# Patient Record
Sex: MALE | Race: WHITE | NOT HISPANIC OR LATINO | Employment: OTHER | URBAN - METROPOLITAN AREA
[De-identification: names, ages, dates, MRNs, and addresses within clinical notes are randomized per-mention and may not be internally consistent; named-entity substitution may affect disease eponyms.]

---

## 2020-01-15 ENCOUNTER — OFFICE VISIT (OUTPATIENT)
Dept: URGENT CARE | Facility: URGENT CARE | Age: 50
End: 2020-01-15
Payer: COMMERCIAL

## 2020-01-15 ENCOUNTER — ANCILLARY PROCEDURE (OUTPATIENT)
Dept: GENERAL RADIOLOGY | Facility: CLINIC | Age: 50
End: 2020-01-15
Attending: FAMILY MEDICINE
Payer: COMMERCIAL

## 2020-01-15 VITALS
OXYGEN SATURATION: 98 % | DIASTOLIC BLOOD PRESSURE: 73 MMHG | SYSTOLIC BLOOD PRESSURE: 120 MMHG | TEMPERATURE: 97.8 F | HEART RATE: 62 BPM | RESPIRATION RATE: 20 BRPM

## 2020-01-15 DIAGNOSIS — J20.9 ACUTE BRONCHITIS, UNSPECIFIED ORGANISM: Primary | ICD-10-CM

## 2020-01-15 DIAGNOSIS — R05.9 COUGH: ICD-10-CM

## 2020-01-15 PROCEDURE — 99203 OFFICE O/P NEW LOW 30 MIN: CPT | Performed by: FAMILY MEDICINE

## 2020-01-15 PROCEDURE — 71046 X-RAY EXAM CHEST 2 VIEWS: CPT

## 2020-01-15 RX ORDER — ALBUTEROL SULFATE 90 UG/1
2 AEROSOL, METERED RESPIRATORY (INHALATION) EVERY 6 HOURS
Qty: 1 INHALER | Refills: 0 | Status: SHIPPED | OUTPATIENT
Start: 2020-01-15 | End: 2021-04-16

## 2020-01-15 NOTE — PROGRESS NOTES
SUBJECTIVE:   Manfred Young is a 49 year old male presenting with a chief complaint of possible bronchitis.  Had fever, chest heavy.  Fever finally subsided this morning.  Mild phlegm.  Denies any sinus congestion.  Feeling more SOB  Onset of symptoms was 5 day(s) ago.  Course of illness is slight improvement.    Severity moderate  Current and Associated symptoms: cough, SOB, fever  Treatment measures tried include Fluids, Rest and Advil PM.  Predisposing factors include None.    Did not obtain flu vaccination  No TOB use.  No history of asthma, tried inhaler - may be 2 years old and did not feel any difference    Past Medical History:   Diagnosis Date     NO ACTIVE PROBLEMS      Current Outpatient Medications   Medication Sig Dispense Refill     ibuprofen (IBUPROFEN) 200 MG tablet Take 200 mg by mouth every 4 hours as needed for mild pain       ALEVE 220 MG OR CAPS 1 CAPSULE EVERY 12 HOURS AS NEEDED       guaiFENesin-codeine (ROBITUSSIN AC) 100-10 MG/5ML SOLN Take 5-10 mLs by mouth every 6 hours as needed for cough (Patient not taking: Reported on 1/15/2020) 240 mL 0     Social History     Tobacco Use     Smoking status: Never Smoker   Substance Use Topics     Alcohol use: Yes       ROS:  Review of systems negative except as stated above.    OBJECTIVE:  /73   Pulse 62   Temp 97.8  F (36.6  C) (Tympanic)   Resp 20   SpO2 98%   GENERAL APPEARANCE: healthy, alert and no distress  EYES: EOMI,  PERRL, conjunctiva clear  HENT: ear canals and TM's normal.  Nose and mouth without ulcers, erythema or lesions  NECK: supple, nontender, no lymphadenopathy  RESP: lungs clear to auscultation - no rales, rhonchi or wheezes  CV: regular rates and rhythm, normal S1 S2, no murmur noted  PSYCH: mentation appears normal and affect normal/bright    CXR - no acute infiltrate, no pleural effusion, no pneumothorax personally viewed by me    ASSESSMENT/PLAN:  (J20.9) Acute bronchitis, unspecified organism  (primary encounter  diagnosis)  Plan: albuterol (PROAIR HFA/PROVENTIL HFA/VENTOLIN         HFA) 108 (90 Base) MCG/ACT inhaler            (R05) Cough  Comment: viral  Plan: XR Chest 2 Views, albuterol (PROAIR         HFA/PROVENTIL HFA/VENTOLIN HFA) 108 (90 Base)         MCG/ACT inhaler            Reassurance given, reviewed symptomatic treatment with tylenol, ibuprofen, plenty of fluids and rest.  Discussed possible influenza but is too many days out for effective Tamiflu treatment, reviewed bronchitis symptoms most likely viral etiology.  RX albuterol inhaler given to use, reviewed that  inhaler may not be as effective.    Follow up with primary provider if no resolution of symptoms in 1 week    Kuldeep Hansen MD, MD  January 15, 2020 9:51 AM

## 2020-12-31 ENCOUNTER — TELEPHONE (OUTPATIENT)
Dept: TRANSPLANT | Facility: CLINIC | Age: 50
End: 2020-12-31

## 2020-12-31 NOTE — TELEPHONE ENCOUNTER
"Close Window   Print This Page   Expand All  Collapse All  [facility] B[facility]  [id number]      LIVING KIDNEY DONOR EVALUATION  Donor First Name Manfred Donor MRN    Donor Last Name Hector Completed 2020 12:15 AM    1970 Record ID [id number]   BREEZE Screen PASSED     Intended Recipient  Recipient First Name ALTRUISTIC Recipient MRN    Recipient Last Name ALTRUISTIC Relationship N/A   Recipient   Recipient Diagnosis    Recipient's ABO      Donor Information  Age 50 Gender Male   Ht 185 cm (6' 1'') Race    Wt 88.4 kg (195 lbs) Ethnicity Not /   BMI 25.70 kg/m  Preferred Language English      Required No     Blood Type A   Demographics  Home Address [address] [place] [address] # [phone]   [facility] Type [place]   State [facility] #    [facility] Code [id number] Type    Country [place] Preferred Contact day Wed   Email [email or web address] Preferred Contact time 11:00 AM-1:00 PM   &&   Donor's Medical Information  Medical History Kidney Stones Medications None Reported   Surgical History Arthroscopy, Left Knee   Arthroscopy, Right Knee   Repair, Detached Retina Allergies Chlorhexidine : Rash   Penicillin : Rash   Social History EtOH: Rare (1-2 drinks/month)   Illicit Drug Use: Denies   Tobacco: Denies Self-Reported Functional Status \"I am able to participate in strenuous sports such as swimming, singles tennis, football, basketball, or skiing\"   Family Medical History Cancer (denies)   Diabetes (denies)   Heart Disease (Grandparent)   Hypertension (denies)   Kidney Disease (denies)   Kidney Stones (Grandparent, Aunt or Uncle, Mother, Cousin) Exercise Frequency Exercise (3 X per week)   Review of Organ Systems  Review of Systems Airway or Lungs: No   Blood Disorder: No   Cancer: No   Diabetes,Thyroid,Adrenal,Endocrine Disorder: No   Digestive or Liver: No   Heart or Circulatory System: No   Immune Diseases: No   Kidneys and Bladder: Yes   Male Health: No "   Muscles,Bones,Joints: No   Neuro: No   Psych: No   &&   Donor's Social Information  Marital Status  Living Accommodation Owns own home/apartment   Level of Education Some college education Living Arrangement With relatives other than spouse, parents   Employment Status Self Employed Concerns: health and life insurance No   Employer  Concerns: job security and lost income Yes   Occupation      Medical Insurance Status Has medical insurance     High Risk Behavior  High Risk Behaviors Blood transfusion < 12 months. (NO)   Commercial sex < 12 months. (NO)   Illicit IV drug use < 5yrs. (NO)   Male:male sexual contact < 5yrs. (NO)   Other high risk sexual contact < 12 months. (NO)   Reason for Donation  Referral From Other Donor Reason for Donation My mentor has kidney disease and has had two transplants. He's no longer eligible and is on dialysis. I can't help him, but I'd like to help someone. I also heard a recent donor's story on NPR's It's Been a Minute last weekend. It was inspiring.   Permission to Disclose Inquiry Yes Patient Comments    Donor Motivation Level Unsure, has questions     PCP Contact  PCP Name    PCP [facility]    PCP [facility]    PCP Phone    Emergency Contact  First Name Valerie First Name tom   Last Name Wan Last Name Hector   Phone # [phone] Phone # [phone]   Phone Type Mobile Phone Type Mobile   Relationship Spouse Relationship Daughter   Office Use  Reviewed By    Reviewed 12/31/2020 12:25 PM   Admin Folder Archive   Comments    Lost for Followup    Extended Comments    [facility] ID [email or web address]:[id number] survey status completed   Activity History  Call  Due Date 12/30/2020   Last Modified Date/Time 12/30/2020 1:50 PM   Comments    Call  Due Date 12/30/2020   Last Modified Date/Time 12/30/2020 11:44 AM   Comments

## 2020-12-31 NOTE — TELEPHONE ENCOUNTER
"[facility]  [id number]      LIVING KIDNEY DONOR EVALUATION  Donor First Name Manfred Donor MRN    Donor Last Name Hector Completed 2020 12:15 AM    1970 Record ID [id number]   BREEZE Screen PASSED     Intended Recipient  Recipient First Name ALTRUISTIC Recipient MRN    Recipient Last Name ALTRUISTIC Relationship N/A   Recipient   Recipient Diagnosis    Recipient's ABO      Donor Information  Age 50 Gender Male   Ht 185 cm (6' 1'') Race    Wt 88.4 kg (195 lbs) Ethnicity Not /   BMI 25.70 kg/m  Preferred Language English      Required No     Blood Type A   Demographics  Home Address [address] [place] [address] # [phone]   [facility] Type [facility] #    [facility] Code [id number] Type    Country [place] Preferred Contact day Wed   Email [email or web address] Preferred Contact time 11:00 AM-1:00 PM   &&   Donor's Medical Information  Medical History Kidney Stones Medications None Reported   Surgical History Arthroscopy, Left Knee   Arthroscopy, Right Knee   Repair, Detached Retina Allergies Chlorhexidine : Rash   Penicillin : Rash   Social History EtOH: Rare (1-2 drinks/month)   Illicit Drug Use: Denies   Tobacco: Denies Self-Reported Functional Status \"I am able to participate in strenuous sports such as swimming, singles tennis, football, basketball, or skiing\"   Family Medical History Cancer (denies)   Diabetes (denies)   Heart Disease (Grandparent)   Hypertension (denies)   Kidney Disease (denies)   Kidney Stones (Grandparent, Aunt or Uncle, Mother, Cousin) Exercise Frequency Exercise (3 X per week)   Review of Organ Systems  Review of Systems Airway or Lungs: No   Blood Disorder: No   Cancer: No   Diabetes,Thyroid,Adrenal,Endocrine Disorder: No   Digestive or Liver: No   Heart or Circulatory System: No   Immune Diseases: No   Kidneys and Bladder: Yes   Male Health: No   Muscles,Bones,Joints: No   Neuro: No   Psych: No   &&   Donor's " Social Information  Marital Status  Living Accommodation Owns own home/apartment   Level of Education Some college education Living Arrangement With relatives other than spouse, parents   Employment Status Self Employed Concerns: health and life insurance No   Employer  Concerns: job security and lost income Yes   Occupation      Medical Insurance Status Has medical insurance     High Risk Behavior  High Risk Behaviors Blood transfusion < 12 months. (NO)   Commercial sex < 12 months. (NO)   Illicit IV drug use < 5yrs. (NO)   Male:male sexual contact < 5yrs. (NO)   Other high risk sexual contact < 12 months. (NO)   Reason for Donation  Referral From Other Donor Reason for Donation My mentor has kidney disease and has had two transplants. He's no longer eligible and is on dialysis. I can't help him, but I'd like to help someone. I also heard a recent donor's story on NPR's It's Been a Minute last weekend. It was inspiring.   Permission to Disclose Inquiry Yes Patient Comments    Donor Motivation Level Unsure, has questions     PCP Contact  PCP Name    PCP [facility]    PCP State    PCP Phone    Emergency Contact  First Name Valerie First Name tom   Last Name Wan Last Name Hector   Phone # [phone] Phone # [phone]   Phone Type Mobile Phone Type Mobile   Relationship Spouse Relationship Daughter   Office Use  Reviewed By    Reviewed 12/31/2020 12:25 PM   Admin Folder Archive   Comments    Lost for Followup    Extended Comments    [facility] ID [id number] survey status completed   Activity History  Call  Due Date 12/30/2020   Last Modified Date/Time 12/30/2020 1:50 PM   Comments    Call  Due Date 12/30/2020   Last Modified Date/Time 12/30/2020 11:44 AM   Comments

## 2021-01-04 DIAGNOSIS — Z00.5 TRANSPLANT DONOR EVALUATION: Primary | ICD-10-CM

## 2021-01-04 NOTE — TELEPHONE ENCOUNTER
NDD. Is abo A. Had kidney stone x1 approx 10 yrs ago.Passed stone.Will send info/forms.To [facility] for labs.

## 2021-02-02 ENCOUNTER — ALLIED HEALTH/NURSE VISIT (OUTPATIENT)
Dept: TRANSPLANT | Facility: CLINIC | Age: 51
End: 2021-02-02
Attending: INTERNAL MEDICINE

## 2021-02-02 VITALS
HEART RATE: 61 BPM | TEMPERATURE: 98.2 F | OXYGEN SATURATION: 97 % | DIASTOLIC BLOOD PRESSURE: 83 MMHG | SYSTOLIC BLOOD PRESSURE: 116 MMHG | BODY MASS INDEX: 26.97 KG/M2 | WEIGHT: 204.4 LBS

## 2021-02-02 DIAGNOSIS — Z00.5 TRANSPLANT DONOR EVALUATION: Primary | ICD-10-CM

## 2021-02-02 DIAGNOSIS — Z00.5 TRANSPLANT DONOR EVALUATION: ICD-10-CM

## 2021-02-02 LAB
ABO + RH BLD: NORMAL
ABO + RH BLD: NORMAL
ALBUMIN UR-MCNC: NEGATIVE MG/DL
APPEARANCE UR: CLEAR
BILIRUB UR QL STRIP: NEGATIVE
COLOR UR AUTO: YELLOW
CREAT SERPL-MCNC: 0.97 MG/DL (ref 0.66–1.25)
CREAT UR-MCNC: 194 MG/DL
GFR SERPL CREATININE-BSD FRML MDRD: >90 ML/MIN/{1.73_M2}
GLUCOSE SERPL-MCNC: 96 MG/DL (ref 70–99)
GLUCOSE UR STRIP-MCNC: NEGATIVE MG/DL
HGB BLD-MCNC: 15.6 G/DL (ref 13.3–17.7)
HGB UR QL STRIP: NEGATIVE
KETONES UR STRIP-MCNC: NEGATIVE MG/DL
LEUKOCYTE ESTERASE UR QL STRIP: NEGATIVE
MICROALBUMIN UR-MCNC: 15 MG/L
MICROALBUMIN/CREAT UR: 7.94 MG/G CR (ref 0–17)
MUCOUS THREADS #/AREA URNS LPF: PRESENT /LPF
NITRATE UR QL: NEGATIVE
PH UR STRIP: 5 PH (ref 5–7)
PROT UR-MCNC: 0.26 G/L
PROT/CREAT 24H UR: 0.13 G/G CR (ref 0–0.2)
RBC #/AREA URNS AUTO: 1 /HPF (ref 0–2)
SOURCE: ABNORMAL
SP GR UR STRIP: 1.02 (ref 1–1.03)
SPECIMEN EXP DATE BLD: NORMAL
UROBILINOGEN UR STRIP-MCNC: 0 MG/DL (ref 0–2)
WBC #/AREA URNS AUTO: 1 /HPF (ref 0–5)

## 2021-02-02 PROCEDURE — 82043 UR ALBUMIN QUANTITATIVE: CPT | Performed by: PATHOLOGY

## 2021-02-02 PROCEDURE — 81001 URINALYSIS AUTO W/SCOPE: CPT | Performed by: PATHOLOGY

## 2021-02-02 PROCEDURE — 99207 PR NO CHARGE NURSE ONLY: CPT

## 2021-02-02 PROCEDURE — 86900 BLOOD TYPING SEROLOGIC ABO: CPT | Performed by: PATHOLOGY

## 2021-02-02 PROCEDURE — 82947 ASSAY GLUCOSE BLOOD QUANT: CPT | Performed by: PATHOLOGY

## 2021-02-02 PROCEDURE — 36415 COLL VENOUS BLD VENIPUNCTURE: CPT | Performed by: PATHOLOGY

## 2021-02-02 PROCEDURE — 84156 ASSAY OF PROTEIN URINE: CPT | Performed by: PATHOLOGY

## 2021-02-02 PROCEDURE — 82565 ASSAY OF CREATININE: CPT | Performed by: PATHOLOGY

## 2021-02-02 PROCEDURE — 85018 HEMOGLOBIN: CPT | Performed by: PATHOLOGY

## 2021-02-02 ASSESSMENT — PAIN SCALES - GENERAL: PAINLEVEL: NO PAIN (0)

## 2021-02-02 NOTE — PROGRESS NOTES
"Chief Complaint   Patient presents with     Allied Health Visit     living donor 3 bp     Vital signs:  Temp: 98.2  F (36.8  C)   BP: 117/76 Pulse: 61     SpO2: 97 %       Weight: 92.7 kg (204 lb 6.4 oz)  Estimated body mass index is 26.97 kg/m  as calculated from the following:    Height as of 3/1/16: 1.854 m (6' 1\").    Weight as of this encounter: 92.7 kg (204 lb 6.4 oz).        Amna Newby, CMA    "

## 2021-03-01 ENCOUNTER — TELEPHONE (OUTPATIENT)
Dept: TRANSPLANT | Facility: CLINIC | Age: 51
End: 2021-03-01

## 2021-03-01 NOTE — LETTER
REIMBURSEMENT INFORMATION FOR LIVING ORGAN DONORS    LIVING ORGAN DONOR: This form MUST accompany & remain attached to Orders &  given to Provider and/or Healthcare Facility Business Office    PROVIDER/FACILITY INSTRUCTIONS: By accepting to perform these services for living organ  donation, the provider/facility agrees to exclusively bill the [facility] instead of billing  the patient or any insurance provider and agrees to accept the reimbursement, as described below, as  payment in full for services rendered.    PROVIDER BILLING INSTRUCTIONS:  1. [facility] agrees to pay for all authorized testing ordered by our transplant  program that is related to living organ donation. The attached orders/tests are part of the donor  Evaluation.    2. Do not bill the donor or donor's insurance. Send an itemized invoice, claim or statement to:    [facility]  Transplant Finance/Donor Billing  [address] [place]  [place] [address]    3. Billing statements must include the patient first and last name, date of birth, the CPT procedure code  and date of service. Please bill service on the ORIGINAL UBO4 or 1500 with appropriate CPT/HCPCS  codes along with W-9 and send to the above address to insure timely reimbursement.    4. Claims should be submitted no later than six months from the date when services are rendered.  Claims denied for late submission should not be billed to the donor or their private insurance carrier.    5. [facility] will reimburse all charges at 100% of the Medicare Fee Schedule as  defined in the Code of Federal Regulations (CFR) 42, Chapter IV. This is to be considered payment  in full. [facility], the patient, and/or the patient's insurance are NOT to  be billed any balance, co-payment, or deductible, per Medicare regulations. **ATTN: Facility  providing services for attached/enclosed Living Donor Orders; If facility does  NOT AGREE to  the reimbursement rate stated above, PLEASE DENY SERVICES & refer Donor/patient back to  their [facility].    6. Patients are NOT to make any payments at the time of service.    Please forward this information to your billing department so that a donor account can be set up with  these instructions.    Should you have any questions, please contact the Donor Billing office at [phone],  Monday - Friday, 8:00 a.m. to 4:00 p.m.   Thank you for your assistance.

## 2021-03-09 ENCOUNTER — TRANSFERRED RECORDS (OUTPATIENT)
Dept: HEALTH INFORMATION MANAGEMENT | Facility: CLINIC | Age: 51
End: 2021-03-09

## 2021-03-24 ENCOUNTER — TELEPHONE (OUTPATIENT)
Dept: TRANSPLANT | Facility: CLINIC | Age: 51
End: 2021-03-24

## 2021-03-24 DIAGNOSIS — Z00.5 TRANSPLANT DONOR EVALUATION: ICD-10-CM

## 2021-03-24 NOTE — TELEPHONE ENCOUNTER
Reviewed [employer] test results at Donor Meeting.Per Dr Quezada he needs to increase fluids to at least 3L's/day and be able to urinate 2L's/day.Stated we can cont process of eval.Now reviewed everything with Manfred.States he's a Vegan.Would like to sched eval.

## 2021-03-24 NOTE — TELEPHONE ENCOUNTER
Please sched kidney donor eval for 4/16/21 slot 3.[facility] is coordinator.Will do Iohexol on 4/16.Schedule COVID test/eval labs for 4/12.Has MyChart. I will put in orders.

## 2021-03-27 ENCOUNTER — HEALTH MAINTENANCE LETTER (OUTPATIENT)
Age: 51
End: 2021-03-27

## 2021-04-12 DIAGNOSIS — Z00.5 TRANSPLANT DONOR EVALUATION: ICD-10-CM

## 2021-04-12 LAB
ABO + RH BLD: NORMAL
ALBUMIN SERPL-MCNC: 3.6 G/DL (ref 3.4–5)
ALBUMIN UR-MCNC: NEGATIVE MG/DL
ALP SERPL-CCNC: 65 U/L (ref 40–150)
ALT SERPL W P-5'-P-CCNC: 85 U/L (ref 0–70)
ANION GAP SERPL CALCULATED.3IONS-SCNC: 3 MMOL/L (ref 3–14)
APPEARANCE UR: CLEAR
APTT PPP: 27 SEC (ref 22–37)
AST SERPL W P-5'-P-CCNC: 30 U/L (ref 0–45)
BILIRUB DIRECT SERPL-MCNC: 0.2 MG/DL (ref 0–0.2)
BILIRUB SERPL-MCNC: 0.8 MG/DL (ref 0.2–1.3)
BILIRUB UR QL STRIP: NEGATIVE
BLD GP AB SCN SERPL QL: NORMAL
BLOOD BANK CMNT PATIENT-IMP: NORMAL
BUN SERPL-MCNC: 12 MG/DL (ref 7–30)
CALCIUM SERPL-MCNC: 8.6 MG/DL (ref 8.5–10.1)
CHLORIDE SERPL-SCNC: 114 MMOL/L (ref 94–109)
CHOLEST SERPL-MCNC: 182 MG/DL
CMV IGG SERPL QL IA: <0.2 AI (ref 0–0.8)
CO2 SERPL-SCNC: 28 MMOL/L (ref 20–32)
COLOR UR AUTO: YELLOW
CREAT SERPL-MCNC: 0.96 MG/DL (ref 0.66–1.25)
CREAT UR-MCNC: 132 MG/DL
EBV VCA IGG SER QL IA: 6.4 AI (ref 0–0.8)
EBV VCA IGM SER QL IA: 0.2 AI (ref 0–0.8)
ERYTHROCYTE [DISTWIDTH] IN BLOOD BY AUTOMATED COUNT: 13.8 % (ref 10–15)
GFR SERPL CREATININE-BSD FRML MDRD: >90 ML/MIN/{1.73_M2}
GLUCOSE SERPL-MCNC: 98 MG/DL (ref 70–99)
GLUCOSE UR STRIP-MCNC: NEGATIVE MG/DL
HBA1C MFR BLD: 5.2 % (ref 0–5.6)
HBV CORE AB SERPL QL IA: NONREACTIVE
HBV SURFACE AB SERPL IA-ACNC: 1.31 M[IU]/ML
HBV SURFACE AG SERPL QL IA: NONREACTIVE
HCT VFR BLD AUTO: 47.2 % (ref 40–53)
HCV AB SERPL QL IA: NONREACTIVE
HDLC SERPL-MCNC: 45 MG/DL
HGB BLD-MCNC: 15.2 G/DL (ref 13.3–17.7)
HGB UR QL STRIP: NEGATIVE
HIV 1+2 AB+HIV1 P24 AG SERPL QL IA: NONREACTIVE
INR PPP: 1.04 (ref 0.86–1.14)
KETONES UR STRIP-MCNC: NEGATIVE MG/DL
LABORATORY COMMENT REPORT: NORMAL
LDLC SERPL CALC-MCNC: 105 MG/DL
LEUKOCYTE ESTERASE UR QL STRIP: NEGATIVE
MCH RBC QN AUTO: 28 PG (ref 26.5–33)
MCHC RBC AUTO-ENTMCNC: 32.2 G/DL (ref 31.5–36.5)
MCV RBC AUTO: 87 FL (ref 78–100)
MICROALBUMIN UR-MCNC: 9 MG/L
MICROALBUMIN/CREAT UR: 6.73 MG/G CR (ref 0–17)
MUCOUS THREADS #/AREA URNS LPF: PRESENT /LPF
NITRATE UR QL: NEGATIVE
NONHDLC SERPL-MCNC: 137 MG/DL
PH UR STRIP: 6 PH (ref 5–7)
PHOSPHATE SERPL-MCNC: 3 MG/DL (ref 2.5–4.5)
PLATELET # BLD AUTO: 202 10E9/L (ref 150–450)
POTASSIUM SERPL-SCNC: 4.3 MMOL/L (ref 3.4–5.3)
PROT SERPL-MCNC: 6.6 G/DL (ref 6.8–8.8)
PROT UR-MCNC: 0.15 G/L
PROT/CREAT 24H UR: 0.11 G/G CR (ref 0–0.2)
RBC # BLD AUTO: 5.42 10E12/L (ref 4.4–5.9)
RBC #/AREA URNS AUTO: <1 /HPF (ref 0–2)
SARS-COV-2 RNA RESP QL NAA+PROBE: NEGATIVE
SARS-COV-2 RNA RESP QL NAA+PROBE: NORMAL
SODIUM SERPL-SCNC: 145 MMOL/L (ref 133–144)
SOURCE: ABNORMAL
SP GR UR STRIP: 1.02 (ref 1–1.03)
SPECIMEN EXP DATE BLD: NORMAL
SPECIMEN EXP DATE BLD: NORMAL
SPECIMEN SOURCE: NORMAL
SPECIMEN SOURCE: NORMAL
T PALLIDUM AB SER QL: NONREACTIVE
TRIGL SERPL-MCNC: 162 MG/DL
URATE SERPL-MCNC: 5.5 MG/DL (ref 3.5–7.2)
UROBILINOGEN UR STRIP-MCNC: 0 MG/DL (ref 0–2)
WBC # BLD AUTO: 4.5 10E9/L (ref 4–11)
WBC #/AREA URNS AUTO: 1 /HPF (ref 0–5)

## 2021-04-12 PROCEDURE — U0005 INFEC AGEN DETEC AMPLI PROBE: HCPCS | Mod: 90 | Performed by: PATHOLOGY

## 2021-04-12 PROCEDURE — 86905 BLOOD TYPING RBC ANTIGENS: CPT | Mod: 90 | Performed by: PATHOLOGY

## 2021-04-12 PROCEDURE — 84550 ASSAY OF BLOOD/URIC ACID: CPT | Performed by: PATHOLOGY

## 2021-04-12 PROCEDURE — 85027 COMPLETE CBC AUTOMATED: CPT | Performed by: PATHOLOGY

## 2021-04-12 PROCEDURE — 86644 CMV ANTIBODY: CPT | Mod: 90 | Performed by: PATHOLOGY

## 2021-04-12 PROCEDURE — 82248 BILIRUBIN DIRECT: CPT | Performed by: PATHOLOGY

## 2021-04-12 PROCEDURE — 83036 HEMOGLOBIN GLYCOSYLATED A1C: CPT | Performed by: PATHOLOGY

## 2021-04-12 PROCEDURE — 36415 COLL VENOUS BLD VENIPUNCTURE: CPT | Performed by: PATHOLOGY

## 2021-04-12 PROCEDURE — 86780 TREPONEMA PALLIDUM: CPT | Mod: 90 | Performed by: PATHOLOGY

## 2021-04-12 PROCEDURE — 86803 HEPATITIS C AB TEST: CPT | Mod: 90 | Performed by: PATHOLOGY

## 2021-04-12 PROCEDURE — 86704 HEP B CORE ANTIBODY TOTAL: CPT | Mod: 90 | Performed by: PATHOLOGY

## 2021-04-12 PROCEDURE — 86901 BLOOD TYPING SEROLOGIC RH(D): CPT | Performed by: PATHOLOGY

## 2021-04-12 PROCEDURE — 81001 URINALYSIS AUTO W/SCOPE: CPT | Performed by: PATHOLOGY

## 2021-04-12 PROCEDURE — 86790 VIRUS ANTIBODY NOS: CPT | Mod: 90 | Performed by: PATHOLOGY

## 2021-04-12 PROCEDURE — 80061 LIPID PANEL: CPT | Performed by: PATHOLOGY

## 2021-04-12 PROCEDURE — 84156 ASSAY OF PROTEIN URINE: CPT | Performed by: PATHOLOGY

## 2021-04-12 PROCEDURE — 86481 TB AG RESPONSE T-CELL SUSP: CPT | Mod: 90 | Performed by: PATHOLOGY

## 2021-04-12 PROCEDURE — 87340 HEPATITIS B SURFACE AG IA: CPT | Mod: 90 | Performed by: PATHOLOGY

## 2021-04-12 PROCEDURE — U0003 INFECTIOUS AGENT DETECTION BY NUCLEIC ACID (DNA OR RNA); SEVERE ACUTE RESPIRATORY SYNDROME CORONAVIRUS 2 (SARS-COV-2) (CORONAVIRUS DISEASE [COVID-19]), AMPLIFIED PROBE TECHNIQUE, MAKING USE OF HIGH THROUGHPUT TECHNOLOGIES AS DESCRIBED BY CMS-2020-01-R: HCPCS | Mod: 90 | Performed by: PATHOLOGY

## 2021-04-12 PROCEDURE — 86706 HEP B SURFACE ANTIBODY: CPT | Mod: 90 | Performed by: PATHOLOGY

## 2021-04-12 PROCEDURE — 86850 RBC ANTIBODY SCREEN: CPT | Performed by: PATHOLOGY

## 2021-04-12 PROCEDURE — 86900 BLOOD TYPING SEROLOGIC ABO: CPT | Performed by: PATHOLOGY

## 2021-04-12 PROCEDURE — 85730 THROMBOPLASTIN TIME PARTIAL: CPT | Performed by: PATHOLOGY

## 2021-04-12 PROCEDURE — 82043 UR ALBUMIN QUANTITATIVE: CPT | Performed by: PATHOLOGY

## 2021-04-12 PROCEDURE — 84100 ASSAY OF PHOSPHORUS: CPT | Performed by: PATHOLOGY

## 2021-04-12 PROCEDURE — 85610 PROTHROMBIN TIME: CPT | Performed by: PATHOLOGY

## 2021-04-12 PROCEDURE — 86665 EPSTEIN-BARR CAPSID VCA: CPT | Mod: 90 | Performed by: PATHOLOGY

## 2021-04-12 PROCEDURE — 80053 COMPREHEN METABOLIC PANEL: CPT | Performed by: PATHOLOGY

## 2021-04-13 LAB
GAMMA INTERFERON BACKGROUND BLD IA-ACNC: 0 IU/ML
M TB IFN-G CD4+ BCKGRND COR BLD-ACNC: 10 IU/ML
M TB TUBERC IFN-G BLD QL: NEGATIVE
MITOGEN IGNF BCKGRD COR BLD-ACNC: 0 IU/ML
MITOGEN IGNF BCKGRD COR BLD-ACNC: 0 IU/ML

## 2021-04-14 ENCOUNTER — TELEPHONE (OUTPATIENT)
Dept: TRANSPLANT | Facility: CLINIC | Age: 51
End: 2021-04-14

## 2021-04-14 ENCOUNTER — DOCUMENTATION ONLY (OUTPATIENT)
Dept: TRANSPLANT | Facility: CLINIC | Age: 51
End: 2021-04-14

## 2021-04-14 LAB — HTLV I+II AB PATRN SER IB-IMP: NEGATIVE

## 2021-04-16 ENCOUNTER — OFFICE VISIT (OUTPATIENT)
Dept: TRANSPLANT | Facility: CLINIC | Age: 51
End: 2021-04-16
Attending: TRANSPLANT SURGERY

## 2021-04-16 ENCOUNTER — ANCILLARY PROCEDURE (OUTPATIENT)
Dept: CT IMAGING | Facility: CLINIC | Age: 51
End: 2021-04-16
Attending: INTERNAL MEDICINE

## 2021-04-16 ENCOUNTER — APPOINTMENT (OUTPATIENT)
Dept: TRANSPLANT | Facility: CLINIC | Age: 51
End: 2021-04-16
Attending: INTERNAL MEDICINE

## 2021-04-16 ENCOUNTER — OFFICE VISIT (OUTPATIENT)
Dept: NEPHROLOGY | Facility: CLINIC | Age: 51
End: 2021-04-16
Attending: TRANSPLANT SURGERY

## 2021-04-16 ENCOUNTER — INFUSION THERAPY VISIT (OUTPATIENT)
Dept: INFUSION THERAPY | Facility: CLINIC | Age: 51
End: 2021-04-16
Attending: INTERNAL MEDICINE

## 2021-04-16 ENCOUNTER — ALLIED HEALTH/NURSE VISIT (OUTPATIENT)
Dept: TRANSPLANT | Facility: CLINIC | Age: 51
End: 2021-04-16
Attending: TRANSPLANT SURGERY

## 2021-04-16 ENCOUNTER — ALLIED HEALTH/NURSE VISIT (OUTPATIENT)
Dept: TRANSPLANT | Facility: CLINIC | Age: 51
End: 2021-04-16
Attending: TRANSPLANT SURGERY
Payer: COMMERCIAL

## 2021-04-16 ENCOUNTER — ANCILLARY PROCEDURE (OUTPATIENT)
Dept: GENERAL RADIOLOGY | Facility: CLINIC | Age: 51
End: 2021-04-16
Attending: INTERNAL MEDICINE

## 2021-04-16 VITALS
TEMPERATURE: 98.4 F | WEIGHT: 206.4 LBS | OXYGEN SATURATION: 98 % | DIASTOLIC BLOOD PRESSURE: 77 MMHG | HEIGHT: 73 IN | RESPIRATION RATE: 16 BRPM | BODY MASS INDEX: 27.35 KG/M2 | HEART RATE: 55 BPM | SYSTOLIC BLOOD PRESSURE: 120 MMHG

## 2021-04-16 DIAGNOSIS — Z00.5 TRANSPLANT DONOR EVALUATION: ICD-10-CM

## 2021-04-16 DIAGNOSIS — Z00.5 TRANSPLANT DONOR EVALUATION: Primary | ICD-10-CM

## 2021-04-16 DIAGNOSIS — Z87.442 HISTORY OF NEPHROLITHIASIS: ICD-10-CM

## 2021-04-16 DIAGNOSIS — E78.5 DYSLIPIDEMIA: ICD-10-CM

## 2021-04-16 DIAGNOSIS — R79.89 ELEVATED LFTS: ICD-10-CM

## 2021-04-16 PROCEDURE — 250N000011 HC RX IP 250 OP 636: Performed by: INTERNAL MEDICINE

## 2021-04-16 PROCEDURE — 82542 COL CHROMOTOGRAPHY QUAL/QUAN: CPT | Performed by: INTERNAL MEDICINE

## 2021-04-16 PROCEDURE — 99245 OFF/OP CONSLTJ NEW/EST HI 55: CPT

## 2021-04-16 PROCEDURE — 71046 X-RAY EXAM CHEST 2 VIEWS: CPT | Mod: GC | Performed by: RADIOLOGY

## 2021-04-16 PROCEDURE — 99204 OFFICE O/P NEW MOD 45 MIN: CPT | Performed by: TRANSPLANT SURGERY

## 2021-04-16 PROCEDURE — 74175 CTA ABDOMEN W/CONTRAST: CPT | Mod: GC | Performed by: RADIOLOGY

## 2021-04-16 RX ORDER — IOPAMIDOL 755 MG/ML
100 INJECTION, SOLUTION INTRAVASCULAR ONCE
Status: COMPLETED | OUTPATIENT
Start: 2021-04-16 | End: 2021-04-16

## 2021-04-16 RX ADMIN — IOHEXOL 5 ML: 300 INJECTION, SOLUTION INTRAVENOUS at 08:26

## 2021-04-16 RX ADMIN — IOPAMIDOL 100 ML: 755 INJECTION, SOLUTION INTRAVASCULAR at 13:15

## 2021-04-16 ASSESSMENT — MIFFLIN-ST. JEOR: SCORE: 1860.1

## 2021-04-16 NOTE — PROGRESS NOTES
"Iohexol Timed Test Nursing Note    Manfred Young comes to [facility] today for a Iohexol GFR Timed test.   Orders from Basil Smalls MD  were completed.    Progress Note      The following information was verified with the patient:  *Female patients are not pregnant or could not have become recently pregnant: YES, No, Not applicable  *Is there a history of allergy (skin rash, swelling, ect) to :   a. Iodine (except skin reactions to Betadine): NO   b. Intravenous radio-contrast agents: NO   c. Seafood: NO     RN provided patient with educational handout regarding timed test. YES    Medication administered :   Iohexol (Omnipaque 300 mg Iodine/ ml concentration) 5 mls.  Site administered Right AC  Start [id number]  Stop [id number]    Left AC PIV left in place for Blood draws/ CT.      Drug Waste Record    Drug Name: Iohexol  Dose: 5 ml  Route administered: IV  NDC #: [id number]  Amount of waste(mL):5  Reason for waste: Single use vial        Patient tolerated the procedure well    Vitals were reviewed   /77   Pulse 55   Temp 98.4  F (36.9  C) (Oral)   Resp 16   Ht 1.854 m (6' 1\")   Wt 93.6 kg (206 lb 6.4 oz)   SpO2 98%   BMI 27.23 kg/m      Discharge Plan    Discharge instructions reviewed with patient: YES  Discharge papers printed and given to patient: YES  Patient/Representative verbalized understanding, all questions answered: YES        Cherie Chamberlain RN      "

## 2021-04-16 NOTE — NURSING NOTE
I met with Manfred in donor evaluation clinic today.  I provided copies of the Living Kidney Donor Consent,  The Paired Exchange/NDD consent.  I provided pamphlets on Donor Shield and LDC.  I answered all questions. Alt was repeated.

## 2021-04-16 NOTE — PROGRESS NOTES
Psychosocial Evaluation  Living Organ Donation per OPTN Policy 14.1.A  Organ Type: Non directed kidney donor  Presenting Information:  Manfred Young presents to the [facility] to complete a psychosocial evaluation since he is interested in becoming a Non directed kidney donor.  Mr. Young is a 50 year old  male. He was neatly dressed and groomed.  Mood was euthymic.  Thought process logical and goal directed.  He came to the transplant center alone today.  He was pleasant and forthcoming in sharing information.  PERSONAL BACKGROUND:  Current Living Situation: Manfred, along with his 15 year old daughter, live in the [facility] in [place], [place]    Education/Employment/Financial Situation: Manfred has some college education.  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  He is self employed.    Health Insurance Status: He has medical insurance.    Family History: Manfred was born in [place], [place] and raised in [place], [place].  His parents  one year after he finished high school.  His father is healthy.  His mother has some medical issues but is doing alright.  She lives about five miles away from Manfred.  He has one sister who is four years younger.  He reports having good relationships with his family members.  Manfred is currently , after 21 years of marriage.  He has one daughter, Criselda, who is 16 years of age.  She is doing well and is currently doing distant learning due to the pandemic.    General Health: He considers himself to be generally healthy.    Mental Health: he denies any current or past mental health issues.  He and his wife did have some marriage counseling but no other therapy or psychiatry.  He denies any past suicidal ideation, no past psychiatric hospitalizations.  No use of neuroleptic medication or need to see a psychiatrist.    Alcohol and Drug Use/Abuse/Dependency: He drinks rarely.  No  street drug use.  No past chemical abuse issues.    Cigarette Use: Denies    Legal: Denies    Coping with major surgery/associated stress: He expects that he would do well with surgery and recovery.  He is a busy schedule with his job as a builder and wouldn't be able to consider donation or recovery until 2022.  He would also want to have his support in place, and currently, his ex-wife, mother and daughter are not fully on board.    Support System: Daughter and mother are his main supports.  He also has some close friends.  He used to go roller skating at the [employer] in [place] on a weekly basis, which was something that he has really enjoyed doing and has been a source of support for him.    DONOR SPECIFIC INFORMATION:  Decision Process/Motivation to Donate: He is highly motivated to be a Non Directed Donor.  He has a mentor in the furniture business who is on dialysis, after having already had two transplants, so he understands the problems kidney disease causes people.  He listen to a story on NPR about a woman's experience being a Non directed donor, and this is when it became clear to him that he really wanted to pursue donation.  He is a regular blood donor.  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  He is on various neighborhood committees and climate change groups.  His main concern is that his ex-wife and mother are not very supportive of him doing this and are worried about the risks to him.  He isn't sure how his daughter feels about it.  He agrees relay the information that he has learned to them to help educate them.  He would not be able to donate until 2022 as his work schedule is booked out for the rest of the year.  I let him know that we would be able to meet with them if he'd like to provide further education.  Ultimately, he would want them to be on board with donation and hopes that thing will go that way over time.    PREPARATION FOR DONATION, RECOVERY, AND POTENTIAL  SHORT-LONG-TERM OUTCOMES:  Understanding of the Living Donation Process:   We discussed the role of the donor  and Independent Donor Advocate.  Short and long term medical and psychosocial risks to both, donor and recipient were reviewed and he is capable of understanding the risks.  High risk behaviors as defined by [place] Public Health Services (PHS) 2013 that have potential to increase risk of disease transmission were reviewed and he denies high risk behaviors. Post surgical restrictions (2 weeks no driving, 6 weeks no lifting over 10 lbs) were reviewed and he appears capable of adhering to the post surgical requirements. The need for a caregiver was discussed and he is not yet sure how this will look and will have continued conversations with family members .  The risk of poor psychosocial outcome including problems with body image, post-surgery depression or anxiety, or feelings of emotional distress or bereavement if recipient experiences any recurrent disease, poor outcome or death was reviewed.  Additionally, potential financial implications, including the risk of having difficulty obtaining health care insurance, life insurance, disability insurance, or long term care insurance were reviewed, as were available donor grants to assist with donor related expenses.      We also discussed some unique issues that arise with paired kidney donation, which include the uncertainty of the timing and the importance of having a employment situation and support system that is able to provide sustained support and flexibility.    He appears capable of understanding this information and making an informed medical decision.    Impressions/Recommendations:   Manfred Young  is highly motivated to be a Non directed kidney donor.  His decision to donate is free of inducement, coercion, or other undue pressure.   His housing, finances and employment are stable.  No current/active mental health or chemical abuse issues  were identified.  The need for a caregiver was reviewed and he would want his family to be on board before he commits to surgery.  He will continue to have conversations with them. He actually wouldn't be able to donate for close to a year given his work commitments, so he could also bring them in to clinic at some point in the future so that we can answer any questions that they may have. He appears capable of making an informed medical decision.  If/when he has support and a caregiver in place, no other psychosocial contraindications to living organ donation were identified  and  I support his desire to be a Non directed kidney donor.         Contact Information:   MARLENY THACKER, [facility]  Clinical  and Independent Donor Advocate  [facility]  Phone - [phone]  Pager - [phone]  [email or web address]      Time Spent: 60 minutes      Living Kidney Donor Consent per OPTN Policy 14.3.A for Independent Living Donor Advocate (LATANYA)    Written assurance has been obtained from the potential donor that he/she:   Is willing to donate  Is free from inducement and coercion  Has been informed that the he/she may decline to donate at any time  Has been informed that transplant centers must:   A) Offer donors an opportunity to discontinue the donor consent or evaluation process in a way that is protected and confidential  B) Provide an independent living donor advocate (LATANYA) to assist the potential donor during this process    The following was presented to the potential donor in a language in which the potential donor is able to engage in meaningful dialogue:   Education and instruction about all phases of the living donation process including:   Consent  Medical and psychosocial evaluation  Information about the surgical procedure  Pre and post operative care  Benefits of post operative follow up  Disclosure that the recovery hospital will take all reasonable precautions to provide confidentiality for the  donor/recipient  Disclosure that it is a federal crime for any person to knowingly acquire, obtain or otherwise transfer any human organ for valuable consideration  Disclosure that the [facility] must provide an independent living donor advocate (LATANYA)  Disclosure that health information obtained during the evaluation is subject to the same regulations as all records and could reveal conditions that must be reported to local, state, or federal public health authorities  Disclosure that the [facility] is required to report living donor follow up information at 6 months, 1 year, and 2 years, and that the potential donor must commit to post operative follow up testing coordinated by the [facility]    Disclosure has been provided that these risks may be transient or permanent & include but are not limited to:  Potential psychosocial risks:  Problems with body image  Post-surgery depression or anxiety  Feelings of emotional distress or bereavement if recipient experiences any recurrent disease or in the event of the recipient s death  Impact of donation on the donor s lifestyle    Potential financial impacts:  Personal expenses of travel, housing, , lost wages related to donation might not be reimbursed. However, resources may be available to defray some donation-related expenses   Need for life-long follow up at the donor s expense  Loss of employment or income  Negative impact on the ability to obtain future employment  Negative impact on the ability to obtain, maintain, or afford health, disability, and life insurance  Future health problems experienced by living donors following donation may not be covered by the recipient s insurance    Contact Information:  MARLENY THACKER, [facility]  Clinical  and Independent Donor Advocate  [facility]  Phone - [phone]  Pager - [phone]  [email or web address]      Time Spent: 60 minutes

## 2021-04-16 NOTE — PROGRESS NOTES
[facility] Solid [facility] Transplant  Outpatient MNT: Kidney Donor Evaluation    Current BMI: 27.2 (HT 73 in,  lbs/94 kg)  BMI is within recommendation of <30 for kidney donation    8 Year Estimated Risk of T2DM  </= 3%     Time Spent: 15 minutes  Visit Type: Initial   Referring Physician: Shahnaz  Pt accompanied by: self     Nutrition Assessment  Pt has been vegan x 5 years.     Vitamins, Supplements, Pertinent Meds: B complex, vit C 500 mg daily x 1 year   Herbal Medicines/Supplements: none     Weight hx: gained 10 lbs within past year, ?stable now    Food Security: any concerns about having enough money to buy food or access to grocery stores? No     Diet Recall  Breakfast Granola; oatmeal; oat or coconut yogurt; fruit; toast    Lunch Leftovers    Dinner Homemade lasagna   Snacks Nuts, fruit   Beverages Water, kombucha    Alcohol Rare, ~1x/month    Dining out 2x/week      Physical Activity  Bike, hike, rollerskating- a few times/week   Physical job- wood worker      Labs  Recent Labs   Lab Test 04/12/21  0804   CHOL 182   HDL 45   *   TRIG 162*       FBG = 98  A1c = 5.2  BP = wnl x 3     Prediction of Incident Diabetes Mellitus in Middle-aged Adults: The [place] Offspring Study  Swapnil Braga MD; James B. Meigs, MD, MPH; Megan Zuleta, PhD; Elsie Chairez MD, MPH; Ze Edouard MD; Anam Jama Sr,   PhD  Pt's estimated risk for T2DM (per Table 6 above)  Pt received points for the following criteria: BMI>25, TG>150   Total points: 5  8-Year estimated risk of T2DM: </= 3%    Nutrition Diagnosis  No nutrition diagnosis identified at this time.    Nutrition Intervention  Nutrition education provided:  Reviewed overall healthy diet guidelines for pre and post kidney donation. Discussed maintenance of a healthy weight and Na+ intake <3000 mg/day (<2000 mg/day if HTN).    Avoid the following post op d/t unknown effects on the organs:  - Herbal, Chinese, holistic, chiropractic,  natural, alternative medicines and supplements  - Detoxes and cleanses  - Weight loss pills  - Protein powders or other products with extracts or herbs (ie green tea extract)    Patient Understanding: Pt verbalized understanding of education provided.  Expected Engagement: Good  Follow-Up Plans: PRN     Nutrition Goals  No nutrition goals identified at this time     Zoe Cerrato, RD, LD, CCTD
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
## 2021-04-16 NOTE — LETTER
4/16/2021         RE: Manfred Young  [address]  [facility] [address]        Dear Colleague,    Thank you for referring your patient, Manfred Young, to the [facility]. Please see a copy of my visit note below.    [facility]  Consult Note     Medical record number: [id number]  YOB: 1970,     Date of Visit:  4/16/22  Consult requested by the patient for evaluation of kidney donation candidacy.    Assessment and Recommendations: Mr. Young appears to be a good candidate for kidney donation at this point in the evaluation. The following issues will need to be addressed prior to formal review:  Iohexal GFR  CT angio    Risks of the surgical procedure including but not limited to the rare risk of mortality discussed in detail. Patient verbalized good understanding and had several pertinent questions which were answered.     The majority of our visit today was spent in counseling regarding the medical and surgical risks of kidney donation (including long-term risks) ; the typical marge-and post-operative experience and recovery/return to work pattern; restrictions related to the surgery (driving; lifting; exercise).      We also talked about post-op visits and longer term health care maintenance, as well as the implications of having one remaining kidney. This discussion included, but was not limited to rates of complications such as bleeding, infection, need for transfusion, reoperation, other organ injury, future bowel obstruction, incisional hernia, port site pain, varicocele, testicular pain,  venous thrombosis, pulmonary embolism, renal failure, and death (3 per 10,000).     We discussed long-term risks in detail.  I discussed the articles suggesting an small increase in ESKD in donors and their limitations    We discussed the national paired system and the possibility of participating, if not a match for the intended recipient.   I explained how the system worked.  Given that he was interested in nondirected donation if his mentor was not a transplant candidate, we discussed the option of donating directly to a recipient in our program; and the advantage would be the option to choose the date of donation. And we discussed the advantage of starting  a  chain  in the national paired exchange system; and the advantage would be that multiple transplants would occur as a result of the donation (but the logistics were more complicated).  I explained how a chain was developed and worked.    We had an extensive discussion about the fact that his family (mother, daughter,  soon to be ex ) were not  supportive of his doing this.  We discussed the fact that he could choose to not move forward; discussed having his family watch our donor video (and I offered to spend time discussing donor risks with them if they were interested).  We also discussed the fact that for many of our nondirected donors, family is initially non-supportive but become supportive    At the conclusion of the visit, all questions had been answered.  I explained that his candidacy for donation will be reviewed at our Multidisciplinary Donor Selection Committee, and that he would subsequently be contacted   by his coordinator.  I recommended that he call his coordinator if there any additional questions at the end of the evaluation process; and that we would be glad to spend time discussing any concerns.    Total time: 55 minutes  Counselling time: 45 minutes        Brooks Christie MD  Surgical Director, Kidney Transplantation                                                                                                        ---------------------------------------------------------------------------------------------------    HPI: Mr. Young wishes to donate a kidney to his mentor, possibly nondirected.    [facility] Medical History   Kidney Stones   Surgical History Arthroscopy, Left  "Knee  Arthroscopy, Right Knee  Repair, Detached Retina   Allergies Chlorhexidine : Rash  Penicillin : Rash  Social History EtOH: Rare (1-2 drinks/month)  Illicit Drug Use: Denies  Tobacco: Denies   Self-Reported Functional Status \"I am able to participate in strenuous sports such as swimming, singles tennis, football, basketball, or skiing\"      Family Medical History Cancer (denies)  Diabetes (denies)  Heart Disease (Grandparent)  Hypertension (denies)  Kidney Disease (denies)  Kidney Stones (Grandparent, Aunt or Uncle, Mother, Cousin) Exercise Frequency Exercise (3 X per week)     Potential recipient: possibly his mentor; possibly nondirected    Past Medical History:   Diagnosis Date     NO ACTIVE PROBLEMS      Past Surgical History:   Procedure Laterality Date     SURGICAL HISTORY OF -       detached retina     SURGICAL HISTORY OF -       arthroscopic knee surgery-Right x1, left x1, plica repair     Family History   Problem Relation Age of Onset     Cerebrovascular Disease Maternal Grandmother      Osteoporosis Mother      Lipids Father      Social History     Socioeconomic History     Marital status:      Spouse name: Not on file     Number of children: Not on file     Years of education: Not on file     Highest education level: Not on file   Occupational History     Occupation: Build furniture     Employer: SELF   Social Needs     Financial resource strain: Not on file     Food insecurity     Worry: Not on file     Inability: Not on file     Transportation needs     Medical: Not on file     Non-medical: Not on file   Tobacco Use     Smoking status: Never Smoker     Smokeless tobacco: Never Used   Substance and Sexual Activity     Alcohol use: Yes     Drug use: No     Sexual activity: Yes     Partners: Female   Lifestyle     Physical activity     Days per week: Not on file     Minutes per session: Not on file     Stress: Not on file   Relationships     Social connections     Talks on phone: Not on file    "  Gets together: Not on file     Attends Jain service: Not on file     Active member of club or organization: Not on file     Attends meetings of clubs or organizations: Not on file     Relationship status: Not on file     Intimate partner violence     Fear of current or ex partner: Not on file     Emotionally abused: Not on file     Physically abused: Not on file     Forced sexual activity: Not on file   Other Topics Concern     Parent/sibling w/ CABG, MI or angioplasty before 65F 55M? Not Asked   Social History Narrative     Not on file       ROS:   CONSTITUTIONAL:  No fevers or chills  EYES: negative for icterus  ENT:  negative for hearing loss, tinnitus and sore throat  RESPIRATORY:  negative for cough, sputum, dyspnea  CARDIOVASCULAR:  negative for chest pain  GASTROINTESTINAL:  negative for nausea, vomiting, diarrhea or constipation  GENITOURINARY:  negative for incontinence, dysuria, bladder emptying problems  HEME:  No easy bruising  INTEGUMENT:  negative for rash and pruritus  NEURO:  Negative for headache, seizure disorder    Allergies:   Allergies   Allergen Reactions     Bee Venom Swelling     Gets hot     Chloraprep One Step Itching     Has a sensitivity     Penicillins        Medications:  Prescription Medications as of 2021       Rx Number Disp Refills Start End Last Dispensed Date Next Fill Date Owning Pharmacy    ALEVE 220 MG OR CAPS            Si CAPSULE EVERY 12 HOURS AS NEEDED    Class: Historical    Route: Oral    ibuprofen (IBUPROFEN) 200 MG tablet            Sig: Take 200 mg by mouth every 4 hours as needed for mild pain    Class: Historical    Route: Oral            Labs:   ABO: A  Chemistries:   Recent Labs   Lab Test 21  0804 21  0747   *  --    POTASSIUM 4.3  --    CHLORIDE 114*  --    CO2 28  --    ANIONGAP 3  --    GLC 98 96   BUN 12  --    CR 0.96 0.97   ROSENDO 8.6  --        Urine Studies:   Recent Labs   Lab Test 21  0810 21  0756   COLOR Yellow  Yellow   APPEARANCE Clear Clear   URINEGLC Negative Negative   URINEBILI Negative Negative   URINEKETONE Negative Negative   SG 1.016 1.017   UBLD Negative Negative   URINEPH 6.0 5.0   PROTEIN Negative Negative   NITRITE Negative Negative   LEUKEST Negative Negative   RBCU <1 1   WBCU 1 1     Recent Labs   Lab Test 04/12/21  0810 02/02/21  0756   UTPG 0.11 0.13   MICROL 9 15       Hematology:      Recent Labs   Lab Test 04/12/21  0804   WBC 4.5   RBC 5.42   HGB 15.2   HCT 47.2   MCV 87   MCH 28.0   MCHC 32.2   RDW 13.8          Coags:   Recent Labs   Lab Test 04/12/21  0804   INR 1.04       Lipid Profile:   Cholesterol   Date Value Ref Range Status   04/12/2021 182 <200 mg/dL Final     Triglycerides   Date Value Ref Range Status   04/12/2021 162 (H) <150 mg/dL Final     Comment:     Borderline high:  150-199 mg/dl  High:             200-499 mg/dl  Very high:       >499 mg/dl       HDL Cholesterol   Date Value Ref Range Status   04/12/2021 45 >39 mg/dL Final     LDL Cholesterol Calculated   Date Value Ref Range Status   04/12/2021 105 (H) <100 mg/dL Final     Comment:     Above desirable:  100-129 mg/dl  Borderline High:  130-159 mg/dL  High:             160-189 mg/dL  Very high:       >189 mg/dl       Non HDL Cholesterol   Date Value Ref Range Status   04/12/2021 137 (H) <130 mg/dL Final     Comment:     Above Desirable:  130-159 mg/dl  Borderline high:  160-189 mg/dl  High:             190-219 mg/dl  Very high:       >219 mg/dl         Virals:  CMV and EBV pending.     Recent Labs   Lab Test 04/12/21  0804   HBCAB Nonreactive   HEPBANG Nonreactive        Hepatitis C Antibody   Date Value Ref Range Status   04/12/2021 Nonreactive NR^Nonreactive Final     Comment:     Assay performance characteristics have not been established for newborns,   infants, and children         Hepatitis C Antibody   Date Value Ref Range Status   04/12/2021 Nonreactive NR^Nonreactive Final     Comment:     Assay performance  characteristics have not been established for newborns,   infants, and children         Again, thank you for allowing me to participate in the care of your patient.        Sincerely,        Brooks Christie MD

## 2021-04-16 NOTE — LETTER
"    4/16/2021         RE: Manfred Young  [address]  [facility] [address]        Dear Colleague,    Thank you for referring your patient, Manfred Young, to the [facility]. Please see a copy of my visit note below.    Iohexol Timed Test Nursing Note    Manfred Young comes to [facility] today for a Iohexol GFR Timed test.   Orders from Basil Smalls MD  were completed.    Progress Note      The following information was verified with the patient:  *Female patients are not pregnant or could not have become recently pregnant: YES, No, Not applicable  *Is there a history of allergy (skin rash, swelling, ect) to :   a. Iodine (except skin reactions to Betadine): NO   b. Intravenous radio-contrast agents: NO   c. Seafood: NO     RN provided patient with educational handout regarding timed test. YES    Medication administered :   Iohexol (Omnipaque 300 mg Iodine/ ml concentration) 5 mls.  Site administered Right AC  Start [id number]  Stop [id number]    Left AC PIV left in place for Blood draws/ CT.      Drug Waste Record    Drug Name: Iohexol  Dose: 5 ml  Route administered: IV  NDC #: [id number]  Amount of waste(mL):5  Reason for waste: Single use vial        Patient tolerated the procedure well    Vitals were reviewed   /77   Pulse 55   Temp 98.4  F (36.9  C) (Oral)   Resp 16   Ht 1.854 m (6' 1\")   Wt 93.6 kg (206 lb 6.4 oz)   SpO2 98%   BMI 27.23 kg/m      Discharge Plan    Discharge instructions reviewed with patient: YES  Discharge papers printed and given to patient: YES  Patient/Representative verbalized understanding, all questions answered: YES        Cherie Chamberlain, DAMIAN          Again, thank you for allowing me to participate in the care of your patient.        Sincerely,        [facility]    "
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
## 2021-04-19 LAB — INTERPRETATION ECG - MUSE: NORMAL

## 2021-04-20 LAB
BSA: 2.21 M2
IOHEXOL CL UR+SERPL-VRATE: 113 ML/MIN
IOHEXOL CL UR+SERPL-VRATE: 3.33 MG/DL
IOHEXOL CL UR+SERPL-VRATE: 6.85 MG/DL
IOHEXOL CL UR+SERPL-VRATE: 88 /1.73 M2

## 2021-04-21 ENCOUNTER — COMMITTEE REVIEW (OUTPATIENT)
Dept: TRANSPLANT | Facility: CLINIC | Age: 51
End: 2021-04-21

## 2021-04-21 ENCOUNTER — TELEPHONE (OUTPATIENT)
Dept: TRANSPLANT | Facility: CLINIC | Age: 51
End: 2021-04-21

## 2021-04-21 NOTE — TELEPHONE ENCOUNTER
I called and let Manfred know that he cnnot be a donor due to bilateral stones.  It is recommended that he follow up with a stone former clinic with his own PCP, He will do this.

## 2021-04-21 NOTE — COMMITTEE REVIEW
Abdominal Committee Review Note     Evaluation Date:   Committee Review Date: 4/21/2021    Organ being evaluated for: Kidney    Transplant Phase:   Transplant Status:     Transplant Coordinator: No matching coordinators  Transplant Surgeon:       Referring Physician: Referred Self    Primary Diagnosis:   Secondary Diagnosis:     Committee Review Members:  Immunology Ze Amaya, PhD   Nephrology Basil Smalls MD   Nutrition Zoe Cerrato, RD   Pharmacy Eugene Hyman, [facility]    - Clinical Radha Sinha, [facility], Lizbeth Faustin, [facility]   Transplant Armida Beth Christianson, RN, Lauren Pruitt, RN, Joe Muhammad MD, Pallavi Mcdonald, RN, Fabiola Leslie, DAMIAN, Noemy Dee APRN CNP, Geeta Willis, DAMIAN, Jonas Lepe MD, Kami Gamboa MD, MD       Transplant Eligibility: Acceptable Mental Health, bilateral stones    Committee Review Decision: Declined    Relative Contraindications: None    Absolute Contraindications: History of 3 or more kidney stones in the past 5 years    Committee Chair Kami Gamboa MD verbally attested to the committee's decision.    Committee Discussion Details: Committee Discussion Details:     Reviewed Efren's abnormal evaluation findings:    Also of note: Bilateral calcifications in kidneys    CT Reviewed and decision to use  kidney for donation.    Next Steps: Denied.    Call patient to discuss results/reccomendations     If patient interested in continuing in donor eval, send appropriate orders/billing info

## 2021-04-22 NOTE — PROGRESS NOTES
[facility]  Consult Note     Medical record number: [id number]  YOB: 1970,     Date of Visit:  4/16/22  Consult requested by the patient for evaluation of kidney donation candidacy.    Assessment and Recommendations: Mr. Young appears to be a good candidate for kidney donation at this point in the evaluation. The following issues will need to be addressed prior to formal review:  Iohexal GFR  CT angio    Risks of the surgical procedure including but not limited to the rare risk of mortality discussed in detail. Patient verbalized good understanding and had several pertinent questions which were answered.     The majority of our visit today was spent in counseling regarding the medical and surgical risks of kidney donation (including long-term risks) ; the typical marge-and post-operative experience and recovery/return to work pattern; restrictions related to the surgery (driving; lifting; exercise).      We also talked about post-op visits and longer term health care maintenance, as well as the implications of having one remaining kidney. This discussion included, but was not limited to rates of complications such as bleeding, infection, need for transfusion, reoperation, other organ injury, future bowel obstruction, incisional hernia, port site pain, varicocele, testicular pain,  venous thrombosis, pulmonary embolism, renal failure, and death (3 per 10,000).     We discussed long-term risks in detail.  I discussed the articles suggesting an small increase in ESKD in donors and their limitations    We discussed the national paired system and the possibility of participating, if not a match for the intended recipient.  I explained how the system worked.  Given that he was interested in nondirected donation if his mentor was not a transplant candidate, we discussed the option of donating directly to a recipient in our program; and the advantage would be the option to choose  "the date of donation. And we discussed the advantage of starting  a  chain  in the national paired exchange system; and the advantage would be that multiple transplants would occur as a result of the donation (but the logistics were more complicated).  I explained how a chain was developed and worked.    We had an extensive discussion about the fact that his family (mother, daughter,  soon to be ex ) were not  supportive of his doing this.  We discussed the fact that he could choose to not move forward; discussed having his family watch our donor video (and I offered to spend time discussing donor risks with them if they were interested).  We also discussed the fact that for many of our nondirected donors, family is initially non-supportive but become supportive    At the conclusion of the visit, all questions had been answered.  I explained that his candidacy for donation will be reviewed at our Multidisciplinary Donor Selection Committee, and that he would subsequently be contacted   by his coordinator.  I recommended that he call his coordinator if there any additional questions at the end of the evaluation process; and that we would be glad to spend time discussing any concerns.    Total time: 55 minutes  Counselling time: 45 minutes        Brooks Christie MD  Surgical Director, Kidney Transplantation                                                                                                        ---------------------------------------------------------------------------------------------------    HPI: Mr. Young wishes to donate a kidney to his mentor, possibly nondirected.    [facility] Medical History   Kidney Stones   Surgical History Arthroscopy, Left Knee  Arthroscopy, Right Knee  Repair, Detached Retina   Allergies Chlorhexidine : Rash  Penicillin : Rash  Social History EtOH: Rare (1-2 drinks/month)  Illicit Drug Use: Denies  Tobacco: Denies   Self-Reported Functional Status \"I am able to participate in " "strenuous sports such as swimming, singles tennis, football, basketball, or skiing\"      Family Medical History Cancer (denies)  Diabetes (denies)  Heart Disease (Grandparent)  Hypertension (denies)  Kidney Disease (denies)  Kidney Stones (Grandparent, Aunt or Uncle, Mother, Cousin) Exercise Frequency Exercise (3 X per week)     Potential recipient: possibly his mentor; possibly nondirected    Past Medical History:   Diagnosis Date     NO ACTIVE PROBLEMS      Past Surgical History:   Procedure Laterality Date     SURGICAL HISTORY OF -       detached retina     SURGICAL HISTORY OF -       arthroscopic knee surgery-Right x1, left x1, plica repair     Family History   Problem Relation Age of Onset     Cerebrovascular Disease Maternal Grandmother      Osteoporosis Mother      Lipids Father      Social History     Socioeconomic History     Marital status:      Spouse name: Not on file     Number of children: Not on file     Years of education: Not on file     Highest education level: Not on file   Occupational History     Occupation: Build furniture     Employer: SELF   Social Needs     Financial resource strain: Not on file     Food insecurity     Worry: Not on file     Inability: Not on file     Transportation needs     Medical: Not on file     Non-medical: Not on file   Tobacco Use     Smoking status: Never Smoker     Smokeless tobacco: Never Used   Substance and Sexual Activity     Alcohol use: Yes     Drug use: No     Sexual activity: Yes     Partners: Female   Lifestyle     Physical activity     Days per week: Not on file     Minutes per session: Not on file     Stress: Not on file   Relationships     Social connections     Talks on phone: Not on file     Gets together: Not on file     Attends Baptism service: Not on file     Active member of club or organization: Not on file     Attends meetings of clubs or organizations: Not on file     Relationship status: Not on file     Intimate partner violence     " Fear of current or ex partner: Not on file     Emotionally abused: Not on file     Physically abused: Not on file     Forced sexual activity: Not on file   Other Topics Concern     Parent/sibling w/ CABG, MI or angioplasty before 65F 55M? Not Asked   Social History Narrative     Not on file       ROS:   CONSTITUTIONAL:  No fevers or chills  EYES: negative for icterus  ENT:  negative for hearing loss, tinnitus and sore throat  RESPIRATORY:  negative for cough, sputum, dyspnea  CARDIOVASCULAR:  negative for chest pain  GASTROINTESTINAL:  negative for nausea, vomiting, diarrhea or constipation  GENITOURINARY:  negative for incontinence, dysuria, bladder emptying problems  HEME:  No easy bruising  INTEGUMENT:  negative for rash and pruritus  NEURO:  Negative for headache, seizure disorder    Allergies:   Allergies   Allergen Reactions     Bee Venom Swelling     Gets hot     Chloraprep One Step Itching     Has a sensitivity     Penicillins        Medications:  Prescription Medications as of 2021       Rx Number Disp Refills Start End Last Dispensed Date Next Fill Date Owning Pharmacy    ALEVE 220 MG OR CAPS            Si CAPSULE EVERY 12 HOURS AS NEEDED    Class: Historical    Route: Oral    ibuprofen (IBUPROFEN) 200 MG tablet            Sig: Take 200 mg by mouth every 4 hours as needed for mild pain    Class: Historical    Route: Oral            Labs:   ABO: A  Chemistries:   Recent Labs   Lab Test 21  0804 21  0747   *  --    POTASSIUM 4.3  --    CHLORIDE 114*  --    CO2 28  --    ANIONGAP 3  --    GLC 98 96   BUN 12  --    CR 0.96 0.97   ROSENDO 8.6  --        Urine Studies:   Recent Labs   Lab Test 21  0810 21  0756   COLOR Yellow Yellow   APPEARANCE Clear Clear   URINEGLC Negative Negative   URINEBILI Negative Negative   URINEKETONE Negative Negative   SG 1.016 1.017   UBLD Negative Negative   URINEPH 6.0 5.0   PROTEIN Negative Negative   NITRITE Negative Negative   LEUKEST  Negative Negative   RBCU <1 1   WBCU 1 1     Recent Labs   Lab Test 04/12/21  0810 02/02/21  0756   UTPG 0.11 0.13   MICROL 9 15       Hematology:      Recent Labs   Lab Test 04/12/21  0804   WBC 4.5   RBC 5.42   HGB 15.2   HCT 47.2   MCV 87   MCH 28.0   MCHC 32.2   RDW 13.8          Coags:   Recent Labs   Lab Test 04/12/21  0804   INR 1.04       Lipid Profile:   Cholesterol   Date Value Ref Range Status   04/12/2021 182 <200 mg/dL Final     Triglycerides   Date Value Ref Range Status   04/12/2021 162 (H) <150 mg/dL Final     Comment:     Borderline high:  150-199 mg/dl  High:             200-499 mg/dl  Very high:       >499 mg/dl       HDL Cholesterol   Date Value Ref Range Status   04/12/2021 45 >39 mg/dL Final     LDL Cholesterol Calculated   Date Value Ref Range Status   04/12/2021 105 (H) <100 mg/dL Final     Comment:     Above desirable:  100-129 mg/dl  Borderline High:  130-159 mg/dL  High:             160-189 mg/dL  Very high:       >189 mg/dl       Non HDL Cholesterol   Date Value Ref Range Status   04/12/2021 137 (H) <130 mg/dL Final     Comment:     Above Desirable:  130-159 mg/dl  Borderline high:  160-189 mg/dl  High:             190-219 mg/dl  Very high:       >219 mg/dl         Virals:  CMV and EBV pending.     Recent Labs   Lab Test 04/12/21  0804   HBCAB Nonreactive   HEPBANG Nonreactive        Hepatitis C Antibody   Date Value Ref Range Status   04/12/2021 Nonreactive NR^Nonreactive Final     Comment:     Assay performance characteristics have not been established for newborns,   infants, and children         Hepatitis C Antibody   Date Value Ref Range Status   04/12/2021 Nonreactive NR^Nonreactive Final     Comment:     Assay performance characteristics have not been established for newborns,   infants, and children

## 2021-04-23 NOTE — PROGRESS NOTES
Assessment & Plan     History of nephrolithiasis  Calcium oxalate stone in 2010 and no other episodes but found to have multiple calcifications on recent renal CT that disqualified him from transplant. He primarily wanted to discuss dietary changes he can do to prevent stone recurrence. Reviewed increase water intake, avoid soda/sugar beverages. Incorporate calcium from diet, potassium from diet; decrease sugar, sodium, oxalates, high doses of vit c or calcium supplements. Further hand outs to provided.     Follow up for yearly physical.     Shari Bowser MD  [facility]    Radha Shearer is a 50 year old who presents for the following health issues    HPI     Recently failed kidney transplant evaluation. Had been considering for past 20 years. Feeling disappointed about this. Failure due to stones seen on imaging.    History of calcium oxalate 5 mm stone in 7/2010. No others since. Pertinent family history for stones. Wonders what he can do to prevent stone recurrence. Follows a vegan diet --lots of green leafy vegetables, beans, nuts. Drinking a lot of water. High oxalate intake.     Due for physical.       Objective    /82   Temp 98.2  F (36.8  C)   Wt 93 kg (205 lb 0.6 oz)   BMI 27.05 kg/m    Body mass index is 27.05 kg/m .  Physical Exam   GENERAL: healthy, alert and no distress  EYES: Eyes grossly normal to inspection, PERRL and conjunctivae and sclerae normal  PSYCH: mentation appears normal, affect normal/bright    CTA renal 4/16/21:    Right kidney:     1. Right kidney measures: 12.3 cm.  2. Renal arteries: 2, in renal artery 1, 5.9 cm from ostium to first  branch, in the second (more inferior renal artery), 4.1 cm from ostium  to first branch.   3. Renal veins: 2  4. Ureters: 1  5. Renal parenchyma: Punctate calcifications in the inferior pole  (series 3 image 45).  6. Renal volume: 204 cc.     Left kidney:     1. Left kidney measures: 12.2 cm.  2. Renal  arteries: 1, 2.0 cm from ostium to first branch.  3. Renal veins: 1 retroaortic  4. Ureters: 1  5. Renal parenchyma: Punctate calcification in the inferior pole  (series 3 image 42).  6. Renal volume: 200 cc.

## 2021-04-25 NOTE — PATIENT INSTRUCTIONS
"Visit [employer] for information about cooking, nutrition, one on one dietary counseling, mind-body wellness and detox programs. Email [email or web address] and let them know that Shari Bowser MD sent you. They offer a complimentary Zoom call to see what program might work best for you.       Dietary modification -- From the viewpoint of diet, increasing the intake of fluid, dietary calcium, potassium, and phytate may be beneficial. In addition, decreasing the intake of oxalate, animal protein, sucrose, fructose, sodium, supplemental vitamin C, and supplemental calcium (as opposed to dietary calcium) may reduce the risk of stones [1] (see \"Kidney stones in adults: Epidemiology and risk factors\"). The role of vitamin D intake in recurrent stone formation remains unclear, though one study found that usual ranges of vitamin D intake from food and supplements were not associated with a high risk of an incident kidney stone [2]. However, in individual patients, high vitamin D intake from supplements may increase risk.  The importance of a possible \"stone clinic\" effect following dietary recommendations should not be underestimated. One study found that 58 percent of 108 patients evaluated for kidney stones had, over a five-year period, no evidence of active stone disease after a visit to the stone clinic in which minimal advice was given regarding diet and fluid intake [3]. Those patients who did not form new stones showed an increase in urine volume at follow-up versus no change in those who continued to form stones.  Increase fluid intake -- Increasing fluid intake, spread throughout the day (although it is not essential that the patient wake up several times per night to urinate), will increase the urine flow rate and lower the urine solute concentration, both of which protect against stone formation [4]. In one prospective trial, 199 patients with a first calcium oxalate stone were randomly " "assigned to no therapy or recommendation of a high fluid intake to produce at least 2 liters of urine per day [5]. At five years, the incidence of new stone formation was significantly lower in the treated patients than in those in the control group (12 versus 27 percent).  Similar findings were noted in a prospective clinical series [6]. Stone formers who remained free of stones were noted to have a greater increase in urine volume than those who had recurrent disease (320 mL/day versus no change). This study emphasizes that even small increases in fluid intake can reduce the risk of new stone formation.  Type of fluid -- The risk of stone formation might be affected by the type of beverage consumed. However, only observational data are principally available and presented in more detail in a separate topic review. (See \"Kidney stones in adults: Epidemiology and risk factors\".)  ?Coffee, tea, and alcohol have been reported in prospective observational studies to be associated with a lower risk of stones. Thus, there is no evidence that these beverages should be avoided to prevent stone formation.  ?Cranberry juice, advocated as prophylaxis against recurrent urinary tract infections, increased the urinary saturation of calcium oxalate when ingested in large amounts (one liter per day) [7]. Ingestion of moderate amounts is unlikely to be harmful, and there is no evidence that this beverage is beneficial for stone prevention.  ?Results from one randomized trial suggest that reducing soft drink consumption may reduce the risk of stone recurrence, although it is unclear what fluid replaced the soft drink [8]. Observational data also suggest that sugar-sweetened beverages are associated with a higher risk of stone formation [9]. We suggest that patients avoid calorie-containing beverages, such as sweetened soda, to avoid weight gain with the general increase in fluid intake.  Reduce nondairy animal protein " "intake -- Adverse changes in urinary calcium and citrate excretion can be induced by a high-protein diet since the metabolism of sulfur-containing amino acids increases the daily acid load by generating sulfuric acid (figure 1). Nondairy animal protein is much more likely to induce this effect than vegetable protein since it has a higher sulfur content and therefore generates more acid [10]. How acid loading produces these changes is discussed elsewhere. (See \"Kidney stones in adults: Epidemiology and risk factors\", section on 'Protein'.)  Thus, lowering animal protein intake will produce favorable changes in the urine [1]. However, it has not been proven that this will reduce the incidence of stone formation. In one randomized trial, for example, reduced animal protein in association with higher dietary calcium and lower dietary sodium reduced the risk of stone recurrence [11], but the individual impact of animal protein could not be determined. A subsequent randomized trial found that, compared with a high fluid intake plus a diet rich in calcium, a low animal protein intake does not reduce stone recurrence [12]. In addition, observational studies have been inconsistent, suggesting that a high-animal protein diet was a risk factor for incident kidney stones in males and older females but not in younger females [13]. Based upon the available data, it would be prudent to avoid excessive nondairy animal protein intake for all calcium stone formers.  Increase fruit and vegetable intake -- Foods that are rich in potassium, particularly fruits and vegetables, may be beneficial. Increasing intake of fruits and vegetables, regardless of 24-hour urine values, may reduce the risk of calcium oxalate stone formation, particularly in patients who self-select a diet that is low in fruits and vegetables. This benefit is primarily the result of increasing citrate excretion [14]. Observational studies have consistently found a " substantially lower risk of incident stone formation in those with diets rich in potassium [13].  Limit dietary oxalate intake -- Some foods contain very large amounts of oxalate and those should be avoided (eg, spinach, rhubarb, potatoes). In addition, some nuts and legumes are also high in oxalate and the intake should be limited (eg, peanuts, cashews, and almonds). The oxalate content of foods is available at the following website.  However, there is scant evidence that low-oxalate diets reduce the risk of stone formation. In prospective observational studies of individuals who had never had a stone, higher dietary oxalate only slightly increased the risk of incident stone formation in males and older females; there was no association in younger females [15]. The risk was higher with increasing oxalate intake among those with calcium intake below the median, again demonstrating that adequate dietary calcium intake may reduce the risk of stone formation. Because of the documented health benefits of many foods that are traditionally considered high in oxalate (but still contain 10 mg or less of oxalate per serving), strict oxalate restriction does not seem to be supported. As noted above, some foods traditionally believed to be high in oxalate, such as tea, do not increase the risk of stone formation. For all calcium oxalate stone formers, foods that are very high in oxalate (eg, spinach, potatoes) should be avoided regardless of urine oxalate since there may be a period of very high urinary oxalate excretion soon after the food is consumed. If a low-oxalate diet is recommended, it should only be continued if there is documented evidence that the urine oxalate excretion has fallen.  Limit sodium intake -- Calcium is reabsorbed passively in the proximal tubule down the favorable concentration gradient created by the reabsorption of sodium and water. A review of renal calcium handling can be found elsewhere. (See  "\"Diuretics and calcium balance\".)  Thus, a low-sodium diet (to 80 to 100 mEq/day) can enhance proximal sodium and calcium reabsorption, leading to a reduction in calcium excretion [11,16]. In one study, for example, lowering sodium intake from 200 to 80 mEq/day diminished calcium excretion by as much as 100 mg/day (2.5 mmol/day) [16]. Although the independent contribution of lowering dietary sodium intake on actual stone formation is unknown and higher sodium intake may be associated with higher urine volume, it is likely an important component of a regimen that has been demonstrated to reduce recurrent stone formation [11,17].  Limit sucrose and fructose intake -- Sucrose intake increases urine calcium independent of calcium intake and has been associated with an increased risk of stones [18]. Fructose intake also is associated with an increased risk of stone formation [19].  Calcium intake -- Higher urine calcium is a common finding in stone formers, but restricting dietary calcium intake is not generally recommended unless it is excessive (more than 1500 mg/day). Although urine calcium excretion may decrease with restriction, the decrease in free intestinal calcium can lead to increased absorption of dietary oxalate and enhanced oxalate excretion, due to decreased binding of oxalate by calcium in the intestinal lumen. The net effect may be increased supersaturation of the urine with respect to calcium oxalate and an enhanced tendency to stone formation. (See \"Kidney stones in adults: Epidemiology and risk factors\", section on 'Dietary factors'.)  The ability to help prevent new stone formation with adequate calcium intake was shown in part by a five year study that compared two diets among males with idiopathic hypercalciuria and recurrent calcium oxalate stones [11]. In this trial, 120 such males were randomly assigned to either a diet consisting of calcium (1200 mg/day [30 mmol/day]) and low amounts of animal " "protein (52 g/day) and salt (2900 mg/day [50 mmol/day] of sodium chloride) or a diet containing a low amount of calcium (400 mg/day [10 mmol/day]).  At five years, a significantly lower risk of stone recurrence was observed among the group assigned to the adequate calcium, low animal protein, low-salt diet (unadjusted relative risk of 0.49 with a CI of 0.24 to 0.98). This selective benefit likely arose because of a decrease in urinary oxalate excretion compared with an increase with the low-calcium diet; the urine calcium actually decreased in both treatment arms. However, the independent effect of calcium is unclear given that the amounts of animal protein and salt ingested among those in the low-calcium diet differed from that of patients in the normal-calcium diet group. Nonetheless, the low calcium intervention was not beneficial and is not recommended.  In addition to increasing stone formation, a low-calcium diet may have a second deleterious effect in patients with idiopathic hypercalciuria: development of negative calcium balance [1,20]. This extra loss of calcium can exacerbate the already diminished bone density in some of these patients [20,21], a complication that may be due to enhanced bone resorption.  It should be noted that calcium supplements do not appear to be effective in preventing recurrent stones and may even slightly increase risk [22,23]. (See \"Kidney stones in adults: Epidemiology and risk factors\", section on 'Calcium'.)  In patients with a history of stones who require calcium supplements (eg, for the treatment of osteoporosis), a suggested approach is to measure urinary calcium excretion before and approximately one month after starting the calcium supplement. If there is a clinically important increase in urinary calcium excretion, the supplement should be discontinued or the dose should be reduced. In this setting, the initiation of a thiazide diuretic may be useful to reduce urinary " calcium excretion (and to help maintain bone density). (See 'High urine calcium' below.)  Other factors -- High-dose supplemental vitamin C appears to increase urine oxalate excretion in certain individuals [24,25] and the risk of stone formation [26]; thus, high dose supplements should be avoided in those with higher urine oxalate excretion. Phytate appears to decrease the risk of stones in females [27]. Although not simply a matter of dietary intake, higher body mass index increases the risk of stone formation, particularly in females [28]. Therefore, weight control may be helpful in preventing stone recurrence.

## 2021-04-27 ENCOUNTER — OFFICE VISIT (OUTPATIENT)
Dept: FAMILY MEDICINE | Facility: CLINIC | Age: 51
End: 2021-04-27
Payer: COMMERCIAL

## 2021-04-27 VITALS
SYSTOLIC BLOOD PRESSURE: 116 MMHG | TEMPERATURE: 98.2 F | DIASTOLIC BLOOD PRESSURE: 82 MMHG | BODY MASS INDEX: 27.05 KG/M2 | WEIGHT: 205.04 LBS

## 2021-04-27 DIAGNOSIS — Z87.442 HISTORY OF NEPHROLITHIASIS: Primary | ICD-10-CM

## 2021-04-27 PROCEDURE — 99212 OFFICE O/P EST SF 10 MIN: CPT | Performed by: STUDENT IN AN ORGANIZED HEALTH CARE EDUCATION/TRAINING PROGRAM

## 2021-09-11 ENCOUNTER — HEALTH MAINTENANCE LETTER (OUTPATIENT)
Age: 51
End: 2021-09-11

## 2022-01-27 ENCOUNTER — PRE VISIT (OUTPATIENT)
Dept: UROLOGY | Facility: CLINIC | Age: 52
End: 2022-01-27
Payer: COMMERCIAL

## 2022-01-27 NOTE — TELEPHONE ENCOUNTER
MEDICAL RECORDS REQUEST   [facility]  Urology Clinic  [address] [place] [address]  PHONE: [phone]  Fax: [phone]        FUTURE VISIT INFORMATION                                                   Manfred Ze Young, : 1970 scheduled for future visit at [facility]    APPOINTMENT INFORMATION:    Date: 2022    Provider:  Emi Glover PA    Reason for Visit/Diagnosis: enlarged prostate    REFERRAL INFORMATION:    Referring provider:  N/A    Specialty: N/A    Referring providers clinic:  N/A    Clinic contact number:  N/A    RECORDS REQUESTED FOR VISIT                                                     NOTES  STATUS/DETAILS   OFFICE NOTE from referring provider  no   OFFICE NOTE from other specialist  yes, 2021 -- Darin Anderson MD    DISCHARGE SUMMARY from hospital  no   DISCHARGE REPORT from the ER  no   OPERATIVE REPORT  no   MEDICATION LIST  yes, Care Everywhere - [facility]   LABS     URINALYSIS (UA)  yes   URINE CYTOLOGY  no   IMAGING (IMAGES & REPORT)  no   PSA (LAB)  yes     PRE-VISIT CHECKLIST      Record collection complete Yes   Appointment appropriately scheduled           (right time/right provider) Yes   Joint diagnostic appointment coordinated correctly          (ensure right order & amount of time) Yes   MyChart activation Yes   Questionnaire complete If no, please explain pending

## 2022-01-28 ENCOUNTER — VIRTUAL VISIT (OUTPATIENT)
Dept: UROLOGY | Facility: CLINIC | Age: 52
End: 2022-01-28
Payer: COMMERCIAL

## 2022-01-28 DIAGNOSIS — R35.1 NOCTURIA: Primary | ICD-10-CM

## 2022-01-28 PROCEDURE — 99204 OFFICE O/P NEW MOD 45 MIN: CPT | Mod: 95 | Performed by: PHYSICIAN ASSISTANT

## 2022-01-28 RX ORDER — TAMSULOSIN HYDROCHLORIDE 0.4 MG/1
1 CAPSULE ORAL DAILY
COMMUNITY
Start: 2021-11-16 | End: 2022-01-28

## 2022-01-28 RX ORDER — TADALAFIL 5 MG/1
1 TABLET ORAL DAILY
COMMUNITY
Start: 2021-09-08 | End: 2023-11-30

## 2022-01-28 RX ORDER — MIRABEGRON 25 MG/1
25 TABLET, FILM COATED, EXTENDED RELEASE ORAL DAILY
Qty: 30 TABLET | Refills: 3 | Status: SHIPPED | OUTPATIENT
Start: 2022-01-28 | End: 2022-02-09

## 2022-01-28 NOTE — NURSING NOTE
Chief Complaint   Patient presents with     Consult     BPH       Patient Active Problem List   Diagnosis     CARDIOVASCULAR SCREENING; LDL GOAL LESS THAN 160     Patellofemoral pain syndrome     Knee pain     Transplant donor evaluation     Postsurgical retinal scar       Allergies   Allergen Reactions     Bee Venom Swelling     Gets hot     Chloraprep One Step Itching     Has a sensitivity     Penicillins Diarrhea     Wasp Venom Protein Other (See Comments)     High fever after being stung by bee.       Current Outpatient Medications   Medication Sig Dispense Refill     tadalafil (CIALIS) 5 MG tablet Take 1 tablet by mouth daily       tamsulosin (FLOMAX) 0.4 MG capsule Take 1 capsule by mouth daily       ALEVE 220 MG OR CAPS 1 CAPSULE EVERY 12 HOURS AS NEEDED       ibuprofen (IBUPROFEN) 200 MG tablet Take 200 mg by mouth every 4 hours as needed for mild pain         Social History     Tobacco Use     Smoking status: Never Smoker     Smokeless tobacco: Never Used   Substance Use Topics     Alcohol use: Yes     Drug use: No       Kimberly Huerta LPN  1/28/2022  8:41 AM

## 2022-01-28 NOTE — PROGRESS NOTES
Manfred is a 51 year old who is being evaluated via a billable video visit.      How would you like to obtain your AVS? [employer]  If the video visit is dropped, the invitation should be resent by: Send to e-mail at: [email or web address]  Will anyone else be joining your video visit? No      Video Start Time: 9:08 AM  Video-Visit Details    Type of service:  Video Visit    Video End Time:9:24 AM    Originating Location (pt. Location): Home    Distant Location (provider location):  [facility]     Platform used for Video Visit: Flimmer for video and imedo for audio

## 2022-01-28 NOTE — LETTER
1/28/2022       RE: Manfred Young  [address]  [facility] [address]     Dear Colleague,    Thank you for referring your patient, Manfred Young, to the [facility] [place] at [facility]. Please see a copy of my visit note below.    Manfred is a 51 year old who is being evaluated via a billable video visit.      How would you like to obtain your AVS? MyChart  If the video visit is dropped, the invitation should be resent by: Send to e-mail at: [email or web address]  Will anyone else be joining your video visit? No      Video Start Time: 9:08 AM  Video-Visit Details    Type of service:  Video Visit    Video End Time:9:24 AM    Originating Location (pt. Location): Home    Distant Location (provider location):  [facility] [place]     Platform used for Video Visit: [employer] for video and Grokker for audio                Chief Complaint:   Nocturia          History of Present Illness:   Manfred Young is a 51 year old male who presents for evaluation of nocturia.  He reports nocturia 3-4 times per night.  He denies any significant daytime frequency.  He does report some urgency with his nocturia, but denies any incontinence.  He feels his urinary stream is slower than it has been previously.  He feels that he is fully emptying his bladder.  He denies any dysuria or hematuria.  He denies any lower extremity edema.  He denies any history of FELY and does not snore at night that he is aware of.  He is currently limiting his evening fluids after dinner.  He has tried oxybutynin and trospium which were ineffective, and was most recently placed on Flomax which hasn't improved his nocturia either.      He does have a history of one kidney stone 10 years ago and he was able to pass it on his own.     Most recent PSA normal at 1.9         Past Medical History:     Past Medical History:   Diagnosis Date     NO ACTIVE  PROBLEMS             Past Surgical History:     Past Surgical History:   Procedure Laterality Date     SURGICAL HISTORY OF -       detached retina     SURGICAL HISTORY OF -       arthroscopic knee surgery-Right x1, left x1, plica repair            Medications     Current Outpatient Medications   Medication     tadalafil (CIALIS) 5 MG tablet     tamsulosin (FLOMAX) 0.4 MG capsule     ALEVE 220 MG OR CAPS     ibuprofen (IBUPROFEN) 200 MG tablet     No current facility-administered medications for this visit.            Family History:     Family History   Problem Relation Age of Onset     Cerebrovascular Disease Maternal Grandmother      Osteoporosis Mother      Lipids Father             Social History:     Social History     Socioeconomic History     Marital status:      Spouse name: Not on file     Number of children: Not on file     Years of education: Not on file     Highest education level: Not on file   Occupational History     Occupation: Build furniture     Employer: SELF   Tobacco Use     Smoking status: Never Smoker     Smokeless tobacco: Never Used   Substance and Sexual Activity     Alcohol use: Yes     Drug use: No     Sexual activity: Yes     Partners: Female   Other Topics Concern     Parent/sibling w/ CABG, MI or angioplasty before 65F 55M? Not Asked   Social History Narrative     Not on file     Social Determinants of Health     Financial Resource Strain: Not on file   Food Insecurity: Not on file   Transportation Needs: Not on file   Physical Activity: Not on file   Stress: Not on file   Social Connections: Not on file   Intimate Partner Violence: Not on file   Housing Stability: Not on file            Allergies:   Bee venom, Chloraprep one step, Penicillins, and Wasp venom protein         Review of Systems:  From intake questionnaire   Negative 14 system review except as noted on HPI, nurse's note.         Physical Exam:   Patient is a 51 year old  male    General Appearance Adult: Alert, no  acute distress, oriented  Lungs: no respiratory distress, or pursed lip breathing  Heart: No obvious jugular venous distension present  Skin: no suspicious lesions or rashes  Neuro: Alert, oriented, speech and mentation normal  Further examination is deferred due to the nature of our visit.        Labs and Pathology:    I personally reviewed all applicable laboratory data and went over findings with patient  Significant for:    CBC RESULTS:  Recent Labs   Lab Test 04/12/21  0804 02/02/21  0747   WBC 4.5  --    HGB 15.2 15.6     --         BMP RESULTS:  Recent Labs   Lab Test 04/12/21  0804 02/02/21  0747   *  --    POTASSIUM 4.3  --    CHLORIDE 114*  --    CO2 28  --    ANIONGAP 3  --    GLC 98 96   BUN 12  --    CR 0.96 0.97   GFRESTIMATED >90 >90   GFRESTBLACK >90 >90   ROSENDO 8.6  --        UA RESULTS:   Recent Labs   Lab Test 04/12/21  0810 02/02/21  0756   SG 1.016 1.017   URINEPH 6.0 5.0   NITRITE Negative Negative   RBCU <1 1   WBCU 1 1       PSA RESULTS  Prostatic Specific Antigen (Screen) (9/3/2021)  Specimen:  Blood   Ref Range & Units 4 mo ago   Prostatic Specific Antigen 0.0 - 4.0 ng/mL 1.9          Imaging:    I personally reviewed all applicable imaging and went over findings with patient.  Significant for:    Results for orders placed or performed in visit on 04/16/21   CTA RENAL WITH CONTRAST    Narrative    CTA RENAL DONOR WITH 3D AND MULTIPLANAR RECONSTRUCTIONS 4/16/2021 1:29  PM    CLINICAL HISTORY: Potential renal donor evaluation.    COMPARISONS: CT pelvis 7/28/2010, CT abdomen and pelvis 6/28/2010    REFERRING PROVIDER: NATANAEL PHILLIPS    TECHNIQUE: Unenhanced CT performed through the abdomen and pelvis.  Following the uneventful demonstration of intravenous contrast, CTA  was performed through the abdomen and pelvis. In the venous phase, CT  was performed through the abdomen. After 5 minute delay, CT was  repeated through the abdomen and pelvis. Coronal and sagittal  reconstructions  were produced. 3-D and multiplanar reconstructions  were produced.    DOSE (DLP): 2082 mGy*cm    CONTRAST: 100 mL Isovue 370    FINDINGS:     Right kidney:    1. Right kidney measures: 12.3 cm.  2. Renal arteries: 2, in renal artery 1, 5.9 cm from ostium to first  branch, in the second (more inferior renal artery), 4.1 cm from ostium  to first branch.   3. Renal veins: 2  4. Ureters: 1  5. Renal parenchyma: Punctate calcifications in the inferior pole  (series 3 image 45).  6. Renal volume: 204 cc.    Left kidney:    1. Left kidney measures: 12.2 cm.  2. Renal arteries: 1, 2.0 cm from ostium to first branch.  3. Renal veins: 1 retroaortic  4. Ureters: 1  5. Renal parenchyma: Punctate calcification in the inferior pole  (series 3 image 42).  6. Renal volume: 200 cc.    The aorta and iliac arteries are normal in appearance. The origins of  the celiac artery, the superior mesenteric artery, and inferior  mesenteric artery are patent and the arteries are normal in  appearance. Variant anatomy with the accessory right hepatic artery  and left gastric artery originating off the aorta directly.    The splenic vein, portal vein, hepatic veins, inferior vena cava, and  iliac veins are patent and normal in appearance.    Cholelithiasis without evidence of acute cholecystitis. The liver,  spleen, gallbladder, pancreas, and adrenal glands are otherwise normal  in appearance.     Lung bases are clear.    No destructive lesions seen in the bones.       Impression    IMPRESSION:   1. RIGHT KIDNEY:       A. The right kidney contains 2 arteries, 2 veins, and 1 ureter.       B. The right kidney parenchyma demonstrates a punctate  calcification in the inferior pole, otherwise normal.        C. The renal volume is 204 cc.    2. LEFT KIDNEY:       A. The left kidney contains 1 artery, 1 retroaortic vein, and 1  ureter.       B. The left kidney parenchyma is demonstrates a punctate  calcification in the inferior pole, otherwise normal.         C. The renal volume is 200 cc.    I have personally reviewed the examination and initial interpretation  and I agree with the findings.    DILCIA DE LEON              Assessment and Plan:     Assessment: 51 year old male with isolated symptoms of nocturia 3-4 times per night, with slowed urinary stream.  Patient has tried oxybutynin and trospium in the past with no improvement in symptoms, and was most recently started on Flomax with no change.  He is limiting fluids before bedtime, and denies any LE edema or history of FELY.  Patient previously saw Dr. Anderson with [facility] urology on 11/16/2021 at which time PVR was reassuringly low at 28 ml.  We discussed further evaluation with uroflow to rule out signs of obstruction given his slowed urinary stream.  If uroflow is suggestive of obstruction, then we will proceed with cystoscopic evaluation.  We also discussed trialing a beta3 agonist in the interim, with evening dosing to see if this will help with his nocturia, and patient wishes to trial this medication.  We reviewed benefits and adverse effects; patient without a history of hypertension and no signs of retention on recent PVR from two months ago.  We also discussed possible etiology of undiagnosed FELY contributing to nocturia, though he denies any significant risk factors for FELY at this time.  If uroflow results are normal and Myrbetriq is ineffective, could consider UDS for further evaluation.      Plan:  - Schedule nurse visit for uroflow and PVR.   - Start Myrbetriq 25 mg once daily in the evening.   - Schedule follow up visit with me in 2 months for recheck.       JADA Panda  Department of Urology

## 2022-01-28 NOTE — CONFIDENTIAL NOTE
Reason for visit: consult enlarged prostate - per pt     Relevant information: OAB    Records/imaging/labs/orders: in epic    Pt called: n/a    At Rooming: virtual

## 2022-01-28 NOTE — PATIENT INSTRUCTIONS
UROLOGY CLINIC VISIT PATIENT INSTRUCTIONS    - Schedule nurse visit for uroflow and PVR.   - Start Myrbetriq 25 mg once daily in the evening.   - Schedule follow up visit with me in 2 months for recheck.     If you have any issues, questions or concerns in the meantime, do not hesitate to contact us at [phone] or via [employer].     It was a pleasure meeting with you today.  Thank you for allowing me and my team the privilege of caring for you today.  YOU are the reason we are here, and I truly hope we provided you with the excellent service you deserve.  Please let us know if there is anything else we can do for you so that we can be sure you are leaving completely satisfied with your care experience.    Emi Glover PA-C  Department of Urology

## 2022-01-28 NOTE — PROGRESS NOTES
Chief Complaint:   Nocturia          History of Present Illness:   Manfred Young is a 51 year old male who presents for evaluation of nocturia.  He reports nocturia 3-4 times per night.  He denies any significant daytime frequency.  He does report some urgency with his nocturia, but denies any incontinence.  He feels his urinary stream is slower than it has been previously.  He feels that he is fully emptying his bladder.  He denies any dysuria or hematuria.  He denies any lower extremity edema.  He denies any history of FELY and does not snore at night that he is aware of.  He is currently limiting his evening fluids after dinner.  He has tried oxybutynin and trospium which were ineffective, and was most recently placed on Flomax which hasn't improved his nocturia either.      He does have a history of one kidney stone 10 years ago and he was able to pass it on his own.     Most recent PSA normal at 1.9         Past Medical History:     Past Medical History:   Diagnosis Date     NO ACTIVE PROBLEMS             Past Surgical History:     Past Surgical History:   Procedure Laterality Date     SURGICAL HISTORY OF -       detached retina     SURGICAL HISTORY OF -       arthroscopic knee surgery-Right x1, left x1, plica repair            Medications     Current Outpatient Medications   Medication     tadalafil (CIALIS) 5 MG tablet     tamsulosin (FLOMAX) 0.4 MG capsule     ALEVE 220 MG OR CAPS     ibuprofen (IBUPROFEN) 200 MG tablet     No current facility-administered medications for this visit.            Family History:     Family History   Problem Relation Age of Onset     Cerebrovascular Disease Maternal Grandmother      Osteoporosis Mother      Lipids Father             Social History:     Social History     Socioeconomic History     Marital status:      Spouse name: Not on file     Number of children: Not on file     Years of education: Not on file     Highest education level: Not on file    Occupational History     Occupation: Build furniture     Employer: SELF   Tobacco Use     Smoking status: Never Smoker     Smokeless tobacco: Never Used   Substance and Sexual Activity     Alcohol use: Yes     Drug use: No     Sexual activity: Yes     Partners: Female   Other Topics Concern     Parent/sibling w/ CABG, MI or angioplasty before 65F 55M? Not Asked   Social History Narrative     Not on file     Social Determinants of Health     Financial Resource Strain: Not on file   Food Insecurity: Not on file   Transportation Needs: Not on file   Physical Activity: Not on file   Stress: Not on file   Social Connections: Not on file   Intimate Partner Violence: Not on file   Housing Stability: Not on file            Allergies:   Bee venom, Chloraprep one step, Penicillins, and Wasp venom protein         Review of Systems:  From intake questionnaire   Negative 14 system review except as noted on HPI, nurse's note.         Physical Exam:   Patient is a 51 year old  male    General Appearance Adult: Alert, no acute distress, oriented  Lungs: no respiratory distress, or pursed lip breathing  Heart: No obvious jugular venous distension present  Skin: no suspicious lesions or rashes  Neuro: Alert, oriented, speech and mentation normal  Further examination is deferred due to the nature of our visit.        Labs and Pathology:    I personally reviewed all applicable laboratory data and went over findings with patient  Significant for:    CBC RESULTS:  Recent Labs   Lab Test 04/12/21  0804 02/02/21  0747   WBC 4.5  --    HGB 15.2 15.6     --         BMP RESULTS:  Recent Labs   Lab Test 04/12/21  0804 02/02/21  0747   *  --    POTASSIUM 4.3  --    CHLORIDE 114*  --    CO2 28  --    ANIONGAP 3  --    GLC 98 96   BUN 12  --    CR 0.96 0.97   GFRESTIMATED >90 >90   GFRESTBLACK >90 >90   ROSENDO 8.6  --        UA RESULTS:   Recent Labs   Lab Test 04/12/21  0810 02/02/21  0756   SG 1.016 1.017   URINEPH 6.0 5.0   NITRITE  Negative Negative   RBCU <1 1   WBCU 1 1       PSA RESULTS  Prostatic Specific Antigen (Screen) (9/3/2021)  Specimen:  Blood   Ref Range & Units 4 mo ago   Prostatic Specific Antigen 0.0 - 4.0 ng/mL 1.9          Imaging:    I personally reviewed all applicable imaging and went over findings with patient.  Significant for:    Results for orders placed or performed in visit on 04/16/21   CTA RENAL WITH CONTRAST    Narrative    CTA RENAL DONOR WITH 3D AND MULTIPLANAR RECONSTRUCTIONS 4/16/2021 1:29  PM    CLINICAL HISTORY: Potential renal donor evaluation.    COMPARISONS: CT pelvis 7/28/2010, CT abdomen and pelvis 6/28/2010    REFERRING PROVIDER: NATANAEL PHILLIPS    TECHNIQUE: Unenhanced CT performed through the abdomen and pelvis.  Following the uneventful demonstration of intravenous contrast, CTA  was performed through the abdomen and pelvis. In the venous phase, CT  was performed through the abdomen. After 5 minute delay, CT was  repeated through the abdomen and pelvis. Coronal and sagittal  reconstructions were produced. 3-D and multiplanar reconstructions  were produced.    DOSE (DLP): 2082 mGy*cm    CONTRAST: 100 mL Isovue 370    FINDINGS:     Right kidney:    1. Right kidney measures: 12.3 cm.  2. Renal arteries: 2, in renal artery 1, 5.9 cm from ostium to first  branch, in the second (more inferior renal artery), 4.1 cm from ostium  to first branch.   3. Renal veins: 2  4. Ureters: 1  5. Renal parenchyma: Punctate calcifications in the inferior pole  (series 3 image 45).  6. Renal volume: 204 cc.    Left kidney:    1. Left kidney measures: 12.2 cm.  2. Renal arteries: 1, 2.0 cm from ostium to first branch.  3. Renal veins: 1 retroaortic  4. Ureters: 1  5. Renal parenchyma: Punctate calcification in the inferior pole  (series 3 image 42).  6. Renal volume: 200 cc.    The aorta and iliac arteries are normal in appearance. The origins of  the celiac artery, the superior mesenteric artery, and inferior  mesenteric  artery are patent and the arteries are normal in  appearance. Variant anatomy with the accessory right hepatic artery  and left gastric artery originating off the aorta directly.    The splenic vein, portal vein, hepatic veins, inferior vena cava, and  iliac veins are patent and normal in appearance.    Cholelithiasis without evidence of acute cholecystitis. The liver,  spleen, gallbladder, pancreas, and adrenal glands are otherwise normal  in appearance.     Lung bases are clear.    No destructive lesions seen in the bones.       Impression    IMPRESSION:   1. RIGHT KIDNEY:       A. The right kidney contains 2 arteries, 2 veins, and 1 ureter.       B. The right kidney parenchyma demonstrates a punctate  calcification in the inferior pole, otherwise normal.        C. The renal volume is 204 cc.    2. LEFT KIDNEY:       A. The left kidney contains 1 artery, 1 retroaortic vein, and 1  ureter.       B. The left kidney parenchyma is demonstrates a punctate  calcification in the inferior pole, otherwise normal.        C. The renal volume is 200 cc.    I have personally reviewed the examination and initial interpretation  and I agree with the findings.    DILCIA DE LEON              Assessment and Plan:     Assessment: 51 year old male with isolated symptoms of nocturia 3-4 times per night, with slowed urinary stream.  Patient has tried oxybutynin and trospium in the past with no improvement in symptoms, and was most recently started on Flomax with no change.  He is limiting fluids before bedtime, and denies any LE edema or history of FELY.  Patient previously saw Dr. Anderson with [facility] urology on 11/16/2021 at which time PVR was reassuringly low at 28 ml.  We discussed further evaluation with uroflow to rule out signs of obstruction given his slowed urinary stream.  If uroflow is suggestive of obstruction, then we will proceed with cystoscopic evaluation.  We also discussed trialing a beta3 agonist in the  interim, with evening dosing to see if this will help with his nocturia, and patient wishes to trial this medication.  We reviewed benefits and adverse effects; patient without a history of hypertension and no signs of retention on recent PVR from two months ago.  We also discussed possible etiology of undiagnosed FELY contributing to nocturia, though he denies any significant risk factors for FELY at this time.  If uroflow results are normal and Myrbetriq is ineffective, could consider UDS for further evaluation.      Plan:  - Schedule nurse visit for uroflow and PVR.   - Start Myrbetriq 25 mg once daily in the evening.   - Schedule follow up visit with me in 2 months for recheck.       JADA Panda  Department of Urology

## 2022-02-04 ENCOUNTER — OFFICE VISIT (OUTPATIENT)
Dept: UROLOGY | Facility: CLINIC | Age: 52
End: 2022-02-04
Payer: COMMERCIAL

## 2022-02-04 DIAGNOSIS — R35.1 NOCTURIA: Primary | ICD-10-CM

## 2022-02-04 PROCEDURE — 51741 ELECTRO-UROFLOWMETRY FIRST: CPT

## 2022-02-04 PROCEDURE — 51798 US URINE CAPACITY MEASURE: CPT

## 2022-02-04 NOTE — PROGRESS NOTES
Manfred Ze Young comes into clinic today at the request of Emi Glover PA-C with the diagnosis of nocturia for a PVR/Flow (uroflow).    Urinary Flow Rate  Peak Flow: 3.1 mL/s  Average Flow: 1.6 mL/s  Voided (mL): 30 mL  Residual Volume by Ultrasound: 30 mL  Character of Curve: Can not be characterized     Ordering provider notified via inbasket.    This service provided today was under the supervising provider of the day Dr. Mina Hernandez, who was available if needed.    Angie Christensen, CMA

## 2022-03-14 ENCOUNTER — PRE VISIT (OUTPATIENT)
Dept: UROLOGY | Facility: CLINIC | Age: 52
End: 2022-03-14
Payer: COMMERCIAL

## 2022-03-14 NOTE — CONFIDENTIAL NOTE
Reason for visit: follow-up symptom recheck     Relevant information: nocturia    Records/imaging/labs/orders: in epic    Pt called: n/a    At Rooming: virtual

## 2022-03-31 ENCOUNTER — VIRTUAL VISIT (OUTPATIENT)
Dept: UROLOGY | Facility: CLINIC | Age: 52
End: 2022-03-31
Payer: COMMERCIAL

## 2022-03-31 DIAGNOSIS — R35.1 NOCTURIA: ICD-10-CM

## 2022-03-31 PROCEDURE — 99214 OFFICE O/P EST MOD 30 MIN: CPT | Mod: 95 | Performed by: PHYSICIAN ASSISTANT

## 2022-03-31 RX ORDER — TOLTERODINE 2 MG/1
4 CAPSULE, EXTENDED RELEASE ORAL DAILY
Qty: 30 CAPSULE | Refills: 3
Start: 2022-03-31 | End: 2022-12-20

## 2022-03-31 NOTE — PROGRESS NOTES
Manfred is a 51 year old who is being evaluated via a billable video visit.      How would you like to obtain your AVS? [email or web address]  If the video visit is dropped, the invitation should be resent by: Text to cell phone: [phone]  Will anyone else be joining your video visit? No      Video Start Time: 8:38 AM  Video-Visit Details    Type of service:  Video Visit    Video End Time:8:48 AM    Originating Location (pt. Location): Home    Distant Location (provider location):  [facility]     Platform used for Video Visit: [employer]

## 2022-03-31 NOTE — LETTER
3/31/2022       RE: Manfred Young  [address]  [facility] [address]     Dear Colleague,    Thank you for referring your patient, Manfred Young, to the [facility] [place] at [facility]. Please see a copy of my visit note below.    Manfred is a 51 year old who is being evaluated via a billable video visit.      How would you like to obtain your AVS? MyChart  If the video visit is dropped, the invitation should be resent by: Text to cell phone: [phone]  Will anyone else be joining your video visit? No      Video Start Time: 8:38 AM  Video-Visit Details    Type of service:  Video Visit    Video End Time:8:48 AM    Originating Location (pt. Location): Home    Distant Location (provider location):  [facility] [place]     Platform used for Video Visit: [employer]          Chief Complaint:   Follow up          History of Present Illness:   Manfred Young is a 51 year old male who presents for follow up of nocturia.  He was previously seen 1/28/2022 at which time he reported nocturia 3-4 times per night.  He denies any significant daytime frequency.  He does report some urgency with his nocturia, but denies any incontinence.  He feels his urinary stream is slower than it has been previously.  He feels that he is fully emptying his bladder.  He denies any dysuria or hematuria.  He denies any lower extremity edema.  He denies any history of FELY and does not snore at night that he is aware of.  He is currently limiting his evening fluids after dinner.  He has tried oxybutynin and trospium which were ineffective, and was most recently placed on Flomax which hasn't improved his nocturia either.  The shared decision was made to initiate Myrbetriq for his symptoms, though due to insurance coverage he was subsequently initiated on Detrol LA 2 mg daily.  Follow up nurse visit from 2/4/2022 showing PVR 30 ml; patient  did not void enough volume to validate a uroflow.       Today, the patient reports the Detrol is helping a little bit.  He reports the nocturia has improved to 1-2 times, rather than 3-4 times previously.  He reports when he is voiding overnight he is able to urinate a larger volume.  He feels that he is fully emptying his bladder adequately.  He feels his urinary stream is slightly slower, but overall is fairly normal.  No daytime frequency.  He denies any urgency or incontinence.  No alcohol use.  No caffeine use.  He denies any side effects from the Detrol, such as dry mouth or eyes or constipation, but does report some increased headaches recently, though attributes this to family and life stresses; he reports a history of headaches.           Past Medical History:     Past Medical History:   Diagnosis Date     NO ACTIVE PROBLEMS             Past Surgical History:     Past Surgical History:   Procedure Laterality Date     SURGICAL HISTORY OF -       detached retina     SURGICAL HISTORY OF -       arthroscopic knee surgery-Right x1, left x1, plica repair            Medications     Current Outpatient Medications   Medication     ALEVE 220 MG OR CAPS     ibuprofen (IBUPROFEN) 200 MG tablet     tadalafil (CIALIS) 5 MG tablet     tolterodine ER (DETROL LA) 2 MG 24 hr capsule     No current facility-administered medications for this visit.            Family History:     Family History   Problem Relation Age of Onset     Cerebrovascular Disease Maternal Grandmother      Osteoporosis Mother      Lipids Father             Social History:     Social History     Socioeconomic History     Marital status:      Spouse name: Not on file     Number of children: Not on file     Years of education: Not on file     Highest education level: Not on file   Occupational History     Occupation: Build furniture     Employer: SELF   Tobacco Use     Smoking status: Never Smoker     Smokeless tobacco: Never Used   Substance and  Sexual Activity     Alcohol use: Yes     Drug use: No     Sexual activity: Yes     Partners: Female   Other Topics Concern     Parent/sibling w/ CABG, MI or angioplasty before 65F 55M? Not Asked   Social History Narrative     Not on file     Social Determinants of Health     Financial Resource Strain: Not on file   Food Insecurity: Not on file   Transportation Needs: Not on file   Physical Activity: Not on file   Stress: Not on file   Social Connections: Not on file   Intimate Partner Violence: Not on file   Housing Stability: Not on file            Allergies:   Bee venom, Chloraprep one step, Penicillins, and Wasp venom protein         Review of Systems:  From intake questionnaire   Negative 14 system review except as noted on HPI, nurse's note.         Physical Exam:   Patient is a 51 year old  male    General Appearance Adult: Alert, no acute distress, oriented  Lungs: no respiratory distress, or pursed lip breathing  Heart: No obvious jugular venous distension present  Skin: no suspicious lesions or rashes  Neuro: Alert, oriented, speech and mentation normal  Further examination is deferred due to the nature of our visit.        Labs and Pathology:    I personally reviewed all applicable laboratory data and went over findings with patient  Significant for:    CBC RESULTS:  Recent Labs   Lab Test 04/12/21  0804 02/02/21  0747   WBC 4.5  --    HGB 15.2 15.6     --         BMP RESULTS:  Recent Labs   Lab Test 04/12/21  0804 02/02/21  0747   *  --    POTASSIUM 4.3  --    CHLORIDE 114*  --    CO2 28  --    ANIONGAP 3  --    GLC 98 96   BUN 12  --    CR 0.96 0.97   GFRESTIMATED >90 >90   GFRESTBLACK >90 >90   ROSENDO 8.6  --        UA RESULTS:   Recent Labs   Lab Test 04/12/21  0810 02/02/21  0756   SG 1.016 1.017   URINEPH 6.0 5.0   NITRITE Negative Negative   RBCU <1 1   WBCU 1 1       PSA RESULTS  No results found for: PSA      Imaging:    I personally reviewed all applicable imaging and went over findings  with patient.  Significant for:    Results for orders placed or performed in visit on 04/16/21   CTA RENAL WITH CONTRAST    Narrative    CTA RENAL DONOR WITH 3D AND MULTIPLANAR RECONSTRUCTIONS 4/16/2021 1:29  PM    CLINICAL HISTORY: Potential renal donor evaluation.    COMPARISONS: CT pelvis 7/28/2010, CT abdomen and pelvis 6/28/2010    REFERRING PROVIDER: NATANAEL PHILLIPS    TECHNIQUE: Unenhanced CT performed through the abdomen and pelvis.  Following the uneventful demonstration of intravenous contrast, CTA  was performed through the abdomen and pelvis. In the venous phase, CT  was performed through the abdomen. After 5 minute delay, CT was  repeated through the abdomen and pelvis. Coronal and sagittal  reconstructions were produced. 3-D and multiplanar reconstructions  were produced.    DOSE (DLP): 2082 mGy*cm    CONTRAST: 100 mL Isovue 370    FINDINGS:     Right kidney:    1. Right kidney measures: 12.3 cm.  2. Renal arteries: 2, in renal artery 1, 5.9 cm from ostium to first  branch, in the second (more inferior renal artery), 4.1 cm from ostium  to first branch.   3. Renal veins: 2  4. Ureters: 1  5. Renal parenchyma: Punctate calcifications in the inferior pole  (series 3 image 45).  6. Renal volume: 204 cc.    Left kidney:    1. Left kidney measures: 12.2 cm.  2. Renal arteries: 1, 2.0 cm from ostium to first branch.  3. Renal veins: 1 retroaortic  4. Ureters: 1  5. Renal parenchyma: Punctate calcification in the inferior pole  (series 3 image 42).  6. Renal volume: 200 cc.    The aorta and iliac arteries are normal in appearance. The origins of  the celiac artery, the superior mesenteric artery, and inferior  mesenteric artery are patent and the arteries are normal in  appearance. Variant anatomy with the accessory right hepatic artery  and left gastric artery originating off the aorta directly.    The splenic vein, portal vein, hepatic veins, inferior vena cava, and  iliac veins are patent and normal in  appearance.    Cholelithiasis without evidence of acute cholecystitis. The liver,  spleen, gallbladder, pancreas, and adrenal glands are otherwise normal  in appearance.     Lung bases are clear.    No destructive lesions seen in the bones.       Impression    IMPRESSION:   1. RIGHT KIDNEY:       A. The right kidney contains 2 arteries, 2 veins, and 1 ureter.       B. The right kidney parenchyma demonstrates a punctate  calcification in the inferior pole, otherwise normal.        C. The renal volume is 204 cc.    2. LEFT KIDNEY:       A. The left kidney contains 1 artery, 1 retroaortic vein, and 1  ureter.       B. The left kidney parenchyma is demonstrates a punctate  calcification in the inferior pole, otherwise normal.        C. The renal volume is 200 cc.    I have personally reviewed the examination and initial interpretation  and I agree with the findings.    DILCIA DE LEON              Assessment and Plan:     Assessment: 51 year old male with isolated symptoms of nocturia 3-4 times per night, improved to 1-2 times per night since initiating Detrol LA 2 mg once daily.  Recent PVR from 2/4/2022 reassuringly low at 30 ml.  Patient reports he is emptying his bladder better and denies any significantly slowed urinary stream.  He feels his nocturia is improved but is hoping for slightly more improvement in symptoms.  We discussed options for continuing to limit fluids in the evening, increasing the Detrol to 4 mg once daily, or switching to an alternative anticholinergic.  Patient does report some increased headaches recently; question if this may be a side effect of the Detrol, but the patient endorses significant life stresses at this time which he believes are causing his headaches, which he has had before.  The shared decision was made to cautiously trial an increased dose of the Detrol to 4 mg daily, but he was strongly advised that if the headaches worsen on the increased dose of the Detrol he should  decrease the dose and notify me immediately.  Would then consider switching to alternative anticholinergic.      Plan:  - Increase your Detrol LA from 2 mg once daily (1 tablet) to 4 mg once daily (2 tablets).   - If you develop worsening headaches, decrease back to 1 tablet and notify me.   - Follow up in 1-2 months for recheck.       JADA Panda  Department of Urology

## 2022-03-31 NOTE — PATIENT INSTRUCTIONS
UROLOGY CLINIC VISIT PATIENT INSTRUCTIONS    - Increase your Detrol LA from 2 mg once daily (1 tablet) to 4 mg once daily (2 tablets).   - If you develop worsening headaches, decrease back to 1 tablet and notify me.   - Follow up in 1-2 months for recheck.     If you have any issues, questions or concerns in the meantime, do not hesitate to contact us at [phone] or via [employer].     It was a pleasure meeting with you today.  Thank you for allowing me and my team the privilege of caring for you today.  YOU are the reason we are here, and I truly hope we provided you with the excellent service you deserve.  Please let us know if there is anything else we can do for you so that we can be sure you are leaving completely satisfied with your care experience.    Emi Glover PA-C  Department of Urology

## 2022-03-31 NOTE — PROGRESS NOTES
Chief Complaint:   Follow up          History of Present Illness:   Manfred Yougn is a 51 year old male who presents for follow up of nocturia.  He was previously seen 1/28/2022 at which time he reported nocturia 3-4 times per night.  He denies any significant daytime frequency.  He does report some urgency with his nocturia, but denies any incontinence.  He feels his urinary stream is slower than it has been previously.  He feels that he is fully emptying his bladder.  He denies any dysuria or hematuria.  He denies any lower extremity edema.  He denies any history of FELY and does not snore at night that he is aware of.  He is currently limiting his evening fluids after dinner.  He has tried oxybutynin and trospium which were ineffective, and was most recently placed on Flomax which hasn't improved his nocturia either.  The shared decision was made to initiate Myrbetriq for his symptoms, though due to insurance coverage he was subsequently initiated on Detrol LA 2 mg daily.  Follow up nurse visit from 2/4/2022 showing PVR 30 ml; patient did not void enough volume to validate a uroflow.       Today, the patient reports the Detrol is helping a little bit.  He reports the nocturia has improved to 1-2 times, rather than 3-4 times previously.  He reports when he is voiding overnight he is able to urinate a larger volume.  He feels that he is fully emptying his bladder adequately.  He feels his urinary stream is slightly slower, but overall is fairly normal.  No daytime frequency.  He denies any urgency or incontinence.  No alcohol use.  No caffeine use.  He denies any side effects from the Detrol, such as dry mouth or eyes or constipation, but does report some increased headaches recently, though attributes this to family and life stresses; he reports a history of headaches.           Past Medical History:     Past Medical History:   Diagnosis Date     NO ACTIVE PROBLEMS             Past Surgical History:      Past Surgical History:   Procedure Laterality Date     SURGICAL HISTORY OF -       detached retina     SURGICAL HISTORY OF -       arthroscopic knee surgery-Right x1, left x1, plica repair            Medications     Current Outpatient Medications   Medication     ALEVE 220 MG OR CAPS     ibuprofen (IBUPROFEN) 200 MG tablet     tadalafil (CIALIS) 5 MG tablet     tolterodine ER (DETROL LA) 2 MG 24 hr capsule     No current facility-administered medications for this visit.            Family History:     Family History   Problem Relation Age of Onset     Cerebrovascular Disease Maternal Grandmother      Osteoporosis Mother      Lipids Father             Social History:     Social History     Socioeconomic History     Marital status:      Spouse name: Not on file     Number of children: Not on file     Years of education: Not on file     Highest education level: Not on file   Occupational History     Occupation: Build furniture     Employer: SELF   Tobacco Use     Smoking status: Never Smoker     Smokeless tobacco: Never Used   Substance and Sexual Activity     Alcohol use: Yes     Drug use: No     Sexual activity: Yes     Partners: Female   Other Topics Concern     Parent/sibling w/ CABG, MI or angioplasty before 65F 55M? Not Asked   Social History Narrative     Not on file     Social Determinants of Health     Financial Resource Strain: Not on file   Food Insecurity: Not on file   Transportation Needs: Not on file   Physical Activity: Not on file   Stress: Not on file   Social Connections: Not on file   Intimate Partner Violence: Not on file   Housing Stability: Not on file            Allergies:   Bee venom, Chloraprep one step, Penicillins, and Wasp venom protein         Review of Systems:  From intake questionnaire   Negative 14 system review except as noted on HPI, nurse's note.         Physical Exam:   Patient is a 51 year old  male    General Appearance Adult: Alert, no acute distress, oriented  Lungs:  no respiratory distress, or pursed lip breathing  Heart: No obvious jugular venous distension present  Skin: no suspicious lesions or rashes  Neuro: Alert, oriented, speech and mentation normal  Further examination is deferred due to the nature of our visit.        Labs and Pathology:    I personally reviewed all applicable laboratory data and went over findings with patient  Significant for:    CBC RESULTS:  Recent Labs   Lab Test 04/12/21  0804 02/02/21  0747   WBC 4.5  --    HGB 15.2 15.6     --         BMP RESULTS:  Recent Labs   Lab Test 04/12/21  0804 02/02/21  0747   *  --    POTASSIUM 4.3  --    CHLORIDE 114*  --    CO2 28  --    ANIONGAP 3  --    GLC 98 96   BUN 12  --    CR 0.96 0.97   GFRESTIMATED >90 >90   GFRESTBLACK >90 >90   ROSENDO 8.6  --        UA RESULTS:   Recent Labs   Lab Test 04/12/21  0810 02/02/21  0756   SG 1.016 1.017   URINEPH 6.0 5.0   NITRITE Negative Negative   RBCU <1 1   WBCU 1 1       PSA RESULTS  No results found for: PSA      Imaging:    I personally reviewed all applicable imaging and went over findings with patient.  Significant for:    Results for orders placed or performed in visit on 04/16/21   CTA RENAL WITH CONTRAST    Narrative    CTA RENAL DONOR WITH 3D AND MULTIPLANAR RECONSTRUCTIONS 4/16/2021 1:29  PM    CLINICAL HISTORY: Potential renal donor evaluation.    COMPARISONS: CT pelvis 7/28/2010, CT abdomen and pelvis 6/28/2010    REFERRING PROVIDER: NATANAEL PHILLIPS    TECHNIQUE: Unenhanced CT performed through the abdomen and pelvis.  Following the uneventful demonstration of intravenous contrast, CTA  was performed through the abdomen and pelvis. In the venous phase, CT  was performed through the abdomen. After 5 minute delay, CT was  repeated through the abdomen and pelvis. Coronal and sagittal  reconstructions were produced. 3-D and multiplanar reconstructions  were produced.    DOSE (DLP): 2082 mGy*cm    CONTRAST: 100 mL Isovue 370    FINDINGS:     Right  kidney:    1. Right kidney measures: 12.3 cm.  2. Renal arteries: 2, in renal artery 1, 5.9 cm from ostium to first  branch, in the second (more inferior renal artery), 4.1 cm from ostium  to first branch.   3. Renal veins: 2  4. Ureters: 1  5. Renal parenchyma: Punctate calcifications in the inferior pole  (series 3 image 45).  6. Renal volume: 204 cc.    Left kidney:    1. Left kidney measures: 12.2 cm.  2. Renal arteries: 1, 2.0 cm from ostium to first branch.  3. Renal veins: 1 retroaortic  4. Ureters: 1  5. Renal parenchyma: Punctate calcification in the inferior pole  (series 3 image 42).  6. Renal volume: 200 cc.    The aorta and iliac arteries are normal in appearance. The origins of  the celiac artery, the superior mesenteric artery, and inferior  mesenteric artery are patent and the arteries are normal in  appearance. Variant anatomy with the accessory right hepatic artery  and left gastric artery originating off the aorta directly.    The splenic vein, portal vein, hepatic veins, inferior vena cava, and  iliac veins are patent and normal in appearance.    Cholelithiasis without evidence of acute cholecystitis. The liver,  spleen, gallbladder, pancreas, and adrenal glands are otherwise normal  in appearance.     Lung bases are clear.    No destructive lesions seen in the bones.       Impression    IMPRESSION:   1. RIGHT KIDNEY:       A. The right kidney contains 2 arteries, 2 veins, and 1 ureter.       B. The right kidney parenchyma demonstrates a punctate  calcification in the inferior pole, otherwise normal.        C. The renal volume is 204 cc.    2. LEFT KIDNEY:       A. The left kidney contains 1 artery, 1 retroaortic vein, and 1  ureter.       B. The left kidney parenchyma is demonstrates a punctate  calcification in the inferior pole, otherwise normal.        C. The renal volume is 200 cc.    I have personally reviewed the examination and initial interpretation  and I agree with the  findings.    DILCIA ELLIOTTA              Assessment and Plan:     Assessment: 51 year old male with isolated symptoms of nocturia 3-4 times per night, improved to 1-2 times per night since initiating Detrol LA 2 mg once daily.  Recent PVR from 2/4/2022 reassuringly low at 30 ml.  Patient reports he is emptying his bladder better and denies any significantly slowed urinary stream.  He feels his nocturia is improved but is hoping for slightly more improvement in symptoms.  We discussed options for continuing to limit fluids in the evening, increasing the Detrol to 4 mg once daily, or switching to an alternative anticholinergic.  Patient does report some increased headaches recently; question if this may be a side effect of the Detrol, but the patient endorses significant life stresses at this time which he believes are causing his headaches, which he has had before.  The shared decision was made to cautiously trial an increased dose of the Detrol to 4 mg daily, but he was strongly advised that if the headaches worsen on the increased dose of the Detrol he should decrease the dose and notify me immediately.  Would then consider switching to alternative anticholinergic.      Plan:  - Increase your Detrol LA from 2 mg once daily (1 tablet) to 4 mg once daily (2 tablets).   - If you develop worsening headaches, decrease back to 1 tablet and notify me.   - Follow up in 1-2 months for recheck.       JADA Panda  Department of Urology

## 2022-04-23 ENCOUNTER — HEALTH MAINTENANCE LETTER (OUTPATIENT)
Age: 52
End: 2022-04-23

## 2022-05-03 ENCOUNTER — TELEPHONE (OUTPATIENT)
Dept: UROLOGY | Facility: CLINIC | Age: 52
End: 2022-05-03
Payer: COMMERCIAL

## 2022-09-12 ENCOUNTER — TELEPHONE (OUTPATIENT)
Dept: LAB | Facility: CLINIC | Age: 52
End: 2022-09-12

## 2022-09-12 NOTE — TELEPHONE ENCOUNTER
Reason for call:  Other   Patient called regarding (reason for call): call back  Additional comments: PT was looking to schedule with a new provider coming to [place]. No schedule in system, can he be contacted when her schedule is created?     Phone number to reach patient:  Cell number on file:    Telephone Information:   Mobile [phone]       Best Time:  Anytime    Can we leave a detailed message on this number?  YES    Travel screening: Not Applicable

## 2022-10-17 NOTE — TELEPHONE ENCOUNTER
Spoke with patient informed patient that [facility] will not have a schedule to make appointments 3 weeks out she is in training

## 2022-10-29 ENCOUNTER — HEALTH MAINTENANCE LETTER (OUTPATIENT)
Age: 52
End: 2022-10-29

## 2022-11-29 PROBLEM — N20.0 NEPHROLITHIASIS: Status: ACTIVE | Noted: 2021-09-03

## 2022-12-20 ENCOUNTER — OFFICE VISIT (OUTPATIENT)
Dept: FAMILY MEDICINE | Facility: CLINIC | Age: 52
End: 2022-12-20
Payer: COMMERCIAL

## 2022-12-20 VITALS
DIASTOLIC BLOOD PRESSURE: 67 MMHG | HEIGHT: 73 IN | WEIGHT: 211 LBS | HEART RATE: 72 BPM | TEMPERATURE: 98.2 F | RESPIRATION RATE: 16 BRPM | BODY MASS INDEX: 27.96 KG/M2 | OXYGEN SATURATION: 99 % | SYSTOLIC BLOOD PRESSURE: 121 MMHG

## 2022-12-20 DIAGNOSIS — K21.9 GASTROESOPHAGEAL REFLUX DISEASE WITHOUT ESOPHAGITIS: ICD-10-CM

## 2022-12-20 DIAGNOSIS — Z12.11 SCREEN FOR COLON CANCER: ICD-10-CM

## 2022-12-20 DIAGNOSIS — Z13.0 SCREENING FOR DEFICIENCY ANEMIA: ICD-10-CM

## 2022-12-20 DIAGNOSIS — Z23 ENCOUNTER FOR IMMUNIZATION: ICD-10-CM

## 2022-12-20 DIAGNOSIS — Z13.0 SCREENING FOR ENDOCRINE, METABOLIC AND IMMUNITY DISORDER: ICD-10-CM

## 2022-12-20 DIAGNOSIS — Z13.220 SCREENING CHOLESTEROL LEVEL: ICD-10-CM

## 2022-12-20 DIAGNOSIS — Z00.00 ROUTINE MEDICAL EXAM: Primary | ICD-10-CM

## 2022-12-20 DIAGNOSIS — Z12.5 SCREENING FOR PROSTATE CANCER: ICD-10-CM

## 2022-12-20 DIAGNOSIS — R35.1 NOCTURIA: ICD-10-CM

## 2022-12-20 DIAGNOSIS — Z13.228 SCREENING FOR ENDOCRINE, METABOLIC AND IMMUNITY DISORDER: ICD-10-CM

## 2022-12-20 DIAGNOSIS — Z13.29 SCREENING FOR ENDOCRINE, METABOLIC AND IMMUNITY DISORDER: ICD-10-CM

## 2022-12-20 LAB
ANION GAP SERPL CALCULATED.3IONS-SCNC: 10 MMOL/L (ref 7–15)
BUN SERPL-MCNC: 10.1 MG/DL (ref 6–20)
CALCIUM SERPL-MCNC: 9.1 MG/DL (ref 8.6–10)
CHLORIDE SERPL-SCNC: 104 MMOL/L (ref 98–107)
CHOLEST SERPL-MCNC: 177 MG/DL
CREAT SERPL-MCNC: 0.95 MG/DL (ref 0.67–1.17)
DEPRECATED HCO3 PLAS-SCNC: 27 MMOL/L (ref 22–29)
ERYTHROCYTE [DISTWIDTH] IN BLOOD BY AUTOMATED COUNT: 15.6 % (ref 10–15)
GFR SERPL CREATININE-BSD FRML MDRD: >90 ML/MIN/1.73M2
GLUCOSE SERPL-MCNC: 83 MG/DL (ref 70–99)
HCT VFR BLD AUTO: 44.5 % (ref 40–53)
HDLC SERPL-MCNC: 41 MG/DL
HGB BLD-MCNC: 13.7 G/DL (ref 13.3–17.7)
LDLC SERPL CALC-MCNC: 81 MG/DL
MCH RBC QN AUTO: 25.5 PG (ref 26.5–33)
MCHC RBC AUTO-ENTMCNC: 30.8 G/DL (ref 31.5–36.5)
MCV RBC AUTO: 83 FL (ref 78–100)
NONHDLC SERPL-MCNC: 136 MG/DL
PLATELET # BLD AUTO: 253 10E3/UL (ref 150–450)
POTASSIUM SERPL-SCNC: 4 MMOL/L (ref 3.4–5.3)
PSA SERPL-MCNC: 1.44 NG/ML (ref 0–3.5)
RBC # BLD AUTO: 5.38 10E6/UL (ref 4.4–5.9)
SODIUM SERPL-SCNC: 141 MMOL/L (ref 136–145)
TRIGL SERPL-MCNC: 274 MG/DL
WBC # BLD AUTO: 5.8 10E3/UL (ref 4–11)

## 2022-12-20 PROCEDURE — G0103 PSA SCREENING: HCPCS | Performed by: PHYSICIAN ASSISTANT

## 2022-12-20 PROCEDURE — 85027 COMPLETE CBC AUTOMATED: CPT | Performed by: PHYSICIAN ASSISTANT

## 2022-12-20 PROCEDURE — 99396 PREV VISIT EST AGE 40-64: CPT | Mod: 25 | Performed by: PHYSICIAN ASSISTANT

## 2022-12-20 PROCEDURE — 99213 OFFICE O/P EST LOW 20 MIN: CPT | Mod: 25 | Performed by: PHYSICIAN ASSISTANT

## 2022-12-20 PROCEDURE — 80048 BASIC METABOLIC PNL TOTAL CA: CPT | Performed by: PHYSICIAN ASSISTANT

## 2022-12-20 PROCEDURE — 36415 COLL VENOUS BLD VENIPUNCTURE: CPT | Performed by: PHYSICIAN ASSISTANT

## 2022-12-20 PROCEDURE — 80061 LIPID PANEL: CPT | Performed by: PHYSICIAN ASSISTANT

## 2022-12-20 PROCEDURE — 90682 RIV4 VACC RECOMBINANT DNA IM: CPT | Performed by: PHYSICIAN ASSISTANT

## 2022-12-20 PROCEDURE — 90471 IMMUNIZATION ADMIN: CPT | Performed by: PHYSICIAN ASSISTANT

## 2022-12-20 ASSESSMENT — ENCOUNTER SYMPTOMS
CONSTIPATION: 0
PALPITATIONS: 0
NAUSEA: 0
ABDOMINAL PAIN: 0
COUGH: 0
MYALGIAS: 0
DYSURIA: 0
JOINT SWELLING: 0
DIARRHEA: 0
SHORTNESS OF BREATH: 0
HEADACHES: 1
NERVOUS/ANXIOUS: 0
HEARTBURN: 1
PARESTHESIAS: 0
SORE THROAT: 0
FREQUENCY: 0
EYE PAIN: 0
HEMATURIA: 0
WEAKNESS: 0
HEMATOCHEZIA: 0
FEVER: 0
CHILLS: 0
DIZZINESS: 0
ARTHRALGIAS: 0

## 2022-12-20 ASSESSMENT — PAIN SCALES - GENERAL: PAINLEVEL: NO PAIN (0)

## 2022-12-20 NOTE — PROGRESS NOTES
SUBJECTIVE:   CC: Manfred is an 52 year old who presents for preventative health visit.   Patient has been advised of split billing requirements and indicates understanding: Yes  Healthy Habits:     Getting at least 3 servings of Calcium per day:  Yes    Bi-annual eye exam:  Yes    Dental care twice a year:  Yes    Sleep apnea or symptoms of sleep apnea:  None    Diet:  Vegetarian/vegan    Frequency of exercise:  4-5 days/week    Duration of exercise:  30-45 minutes    Taking medications regularly:  Yes    Medication side effects:  Not applicable    PHQ-2 Total Score: 0    Additional concerns today:  No    Nocturia improved - not on any medications  Limiting liquids    Increase in reflux/heartburn doesn't seem to be related to food  TUMS helps a bit    Today's PHQ-2 Score:   PHQ-2 ( 1999 Pfizer) 12/20/2022   Q1: Little interest or pleasure in doing things 0   Q2: Feeling down, depressed or hopeless 0   PHQ-2 Score 0   Q1: Little interest or pleasure in doing things Not at all   Q2: Feeling down, depressed or hopeless Not at all   PHQ-2 Score 0     Social History     Tobacco Use     Smoking status: Never     Smokeless tobacco: Never   Substance Use Topics     Alcohol use: Yes       Alcohol Use 12/20/2022   Prescreen: >3 drinks/day or >7 drinks/week? No       Last PSA: No results found for: PSA    Reviewed orders with patient. Reviewed health maintenance and updated orders accordingly - Yes    Reviewed and updated as needed this visit by clinical staff   Tobacco  Allergies  Meds              Reviewed and updated as needed this visit by Provider                   Review of Systems   Constitutional: Negative for chills and fever.   HENT: Negative for congestion, ear pain, hearing loss and sore throat.    Eyes: Negative for pain and visual disturbance.   Respiratory: Negative for cough and shortness of breath.    Cardiovascular: Negative for chest pain, palpitations and peripheral edema.   Gastrointestinal: Positive for  "heartburn. Negative for abdominal pain, constipation, diarrhea, hematochezia and nausea.   Genitourinary: Negative for dysuria, frequency, genital sores, hematuria, impotence, penile discharge and urgency.   Musculoskeletal: Negative for arthralgias, joint swelling and myalgias.   Skin: Negative for rash.   Neurological: Positive for headaches. Negative for dizziness, weakness and paresthesias.   Psychiatric/Behavioral: Negative for mood changes. The patient is not nervous/anxious.        OBJECTIVE:   /67 (BP Location: Left arm, Patient Position: Sitting, Cuff Size: Adult Regular)   Pulse 72   Temp 98.2  F (36.8  C) (Tympanic)   Resp 16   Ht 1.854 m (6' 1\")   Wt 95.7 kg (211 lb)   SpO2 99%   BMI 27.84 kg/m      Physical Exam  GENERAL: healthy, alert and no distress  EYES: Eyes grossly normal to inspection, PERRL and conjunctivae and sclerae normal  HENT: ear canals and TM's normal, nose and mouth without ulcers or lesions  NECK: no adenopathy, no asymmetry, masses, or scars and thyroid normal to palpation  RESP: lungs clear to auscultation - no rales, rhonchi or wheezes  CV: regular rate and rhythm, normal S1 S2, no S3 or S4, no murmur, click or rub, no peripheral edema and peripheral pulses strong  ABDOMEN: soft, nontender, no hepatosplenomegaly, no masses and bowel sounds normal  MS: no gross musculoskeletal defects noted, no edema  SKIN: no suspicious lesions or rashes  NEURO: Normal strength and tone, mentation intact and speech normal  PSYCH: mentation appears normal, affect normal/bright      ASSESSMENT/PLAN:   Manfred was seen today for physical.    Diagnoses and all orders for this visit:    Routine medical exam  -     REVIEW OF HEALTH MAINTENANCE PROTOCOL ORDERS    Gastroesophageal reflux disease without esophagitis - recommend PPI treatment for 30-60 days. Given not triggered by food, recommend h pylori testing. Follow up if symptoms not resolved after 60 days of PPI.  -     Helicobacter pylori " "Antigen Stool; Future  -     omeprazole (PRILOSEC) 20 MG DR capsule; Take 1 capsule (20 mg) by mouth daily  -     Helicobacter pylori Antigen Stool    Nocturia - improved    Screen for colon cancer  -     COLOGUAMEG(EXACT SCIENCES); Future    Encounter for immunization  -     INFLUENZA QUAD, RECOMBINANT, P-FREE (RIV4) (FLUBLOK)    Screening for deficiency anemia  -     CBC with platelets; Future  -     CBC with platelets    Screening for endocrine, metabolic and immunity disorder  -     Basic metabolic panel; Future  -     Basic metabolic panel    Screening cholesterol level  -     Lipid panel reflex to direct LDL Non-fasting; Future  -     Lipid panel reflex to direct LDL Non-fasting    Screening for prostate cancer  -     PSA, screen; Future  -     PSA, screen    COUNSELING:   Reviewed preventive health counseling, as reflected in patient instructions       Regular exercise       Healthy diet/nutrition       Vision screening       Immunizations    Vaccinated for: Influenza and Declined: Hepatitis B and Zoster due to Other        BMI:   Estimated body mass index is 27.84 kg/m  as calculated from the following:    Height as of this encounter: 1.854 m (6' 1\").    Weight as of this encounter: 95.7 kg (211 lb).       He reports that he has never smoked. He has never used smokeless tobacco.            Bethany Graham PA-C  [facility]  "

## 2022-12-21 PROCEDURE — 87338 HPYLORI STOOL AG IA: CPT | Performed by: PHYSICIAN ASSISTANT

## 2022-12-22 LAB — H PYLORI AG STL QL IA: NEGATIVE

## 2023-01-05 LAB — NONINV COLON CA DNA+OCC BLD SCRN STL QL: NEGATIVE

## 2023-02-14 ENCOUNTER — TELEPHONE (OUTPATIENT)
Dept: TRANSPLANT | Facility: CLINIC | Age: 53
End: 2023-02-14
Payer: COMMERCIAL

## 2023-02-14 NOTE — TELEPHONE ENCOUNTER
I called Manfred today after receiving the email message from him.  I was previously his coordinator.  He was denied for bilateral stones.  He is ABO compatible for the recipient he would like to donate to.  He is 52 years old and states he is good health.  I will send him the link for MANPREET Aguilar,  I have no idea if you're the right person to ask this question or not, but here goes. My cousin's , not a blood relative, desperately needs a liver transplant. I think he's only been on the recipient list since December. He is rapidly running out of potential family member donors, no matches yet.     Is the testing I've had enough for a preliminary check to see if I'd be a match?    Thanks  Manfred

## 2023-02-16 ENCOUNTER — OFFICE VISIT (OUTPATIENT)
Dept: URGENT CARE | Facility: URGENT CARE | Age: 53
End: 2023-02-16
Payer: COMMERCIAL

## 2023-02-16 VITALS
DIASTOLIC BLOOD PRESSURE: 80 MMHG | TEMPERATURE: 97.2 F | SYSTOLIC BLOOD PRESSURE: 110 MMHG | RESPIRATION RATE: 15 BRPM | OXYGEN SATURATION: 98 % | HEART RATE: 68 BPM

## 2023-02-16 DIAGNOSIS — R07.0 THROAT PAIN: ICD-10-CM

## 2023-02-16 DIAGNOSIS — J02.9 VIRAL PHARYNGITIS: Primary | ICD-10-CM

## 2023-02-16 LAB
DEPRECATED S PYO AG THROAT QL EIA: NEGATIVE
GROUP A STREP BY PCR: NOT DETECTED

## 2023-02-16 PROCEDURE — U0003 INFECTIOUS AGENT DETECTION BY NUCLEIC ACID (DNA OR RNA); SEVERE ACUTE RESPIRATORY SYNDROME CORONAVIRUS 2 (SARS-COV-2) (CORONAVIRUS DISEASE [COVID-19]), AMPLIFIED PROBE TECHNIQUE, MAKING USE OF HIGH THROUGHPUT TECHNOLOGIES AS DESCRIBED BY CMS-2020-01-R: HCPCS | Performed by: FAMILY MEDICINE

## 2023-02-16 PROCEDURE — 99213 OFFICE O/P EST LOW 20 MIN: CPT | Mod: CS | Performed by: FAMILY MEDICINE

## 2023-02-16 PROCEDURE — 87651 STREP A DNA AMP PROBE: CPT | Performed by: FAMILY MEDICINE

## 2023-02-16 PROCEDURE — U0005 INFEC AGEN DETEC AMPLI PROBE: HCPCS | Performed by: FAMILY MEDICINE

## 2023-02-16 NOTE — PATIENT INSTRUCTIONS
Ibuprofen 600 mg every 4 hours as needed for discomfort can also try Tylenol 500 mg in addition.      Your strep and COVID PCR test should return in the next 24 hours [facility]      If symptoms worsen return to be evaluated

## 2023-02-16 NOTE — PROGRESS NOTES
Assessment & Plan     Throat pain  - Streptococcus A Rapid Screen w/Reflex to PCR - Clinic Collect  - Group A Streptococcus PCR Throat Swab    Viral pharyngitis  - Symptomatic COVID-19 Virus (Coronavirus) by PCR Nose     Exam does not suggest peritonsillar abscess this is suggestive of a viral infection he does consent to COVID PCR testing.  Strep PCR is also pending at this time as the rapid returned negative.  Symptomatic treatment with ibuprofen recommended.  Expectations are that this should improve in the next few days.      Gregorio Poon MD   [place] [facility]    Radha Shearer is a 52 year old male who presents to clinic today for the following health issues:  Chief Complaint   Patient presents with     Urgent Care     Pharyngitis     Sore throat and fever x 2 days. Home Covid neg     HPI    Patient reporting 2 to 3 days of sore throat and low-grade fevers no higher than 101.  Absent of nasal congestion or coughing.  Home negative COVID test.  No difficulty with opening mouth.  Has tried ibuprofen which has been helpful although at nighttime when laying down seems to aggravate his symptoms.  He does not snore at night.    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           Objective    /80   Pulse 68   Temp 97.2  F (36.2  C) (Temporal)   Resp 15   SpO2 98%   Physical Exam     Throat: no trismus, no enlarged tonsils or exudates, uvula midline  Cv: HDS  Pulm: non-labored    Results for  orders placed or performed in visit on 02/16/23   Streptococcus A Rapid Screen w/Reflex to PCR - Clinic Collect     Status: Normal    Specimen: Throat; Swab   Result Value Ref Range    Group A Strep antigen Negative Negative                     The use of Dragon/[email or web address].me dictation services may have been used to construct the content in this note; any grammatical or spelling errors are non-intentional. Please contact the author of this note directly if you are in need of any clarification.

## 2023-02-17 LAB — SARS-COV-2 RNA RESP QL NAA+PROBE: NEGATIVE

## 2023-02-21 ENCOUNTER — TELEPHONE (OUTPATIENT)
Dept: TRANSPLANT | Facility: CLINIC | Age: 53
End: 2023-02-21
Payer: COMMERCIAL

## 2023-02-21 NOTE — TELEPHONE ENCOUNTER
"Registered As: Directed Donor  Donor Intake Start: 23 Donor Intake Complete: 23  Gender: Male Preferred Language: English  Full Name: Manfred Young Is  Needed: [not answered]  E-mail: [email or web address] Phone Number: [phone]  Secondary Phone:  Contact Preference: [not answered] Best Contact Time: Noon - 5pm  Emergency Contact: Demetria Hector Emergency Contact #: [phone]  Relationship to Contact: Contact is my parent  : 74 Age: 52  Address: [address] [place] [address] City: [place]  State: [place] Postal Code: [id number]  Height: 6'1\" Weight: 200lbs  BMI: 26.4  Employment Status: Employed Has PTO for donation? No, not reimbursed  Occupation:  Requires Heavy Lifting? Yes  Education Level: High School Marital Status:   Exercise Routine: 2-3/Week Health Insurance: Yes  Blood Type: A Ethnicity/Race: White  Donor Type: Directed Donor  Prefer Remote Donation: [not answered]  Physician: Bethany Graham<br/>EVELIN Ann  Donating for Local Recipient  Recipient's Name: Shai Ruelas Recipient's : Doesn't know  Recipient's Status: Unknown How Candidate Knows Recip: Other  Candidate communicates w/  Recip: Less Than Once A Month  Possible Interest In:  Motivation to donate: My cousin's  is awaiting a transplant  Living Donor Pre-Screening  Is In [place]  Will accept blood transfusions? Yes  Has been diagnosed with kidney disease? No  Has had a heart attack? No  Has Diabetes (High BGs)? No  Has had cancer? No  Has had kidney stones? [not answered]  Has Used Tobacco? No  Has HIV? No  Is Currently Incarcerated? No  Is Currently Residing in [place] Yes  History Misc  Has Allergies? Yes  Allergy  Penicillin  Bee venom  Has had Surgeries? Yes  Surgery When  Detached retina 1991  Knee scope 1991  Takes Medication? Yes  Medication Dose Frequency  Ibuprofen 400mg As needed for headache  Tadalafil 5mg 2-3 times weekly  Medical History  History of High BP? Never  History of CABG " (bypass surgery)? No  History of blood clots? Never  History of coronary disease? Never  History of high cholesterol or triglycerides? Never  Has stents implanted? No  History of chest pain during exercise? No  History of chest pain at other times? No  Results of climbing 2 flights of stairs? No Problem  Had stress test in last year? No  Has had stroke? No  Has had leg bypass? No  History of lung disease? Never  History of COPD? Never  History of TB? Never  History of Pneumonia? Never  Has respiratory issues? No  Has HIV? No  Has Gastro Issues? No  History of Gallstones? Never  History of Pancreatitis? Never  History of Liver Disease? Never  History of Hepatitis B? Never  History of Hepatitis C? Never  History of bleeding problems? Never  History of UTIs? No  History of kidney damage? Never  History of Proteinuria? Never  History of Hematuria? Never  History of neuro disease? Never  History of seizure? Never  History of lupus? Never  History of paralysis? Never  History of arthritis? Never  History of neuropathy? Never  History of depression? Never  History of anxiety? Never  History of documented psychiatric illness? Never  Has had transfusions? No  History of Obesity? No  History of Fabry's Disease? No  History of Sickle Cell Disease? No  History of Sickle Cell Trait? No  History of Sarcoidosis? No  Has auto-immune disease? No  Has had Physical Exam? Yes   - how many years ago: 1  Has had PSA Test? Yes   - how many years ago: 1  Colonoscopy? No  Medical history comments? [no comments]  Living Donor Family Medical History  Anyone with kidney disease? No  Anyone with liver disease? No  Anyone with heart disease? Yes   - which family members: Maternal grandfather  Anyone with coronary artery disease? Unknown  Anyone with high blood pressure? No  Anyone with blood disorder? No  Anyone with cancer? No  Anyone with kidney cancer? No  Anyone with diabetes? No  Is mother alive? Yes  Mother's age? 71  Is father alive?  Yes  Father's age? 71  How many siblings? 1  How many adult children? 1  How many children under 18? 0  Social History  Has Used Alcohol? Yes   - currently uses alcohol: Yes   - how much: 2/Weekly  Has Abused Alcohol? No  Has Used Drugs? No  Has had legal issues w/ law enforcement? No  Traveled over 100 miles from home in last year? Yes   - Traveled Where? [place], [place], [place],  [place]  Has had suicidal thoughts or attempts in the last five years? No

## 2023-02-25 DIAGNOSIS — K21.9 GASTROESOPHAGEAL REFLUX DISEASE WITHOUT ESOPHAGITIS: ICD-10-CM

## 2023-02-27 ENCOUNTER — DOCUMENTATION ONLY (OUTPATIENT)
Dept: TRANSPLANT | Facility: CLINIC | Age: 53
End: 2023-02-27
Payer: COMMERCIAL

## 2023-02-27 NOTE — PROGRESS NOTES
Discussed Manfred's DASH questionaire where he now wants to test to be a LIVER donor for his cousin's .They are abo compat.He prev came for NDD kidney eval in 2021 and was denied d/t bilat kidney stones.Had [facility] test prior to coming(hx 1 kidney stone 10yrs prior) and it was indicating low urine output so was to increase fluids.Visited [facility] and needed to go on low oxylate diet.Reviewed his CT from 2021. Had Cholelithiasis but liver looked good.Per Dr Walter and Dr Bazzi we were OK to cont process for liver donor.Now spoke with Manfred. LATANYA appt 3/2 at 1400.

## 2023-03-02 ENCOUNTER — TELEPHONE (OUTPATIENT)
Dept: TRANSPLANT | Facility: CLINIC | Age: 53
End: 2023-03-02
Payer: COMMERCIAL

## 2023-03-02 NOTE — TELEPHONE ENCOUNTER
Initial Independent Living Donor Advocate contact made with potential donor today.  I introduced myself and my role during the donation process, includin.  LATANYA ROLE   The federal government requires that all licensed transplant centers provide the living donor with an Independent Living Donor Advocate (LATANYA).  I do not meet recipients or attend meetings that discuss their care or decision to transplant them. My role is separate to avoid any conflict of interest.  My role is to ensure:  1) your rights are protected;  2) you get all the information you need from the transplant team to make a fully informed decision whether to donate;   3) that living donation is in your best interest.   4) that you have the right to decide NOT to go forward with living donation at any time during this process.  I am available to you throughout the workup, during surgery phase and follow-up at home.     2. WORKUP & PRIVACY     Your identity and workup are not shared with the recipient at any time.     The recipient's insurance covers the medical expenses related to the donor evaluation and surgery.  However, it is important that you carry your own health insurance to address any medical issues that are found and are NOT related to living donation.  Additionally, age appropriate cancer screening (I.e. mammograms,  colonoscopies, etc) is required and would be through your insurance.    There is a psychosocial and medical donor workup that consists of testing to determine if you are healthy enough to donate. Workup tests include tissue typing/genetics, many blood draws, urine collection/ (kidney function testing), chest x-ray, EKG/other heart testing, CT scan. Age appropriate cancer screening is required and would be through your insurance. As you complete each step then you may move on to the next.  Workup can take as little or as long as you need and you can stop the process at any time. Transplant is a treatment option, not a  cure.  Other treatment options are  donation.     This is major surgery and your estimated hospital stay is approximately one week.  After surgery, there are driving and lifting restrictions - no driving for two weeks and no lifting over ten pounds for 8 weeks.  Donors are routinely off from work for 8 weeks after surgery, and potentially longer if they have a physical job.       If you anticipate lost wages due to donation, donor wage reimbursement options may be available to you and will be reviewed with you during the evaluation process. Donor Shield and NLDAC explained.    We reviewed the importance of completing follow-up labs and surveys at six months, 1 year and 2 years after donation to monitor kidney health and the impact donation has had on their life post donation.       3.  QUESTIONS    Have you received the Welcome e-mail that includes copies of the informed consent, financial letter, information on donor shield and NLDAC from the transplant department? Yes. Questions? No.    Have you discussed with anyone your potential decision to donate?   No.    Is anyone pressuring or coercing you to donate? No.    Have you discussed any financial arrangements with recipient around donating a kidney? No.    Are you aware that you can confidentially opt out at any time, up to and including day of donation? Yes.    At this time, would you like to proceed with the medical evaluation to see if you can be a kidney donor?  Yes.    If yes, I will make an appointment for your donor coordinator to reach out to you with next steps.     Contact information for LATANYA's was provided Yes.    Radha Sinha- [phone]  Catrina Ramin- [phone]      Time frame for donation:  TBD  Yes.  MyChart was initiated Active  CareEverywhere was initiated  - active    LATANYA NOTES: Manfred is interested in considering liver donation, to his cousin's , Shai Ruelas.  Shai is not aware that he is considering this.  Manfred went  through our living kidney donor evaluation a couple of years ago but was turned down due to stones.  He's now interested in liver donation.  Some concerns about the length of time off of work and risks of complications.  He builds furniture for a living.  He is  but says that he has people who would be available to care for him.  Also concerned that he was told that he has gallstone and they may take his gallbladder, which he doesn't really want.  He would like to continue the evaluation process at this point.  Appt with Geeta was scheduled for 3/13 at 9AM      Duration of call 35 minutes

## 2023-03-09 ENCOUNTER — TELEPHONE (OUTPATIENT)
Dept: TRANSPLANT | Facility: CLINIC | Age: 53
End: 2023-03-09
Payer: COMMERCIAL

## 2023-04-19 ENCOUNTER — TELEPHONE (OUTPATIENT)
Dept: FAMILY MEDICINE | Facility: CLINIC | Age: 53
End: 2023-04-19

## 2023-04-19 ENCOUNTER — APPOINTMENT (OUTPATIENT)
Dept: ULTRASOUND IMAGING | Facility: CLINIC | Age: 53
End: 2023-04-19
Attending: FAMILY MEDICINE
Payer: COMMERCIAL

## 2023-04-19 ENCOUNTER — HOSPITAL ENCOUNTER (EMERGENCY)
Facility: CLINIC | Age: 53
Discharge: HOME | End: 2023-04-19
Attending: FAMILY MEDICINE | Admitting: FAMILY MEDICINE
Payer: COMMERCIAL

## 2023-04-19 ENCOUNTER — APPOINTMENT (OUTPATIENT)
Dept: CT IMAGING | Facility: CLINIC | Age: 53
End: 2023-04-19
Attending: FAMILY MEDICINE
Payer: COMMERCIAL

## 2023-04-19 VITALS
OXYGEN SATURATION: 98 % | DIASTOLIC BLOOD PRESSURE: 77 MMHG | RESPIRATION RATE: 18 BRPM | HEART RATE: 60 BPM | TEMPERATURE: 98.3 F | SYSTOLIC BLOOD PRESSURE: 127 MMHG

## 2023-04-19 DIAGNOSIS — K80.20 CALCULUS OF GALLBLADDER WITHOUT CHOLECYSTITIS WITHOUT OBSTRUCTION: ICD-10-CM

## 2023-04-19 LAB
ALBUMIN SERPL BCG-MCNC: 4.4 G/DL (ref 3.5–5.2)
ALP SERPL-CCNC: 60 U/L (ref 40–129)
ALT SERPL W P-5'-P-CCNC: 33 U/L (ref 10–50)
ANION GAP SERPL CALCULATED.3IONS-SCNC: 11 MMOL/L (ref 7–15)
AST SERPL W P-5'-P-CCNC: 43 U/L (ref 10–50)
BASOPHILS # BLD AUTO: 0 10E3/UL (ref 0–0.2)
BASOPHILS NFR BLD AUTO: 1 %
BILIRUB SERPL-MCNC: 0.3 MG/DL
BUN SERPL-MCNC: 13 MG/DL (ref 6–20)
CALCIUM SERPL-MCNC: 9.3 MG/DL (ref 8.6–10)
CHLORIDE SERPL-SCNC: 102 MMOL/L (ref 98–107)
CREAT SERPL-MCNC: 0.77 MG/DL (ref 0.67–1.17)
DEPRECATED HCO3 PLAS-SCNC: 23 MMOL/L (ref 22–29)
EOSINOPHIL # BLD AUTO: 0.1 10E3/UL (ref 0–0.7)
EOSINOPHIL NFR BLD AUTO: 2 %
ERYTHROCYTE [DISTWIDTH] IN BLOOD BY AUTOMATED COUNT: 14.6 % (ref 10–15)
GFR SERPL CREATININE-BSD FRML MDRD: >90 ML/MIN/1.73M2
GLUCOSE SERPL-MCNC: 97 MG/DL (ref 70–99)
HCT VFR BLD AUTO: 32.9 % (ref 40–53)
HGB BLD-MCNC: 10 G/DL (ref 13.3–17.7)
IMM GRANULOCYTES # BLD: 0 10E3/UL
IMM GRANULOCYTES NFR BLD: 0 %
LIPASE SERPL-CCNC: 52 U/L (ref 13–60)
LYMPHOCYTES # BLD AUTO: 1.2 10E3/UL (ref 0.8–5.3)
LYMPHOCYTES NFR BLD AUTO: 20 %
MCH RBC QN AUTO: 25.3 PG (ref 26.5–33)
MCHC RBC AUTO-ENTMCNC: 30.4 G/DL (ref 31.5–36.5)
MCV RBC AUTO: 83 FL (ref 78–100)
MONOCYTES # BLD AUTO: 0.8 10E3/UL (ref 0–1.3)
MONOCYTES NFR BLD AUTO: 12 %
NEUTROPHILS # BLD AUTO: 3.9 10E3/UL (ref 1.6–8.3)
NEUTROPHILS NFR BLD AUTO: 65 %
NRBC # BLD AUTO: 0 10E3/UL
NRBC BLD AUTO-RTO: 0 /100
PLATELET # BLD AUTO: 181 10E3/UL (ref 150–450)
POTASSIUM SERPL-SCNC: 4.6 MMOL/L (ref 3.4–5.3)
PROT SERPL-MCNC: 6.7 G/DL (ref 6.4–8.3)
RBC # BLD AUTO: 3.96 10E6/UL (ref 4.4–5.9)
SODIUM SERPL-SCNC: 136 MMOL/L (ref 136–145)
WBC # BLD AUTO: 6.1 10E3/UL (ref 4–11)

## 2023-04-19 PROCEDURE — 250N000009 HC RX 250: Performed by: FAMILY MEDICINE

## 2023-04-19 PROCEDURE — 85025 COMPLETE CBC W/AUTO DIFF WBC: CPT | Performed by: FAMILY MEDICINE

## 2023-04-19 PROCEDURE — 250N000011 HC RX IP 250 OP 636: Performed by: FAMILY MEDICINE

## 2023-04-19 PROCEDURE — 96361 HYDRATE IV INFUSION ADD-ON: CPT | Performed by: FAMILY MEDICINE

## 2023-04-19 PROCEDURE — 74177 CT ABD & PELVIS W/CONTRAST: CPT

## 2023-04-19 PROCEDURE — 258N000003 HC RX IP 258 OP 636: Performed by: FAMILY MEDICINE

## 2023-04-19 PROCEDURE — 93010 ELECTROCARDIOGRAM REPORT: CPT | Performed by: FAMILY MEDICINE

## 2023-04-19 PROCEDURE — 80053 COMPREHEN METABOLIC PANEL: CPT | Performed by: FAMILY MEDICINE

## 2023-04-19 PROCEDURE — 83690 ASSAY OF LIPASE: CPT | Performed by: FAMILY MEDICINE

## 2023-04-19 PROCEDURE — 99285 EMERGENCY DEPT VISIT HI MDM: CPT | Mod: 25 | Performed by: FAMILY MEDICINE

## 2023-04-19 PROCEDURE — 96374 THER/PROPH/DIAG INJ IV PUSH: CPT | Mod: 59 | Performed by: FAMILY MEDICINE

## 2023-04-19 PROCEDURE — 76705 ECHO EXAM OF ABDOMEN: CPT

## 2023-04-19 PROCEDURE — 96375 TX/PRO/DX INJ NEW DRUG ADDON: CPT | Performed by: FAMILY MEDICINE

## 2023-04-19 PROCEDURE — 99284 EMERGENCY DEPT VISIT MOD MDM: CPT | Mod: 25 | Performed by: FAMILY MEDICINE

## 2023-04-19 PROCEDURE — 36415 COLL VENOUS BLD VENIPUNCTURE: CPT | Performed by: FAMILY MEDICINE

## 2023-04-19 RX ORDER — SODIUM CHLORIDE 9 MG/ML
INJECTION, SOLUTION INTRAVENOUS CONTINUOUS
Status: DISCONTINUED | OUTPATIENT
Start: 2023-04-19 | End: 2023-04-20 | Stop reason: HOSPADM

## 2023-04-19 RX ORDER — KETOROLAC TROMETHAMINE 30 MG/ML
30 INJECTION, SOLUTION INTRAMUSCULAR; INTRAVENOUS ONCE
Status: COMPLETED | OUTPATIENT
Start: 2023-04-19 | End: 2023-04-19

## 2023-04-19 RX ORDER — IOPAMIDOL 755 MG/ML
100 INJECTION, SOLUTION INTRAVASCULAR ONCE
Status: COMPLETED | OUTPATIENT
Start: 2023-04-19 | End: 2023-04-19

## 2023-04-19 RX ORDER — HYDROMORPHONE HYDROCHLORIDE 1 MG/ML
0.5 INJECTION, SOLUTION INTRAMUSCULAR; INTRAVENOUS; SUBCUTANEOUS ONCE
Status: COMPLETED | OUTPATIENT
Start: 2023-04-19 | End: 2023-04-19

## 2023-04-19 RX ORDER — OXYCODONE AND ACETAMINOPHEN 5; 325 MG/1; MG/1
1 TABLET ORAL EVERY 6 HOURS PRN
Qty: 12 TABLET | Refills: 0 | Status: ON HOLD | OUTPATIENT
Start: 2023-04-19 | End: 2023-08-07

## 2023-04-19 RX ADMIN — SODIUM CHLORIDE 66 ML: 9 INJECTION, SOLUTION INTRAVENOUS at 21:35

## 2023-04-19 RX ADMIN — SODIUM CHLORIDE 1000 ML: 9 INJECTION, SOLUTION INTRAVENOUS at 18:14

## 2023-04-19 RX ADMIN — IOPAMIDOL 104 ML: 755 INJECTION, SOLUTION INTRAVENOUS at 21:34

## 2023-04-19 RX ADMIN — HYDROMORPHONE HYDROCHLORIDE 0.5 MG: 1 INJECTION, SOLUTION INTRAMUSCULAR; INTRAVENOUS; SUBCUTANEOUS at 18:11

## 2023-04-19 RX ADMIN — SODIUM CHLORIDE: 9 INJECTION, SOLUTION INTRAVENOUS at 21:07

## 2023-04-19 RX ADMIN — KETOROLAC TROMETHAMINE 30 MG: 30 INJECTION, SOLUTION INTRAMUSCULAR; INTRAVENOUS at 18:12

## 2023-04-19 ASSESSMENT — ACTIVITIES OF DAILY LIVING (ADL)
ADLS_ACUITY_SCORE: 35

## 2023-04-19 NOTE — TELEPHONE ENCOUNTER
S-(situation): Severe RUQ pain. 9-10/10. Has lasted for about the last 3 hours.     B-(background): Patient was told he has gall stones about 3 years but has not had any problems.     A-(assessment): Patient having difficulty talking due to pain. Patient needs to go to the ER due to scans that are available.   Writer spoke with PCP who stated ER.     R-(recommendations): Go to ER now. Patient was asked if he had someone to drive him. He said he did or call 911.     Su Celis, DARWINN RN  [facility]

## 2023-04-19 NOTE — ED PROVIDER NOTES
"ED Provider Note  [facility]      History     Chief Complaint   Patient presents with     Abdominal Pain     Right upper quadrant pain, here for ultrasound.     HPI  Manfred Young is a 52 year old male with past medical history of nephrolithiasis and incidental finding of gallstones on CT who presents with acute RUQ pain.  Patient states pain began at 1 PM while eating lunch ([place], hummus, carrots).  Pain is progressive, stabbing, and localized, does not radiate.  Currently rates pain at 7/10.  Denies nausea, vomiting, change in stool consistency, or other symptoms.  While in the ED has begun feeling \"hot\".  Otherwise denies fevers or chills.  Tried taking hanna (which may have made pain worse) and apple cider vinegar (which seemed to have helped).  Reports drinking rarely, about 1-2 drinks per week.    Past Medical History  Past Medical History:   Diagnosis Date     NO ACTIVE PROBLEMS      Past Surgical History:   Procedure Laterality Date     SURGICAL HISTORY OF -       detached retina     SURGICAL HISTORY OF -       arthroscopic knee surgery-Right x1, left x1, plica repair     oxyCODONE-acetaminophen (PERCOCET) 5-325 MG tablet  omeprazole (PRILOSEC) 20 MG DR capsule  tadalafil (CIALIS) 5 MG tablet      Allergies   Allergen Reactions     Bee Venom Swelling     Gets hot     Chloraprep One Step Itching     Has a sensitivity     Penicillins Diarrhea     Wasp Venom Protein Other (See Comments)     High fever after being stung by bee.     Family History  Family History   Problem Relation Age of Onset     Cerebrovascular Disease Maternal Grandmother      Osteoporosis Mother      Lipids Father      Social History   Social History     Tobacco Use     Smoking status: Never     Smokeless tobacco: Never   Vaping Use     Vaping status: Never Used   Substance Use Topics     Alcohol use: Yes     Drug use: No      Past medical history, past surgical history, medications, allergies, family " history, and social history were reviewed with the patient. No additional pertinent items.      A medically appropriate review of systems was performed with pertinent positives and negatives noted in the HPI, and all other systems negative.    Physical Exam   BP: 130/82  Pulse: 61  Temp: 98.1  F (36.7  C)  Resp: 16  SpO2: 100 %  Physical Exam  General: Afebrile, no acute distress but appears uncomfortable.  HEENT: Normocephalic, atraumatic, conjunctivae normal. MMM.  Neck: Non-tender, supple.  Cardio: Regular rate. Regular rhythm.   Resp: Normal work of breathing, no respiratory distress, lungs clear bilaterally, no wheezing, rhonchi, rales.  Chest/Back: No visual signs of trauma, no CVA tenderness.   Abdomen: Soft, non distended.  Tender over the RUQ, positive Garcia sign.    Neuro: Alert and fully oriented. CN II-XII grossly intact. Grossly normal strength and sensation in all extremities.   MSK: No deformities. Normal range of motion.  Integumentary/Skin: No rash visualized, normal color.  Psych: Normal affect, normal behavior.      ED Course, Procedures, & Data      Procedures                Results for orders placed or performed during the hospital encounter of 04/19/23   US Abdomen Limited (RUQ)     Status: None    Narrative    EXAM: US ABDOMEN LIMITED  LOCATION: [facility]  DATE/TIME: 4/19/2023 6:46 PM CDT    INDICATION: Right upper quadrant abdominal pain.  COMPARISON: CTA 04/16/2021.  TECHNIQUE: Limited abdominal ultrasound.    FINDINGS:    GALLBLADDER: Single mobile 9 mm calculus within the gallbladder neck. No gallbladder wall thickening or pericholecystic fluid. Sonographic Garcia's sign is negative.    BILE DUCTS: No biliary dilatation. The common duct measures 4 mm.    LIVER: Normal parenchyma with smooth contour. No focal mass.    RIGHT KIDNEY: No hydronephrosis.    PANCREAS: Visualized portions of the neck and proximal body are normal. Remainder is  obscured by bowel gas.    No ascites.      Impression    IMPRESSION:    1.  Cholelithiasis. No evidence of acute cholecystitis.    2.  No biliary ductal dilation.   CT Abdomen Pelvis w Contrast     Status: None    Narrative    EXAM: CT ABDOMEN PELVIS W CONTRAST  LOCATION: [facility]  DATE/TIME: 4/19/2023 9:36 PM CDT    INDICATION: RUQ pain  COMPARISON: Ultrasound 04/19/2023 and CT abdomen and pelvis 04/16/2021  TECHNIQUE: CT scan of the abdomen and pelvis was performed following injection of IV contrast. Multiplanar reformats were obtained. Dose reduction techniques were used.  CONTRAST: 104 mL Isovue 370    FINDINGS:   LOWER CHEST: Normal.    HEPATOBILIARY: Diffuse fatty infiltration of the liver. Stones in the gallbladder neck.    PANCREAS: Normal.    SPLEEN: Normal.    ADRENAL GLANDS: Normal.    KIDNEYS/BLADDER: 2 mm calyceal tip nonobstructing stone lower pole right kidney.    BOWEL: Normal appendix. No evidence for bowel obstruction. No bowel wall thickening or inflammatory changes.    LYMPH NODES: Normal.    VASCULATURE: Incidental retroaortic left renal vein.    PELVIC ORGANS: Mild prostatic hypertrophy.    MUSCULOSKELETAL: Normal.      Impression    IMPRESSION:   1.  Gallstone. No inflammatory changes. Please refer to abdominal ultrasound report from earlier today for further discussion.  2.  Fatty liver.  3.  Tiny nonobstructing stone right kidney.  4.  Prostatic hypertrophy.   Comprehensive metabolic panel     Status: Normal   Result Value Ref Range    Sodium 136 136 - 145 mmol/L    Potassium 4.6 3.4 - 5.3 mmol/L    Chloride 102 98 - 107 mmol/L    Carbon Dioxide (CO2) 23 22 - 29 mmol/L    Anion Gap 11 7 - 15 mmol/L    Urea Nitrogen 13.0 6.0 - 20.0 mg/dL    Creatinine 0.77 0.67 - 1.17 mg/dL    Calcium 9.3 8.6 - 10.0 mg/dL    Glucose 97 70 - 99 mg/dL    Alkaline Phosphatase 60 40 - 129 U/L    AST 43 10 - 50 U/L    ALT 33 10 - 50 U/L    Protein Total 6.7 6.4 - 8.3  g/dL    Albumin 4.4 3.5 - 5.2 g/dL    Bilirubin Total 0.3 <=1.2 mg/dL    GFR Estimate >90 >60 mL/min/1.73m2   Lipase     Status: Normal   Result Value Ref Range    Lipase 52 13 - 60 U/L   CBC with platelets and differential     Status: Abnormal   Result Value Ref Range    WBC Count 6.1 4.0 - 11.0 10e3/uL    RBC Count 3.96 (L) 4.40 - 5.90 10e6/uL    Hemoglobin 10.0 (L) 13.3 - 17.7 g/dL    Hematocrit 32.9 (L) 40.0 - 53.0 %    MCV 83 78 - 100 fL    MCH 25.3 (L) 26.5 - 33.0 pg    MCHC 30.4 (L) 31.5 - 36.5 g/dL    RDW 14.6 10.0 - 15.0 %    Platelet Count 181 150 - 450 10e3/uL    % Neutrophils 65 %    % Lymphocytes 20 %    % Monocytes 12 %    % Eosinophils 2 %    % Basophils 1 %    % Immature Granulocytes 0 %    NRBCs per 100 WBC 0 <1 /100    Absolute Neutrophils 3.9 1.6 - 8.3 10e3/uL    Absolute Lymphocytes 1.2 0.8 - 5.3 10e3/uL    Absolute Monocytes 0.8 0.0 - 1.3 10e3/uL    Absolute Eosinophils 0.1 0.0 - 0.7 10e3/uL    Absolute Basophils 0.0 0.0 - 0.2 10e3/uL    Absolute Immature Granulocytes 0.0 <=0.4 10e3/uL    Absolute NRBCs 0.0 10e3/uL   CBC with platelets differential     Status: Abnormal    Narrative    The following orders were created for panel order CBC with platelets differential.  Procedure                               Abnormality         Status                     ---------                               -----------         ------                     CBC with platelets and d...[[id number]]  Abnormal            Final result                 Please view results for these tests on the individual orders.     Medications   ketorolac (TORADOL) injection 30 mg (30 mg Intravenous $Given 4/19/23 1812)   HYDROmorphone (PF) (DILAUDID) injection 0.5 mg (0.5 mg Intravenous $Given 4/19/23 1811)   0.9% sodium chloride BOLUS (0 mLs Intravenous Stopped 4/19/23 1946)   iopamidol (ISOVUE-370) solution 100 mL (104 mLs Intravenous $Given 4/19/23 5960)   sodium chloride 0.9 % bag 100mL for CT scan flush use (66 mLs Intravenous  $Given 4/19/23 0976)     Labs Ordered and Resulted from Time of ED Arrival to Time of ED Departure   CBC WITH PLATELETS AND DIFFERENTIAL - Abnormal       Result Value    WBC Count 6.1      RBC Count 3.96 (*)     Hemoglobin 10.0 (*)     Hematocrit 32.9 (*)     MCV 83      MCH 25.3 (*)     MCHC 30.4 (*)     RDW 14.6      Platelet Count 181      % Neutrophils 65      % Lymphocytes 20      % Monocytes 12      % Eosinophils 2      % Basophils 1      % Immature Granulocytes 0      NRBCs per 100 WBC 0      Absolute Neutrophils 3.9      Absolute Lymphocytes 1.2      Absolute Monocytes 0.8      Absolute Eosinophils 0.1      Absolute Basophils 0.0      Absolute Immature Granulocytes 0.0      Absolute NRBCs 0.0     COMPREHENSIVE METABOLIC PANEL - Normal    Sodium 136      Potassium 4.6      Chloride 102      Carbon Dioxide (CO2) 23      Anion Gap 11      Urea Nitrogen 13.0      Creatinine 0.77      Calcium 9.3      Glucose 97      Alkaline Phosphatase 60      AST 43      ALT 33      Protein Total 6.7      Albumin 4.4      Bilirubin Total 0.3      GFR Estimate >90     LIPASE - Normal    Lipase 52       CT Abdomen Pelvis w Contrast   Final Result   IMPRESSION:    1.  Gallstone. No inflammatory changes. Please refer to abdominal ultrasound report from earlier today for further discussion.   2.  Fatty liver.   3.  Tiny nonobstructing stone right kidney.   4.  Prostatic hypertrophy.      US Abdomen Limited (RUQ)   Final Result   IMPRESSION:      1.  Cholelithiasis. No evidence of acute cholecystitis.      2.  No biliary ductal dilation.             Critical care was not performed.     Medical Decision Making  The patient's presentation was of moderate complexity (an acute illness with systemic symptoms).    The patient's evaluation involved:  ordering and/or review of 3+ test(s) in this encounter (see separate area of note for details)    The patient's management necessitated moderate risk (prescription drug management including  medications given in the ED) and high risk (a decision regarding emergency major procedure (Patient was evaluated for possible surgery for gallbladder inflammation and or cholelithiasis patient will follow-up on an outpatient basis it is not interested in addressing at this time.)).      Assessment & Plan    Manfred Young is a 52 year old male with past medical history of nephrolithiasis and incidental finding of gallstones on CT who presents with acute, progressive RUQ pain x1 day.  No associated nausea or vomiting.  Exam notable for RUQ tenderness and positive Garcia sign.  Ultrasound of the RUQ demonstrated cholelithiasis without evidence of acute cholecystitis, no biliary ductal dilation.  CT abdomen/pelvis confirmed multiple gallstones in neck of the gallbladder without inflammatory changes.  CT also noted incidental findings of fatty liver, nonobstructing right kidney stone, and mild prostatic hypertrophy.  Labs notable for mild anemia with hemoglobin of 10.  Patient presentation and work-up consistent with cholelithiasis.  There is low suspicion for acute cholecystitis at this time given negative CT findings and normal white count.  While in the ED, patient treated with IV fluids, Dilaudid, and Toradol.  Pain has resolved with medication, and patient is safe for discharge.  He was discharged with a short course of oxycodone for pain management as well as a referral for surgical evaluation.  Recommended also following up with his primary care provider in the next week to evaluate the low hemoglobin.  Discussed following a low-fat diet.  Return to the ER if he develops increased pain or fevers.    I have reviewed the nursing notes. I have reviewed the findings, diagnosis, plan and need for follow up with the patient.    This data collected with the Medical Student working in the Emergency Department.  Patient was seen and evaluated by myself and I repeated the history and physical exam with the patient.   The plan of care was discussed with them.  The key portions of the note including the entire assessment and plan reflect my documentation.        Final diagnoses:   Calculus of gallbladder without cholecystitis without obstruction     Austin Sánchez MD, MS3 on 4/19/2023 at 5:49 PM      Austin Sánchez  [facility] EMERGENCY DEPARTMENT  4/19/2023     Austin Sánchez MD  04/21/23 1203

## 2023-04-20 ENCOUNTER — TELEPHONE (OUTPATIENT)
Dept: SURGERY | Facility: CLINIC | Age: 53
End: 2023-04-20
Payer: COMMERCIAL

## 2023-04-20 NOTE — DISCHARGE INSTRUCTIONS
Discharge to home continue with low-fat diet follow-up with primary care MD as well as referral for surgery evaluation.  Return if increased pain or fevers.

## 2023-04-20 NOTE — TELEPHONE ENCOUNTER
"LVM, sent mychart      Per a referral placed by Dr. Sánchez for \"Calculus of gallbladder without cholecystitis without obstruction [K80.20]\", schedule an appt with either Dr. Rodriguez, Dr. Ornelas, Dr. Roberts, Dr. Ac, Dr. Burrell, Dr. Garber, or Dr. Riojas. Next available \"[facility] New\" visit type. Link Referral  "

## 2023-04-20 NOTE — TELEPHONE ENCOUNTER
REFERRAL INFORMATION:    Referring Provider:  Dr. Austin Sánchez    Referring Clinic:  [facility] EMER[facility]    Reason for Visit/Diagnosis:  Calculus of gallbladder       FUTURE VISIT INFORMATION:    Appointment Date: 04-21-23    Appointment Time: @ 8am      NOTES RECORD STATUS  DETAILS   OFFICE NOTE from Referring Provider Internal 04-19-23 Dr. Austin Sánchez   OFFICE NOTE from Other Specialists N/A    HOSPITAL DISCHARGE SUMMARY/ ED VISITS  N/A    OPERATIVE REPORT N/A    ENDOSCOPY (EGD)  N/A    PERTINENT LABS Internal C-diff: 04-19-23   PATHOLOGY REPORTS (RELATED) N/A    IMAGING (CT, MRI, US, XR)  Internal CT abd: 04-19-23  US abd:04-19-23  XR chest: 04-16-21, 01-15-20

## 2023-04-21 ENCOUNTER — PRE VISIT (OUTPATIENT)
Dept: SURGERY | Facility: CLINIC | Age: 53
End: 2023-04-21

## 2023-04-21 ENCOUNTER — OFFICE VISIT (OUTPATIENT)
Dept: SURGERY | Facility: CLINIC | Age: 53
End: 2023-04-21
Attending: FAMILY MEDICINE
Payer: COMMERCIAL

## 2023-04-21 VITALS
WEIGHT: 211.5 LBS | BODY MASS INDEX: 28.03 KG/M2 | HEIGHT: 73 IN | SYSTOLIC BLOOD PRESSURE: 112 MMHG | OXYGEN SATURATION: 100 % | DIASTOLIC BLOOD PRESSURE: 69 MMHG | HEART RATE: 64 BPM

## 2023-04-21 DIAGNOSIS — K80.20 CALCULUS OF GALLBLADDER WITHOUT CHOLECYSTITIS WITHOUT OBSTRUCTION: ICD-10-CM

## 2023-04-21 PROCEDURE — 99204 OFFICE O/P NEW MOD 45 MIN: CPT | Performed by: SURGERY

## 2023-04-21 ASSESSMENT — PAIN SCALES - GENERAL: PAINLEVEL: NO PAIN (0)

## 2023-04-21 NOTE — PROGRESS NOTES
General Surgery Clinic Note - New Patient Visit    NAME: Manfred Young,  52 year old male    PCP: No Ref-Primary, Physician  MRN:   [id number]      Ph#: None  Date: Apr 21, 2023    Chief Complaint: gallstone    History of Present Illness: Manfred Young is a 52 year old male who presents to the clinic after a single attack of RUQ pain that sent him to the ED.  While there workup was generally unremarkable.  Labs were normal, including LFTs.  On CT and US there was a single mobile 9 mm stone near the GB neck.  His pain resolved with toradol and he was discharged from the ED.  He reports no other similar attacks in his life, although he had acid reflux symptoms that resolved with omeprazole.    Past Medical History: History in Epic reviewed with the patient:  Past Medical History:   Diagnosis Date     NO ACTIVE PROBLEMS        Past Surgical History: History in Epic reviewed with the patient:  Past Surgical History:   Procedure Laterality Date     SURGICAL HISTORY OF -       detached retina     SURGICAL HISTORY OF -       arthroscopic knee surgery-Right x1, left x1, plica repair       Family History: History in Epic reviewed with the patient:  Family History   Problem Relation Age of Onset     Cerebrovascular Disease Maternal Grandmother      Osteoporosis Mother      Lipids Father        Social History: History in Epic reviewed with the patient:  Social History     Socioeconomic History     Marital status:      Spouse name: Not on file     Number of children: Not on file     Years of education: Not on file     Highest education level: Not on file   Occupational History     Occupation: Build furniture     Employer: SELF   Tobacco Use     Smoking status: Never     Smokeless tobacco: Never   Vaping Use     Vaping status: Never Used   Substance and Sexual Activity     Alcohol use: Yes     Drug use: No     Sexual activity: Yes     Partners: Female   Other Topics Concern     Parent/sibling w/ CABG, MI or  "angioplasty before 65F 55M? Not Asked   Social History Narrative     Not on file     Social Determinants of Health     Financial Resource Strain: Not on file   Food Insecurity: Not on file   Transportation Needs: Not on file   Physical Activity: Not on file   Stress: Not on file   Social Connections: Not on file   Intimate Partner Violence: Not on file   Housing Stability: Not on file       Allergies:     Allergies   Allergen Reactions     Bee Venom Swelling     Gets hot     Chloraprep One Step Itching     Has a sensitivity     Penicillins Diarrhea     Wasp Venom Protein Other (See Comments)     High fever after being stung by bee.       Outpatient Medications:  Outpatient Encounter Medications as of 4/21/2023   Medication Sig Dispense Refill     omeprazole (PRILOSEC) 20 MG DR capsule Take 1 capsule (20 mg) by mouth daily 60 capsule 0     tadalafil (CIALIS) 5 MG tablet Take 1 tablet by mouth daily       oxyCODONE-acetaminophen (PERCOCET) 5-325 MG tablet Take 1 tablet by mouth every 6 hours as needed for severe pain (Patient not taking: Reported on 4/21/2023) 12 tablet 0     No facility-administered encounter medications on file as of 4/21/2023.       ROS: 10 systems reviewed and all are negative except as above.    EXAM:  /69 (BP Location: Left arm, Patient Position: Sitting, Cuff Size: Adult Large)   Pulse 64   Ht 1.854 m (6' 1\")   Wt 95.9 kg (211 lb 8 oz)   SpO2 100%   BMI 27.90 kg/m    Psych: Normal affect  Neuro: No gross focal deficits noted  Head:Normocephalic, atraumatic  Eyes: Non icteric  Neck:supple  Heart: Regular rate and rhythm  Lungs: non-labored, quiet respiration  Abdomen: soft  Extremities: No obvious deformities    Imaging Data (I have personally reviewed the following reports):  Recent Results (from the past 744 hour(s))   US Abdomen Limited (RUQ)    Narrative    EXAM: US ABDOMEN LIMITED  LOCATION: [facility]  DATE/TIME: 4/19/2023 6:46 PM " CDT    INDICATION: Right upper quadrant abdominal pain.  COMPARISON: CTA 04/16/2021.  TECHNIQUE: Limited abdominal ultrasound.    FINDINGS:    GALLBLADDER: Single mobile 9 mm calculus within the gallbladder neck. No gallbladder wall thickening or pericholecystic fluid. Sonographic Garcia's sign is negative.    BILE DUCTS: No biliary dilatation. The common duct measures 4 mm.    LIVER: Normal parenchyma with smooth contour. No focal mass.    RIGHT KIDNEY: No hydronephrosis.    PANCREAS: Visualized portions of the neck and proximal body are normal. Remainder is obscured by bowel gas.    No ascites.      Impression    IMPRESSION:    1.  Cholelithiasis. No evidence of acute cholecystitis.    2.  No biliary ductal dilation.   CT Abdomen Pelvis w Contrast    Narrative    EXAM: CT ABDOMEN PELVIS W CONTRAST  LOCATION: [facility]  DATE/TIME: 4/19/2023 9:36 PM CDT    INDICATION: RUQ pain  COMPARISON: Ultrasound 04/19/2023 and CT abdomen and pelvis 04/16/2021  TECHNIQUE: CT scan of the abdomen and pelvis was performed following injection of IV contrast. Multiplanar reformats were obtained. Dose reduction techniques were used.  CONTRAST: 104 mL Isovue 370    FINDINGS:   LOWER CHEST: Normal.    HEPATOBILIARY: Diffuse fatty infiltration of the liver. Stones in the gallbladder neck.    PANCREAS: Normal.    SPLEEN: Normal.    ADRENAL GLANDS: Normal.    KIDNEYS/BLADDER: 2 mm calyceal tip nonobstructing stone lower pole right kidney.    BOWEL: Normal appendix. No evidence for bowel obstruction. No bowel wall thickening or inflammatory changes.    LYMPH NODES: Normal.    VASCULATURE: Incidental retroaortic left renal vein.    PELVIC ORGANS: Mild prostatic hypertrophy.    MUSCULOSKELETAL: Normal.      Impression    IMPRESSION:   1.  Gallstone. No inflammatory changes. Please refer to abdominal ultrasound report from earlier today for further discussion.  2.  Fatty liver.  3.  Tiny  nonobstructing stone right kidney.  4.  Prostatic hypertrophy.       A/P: Manfred Young is a 52 year old male with a gallstone and a possible gallbladder attack.  He does not wish to pursue surgery if not needed. We will order a HIDA and proceed accordingly.      Noemy Rodriguez MD [facility]  Professor of Surgery  Pager [phone]

## 2023-04-21 NOTE — LETTER
4/21/2023       RE: Manfred Young  [address]  [facility] [address]       Dear Colleague,    Thank you for referring your patient, Manfred Young, to the [facility] GENERAL SURGERY CLINIC [place] at [facility]. Please see a copy of my visit note below.    General Surgery Clinic Note - New Patient Visit    NAME: Manfred Young,  52 year old male    PCP: No Ref-Primary, Physician  MRN:   [id number]      Ph#: None  Date: Apr 21, 2023    Chief Complaint: gallstone    History of Present Illness: Manfred Yougn is a 52 year old male who presents to the clinic after a single attack of RUQ pain that sent him to the ED.  While there workup was generally unremarkable.  Labs were normal, including LFTs.  On CT and US there was a single mobile 9 mm stone near the GB neck.  His pain resolved with toradol and he was discharged from the ED.  He reports no other similar attacks in his life, although he had acid reflux symptoms that resolved with omeprazole.    Past Medical History: History in Epic reviewed with the patient:  Past Medical History:   Diagnosis Date    NO ACTIVE PROBLEMS        Past Surgical History: History in Epic reviewed with the patient:  Past Surgical History:   Procedure Laterality Date    SURGICAL HISTORY OF -       detached retina    SURGICAL HISTORY OF -       arthroscopic knee surgery-Right x1, left x1, plica repair       Family History: History in Epic reviewed with the patient:  Family History   Problem Relation Age of Onset    Cerebrovascular Disease Maternal Grandmother     Osteoporosis Mother     Lipids Father        Social History: History in Epic reviewed with the patient:  Social History     Socioeconomic History    Marital status:      Spouse name: Not on file    Number of children: Not on file    Years of education: Not on file    Highest education level: Not on file   Occupational History    Occupation: Build  "furniture     Employer: SELF   Tobacco Use    Smoking status: Never    Smokeless tobacco: Never   Vaping Use    Vaping status: Never Used   Substance and Sexual Activity    Alcohol use: Yes    Drug use: No    Sexual activity: Yes     Partners: Female   Other Topics Concern    Parent/sibling w/ CABG, MI or angioplasty before 65F 55M? Not Asked   Social History Narrative    Not on file     Social Determinants of Health     Financial Resource Strain: Not on file   Food Insecurity: Not on file   Transportation Needs: Not on file   Physical Activity: Not on file   Stress: Not on file   Social Connections: Not on file   Intimate Partner Violence: Not on file   Housing Stability: Not on file       Allergies:     Allergies   Allergen Reactions    Bee Venom Swelling     Gets hot    Chloraprep One Step Itching     Has a sensitivity    Penicillins Diarrhea    Wasp Venom Protein Other (See Comments)     High fever after being stung by bee.       Outpatient Medications:  Outpatient Encounter Medications as of 4/21/2023   Medication Sig Dispense Refill    omeprazole (PRILOSEC) 20 MG DR capsule Take 1 capsule (20 mg) by mouth daily 60 capsule 0    tadalafil (CIALIS) 5 MG tablet Take 1 tablet by mouth daily      oxyCODONE-acetaminophen (PERCOCET) 5-325 MG tablet Take 1 tablet by mouth every 6 hours as needed for severe pain (Patient not taking: Reported on 4/21/2023) 12 tablet 0     No facility-administered encounter medications on file as of 4/21/2023.       ROS: 10 systems reviewed and all are negative except as above.    EXAM:  /69 (BP Location: Left arm, Patient Position: Sitting, Cuff Size: Adult Large)   Pulse 64   Ht 1.854 m (6' 1\")   Wt 95.9 kg (211 lb 8 oz)   SpO2 100%   BMI 27.90 kg/m    Psych: Normal affect  Neuro: No gross focal deficits noted  Head:Normocephalic, atraumatic  Eyes: Non icteric  Neck:supple  Heart: Regular rate and rhythm  Lungs: non-labored, quiet respiration  Abdomen: soft  Extremities: No " obvious deformities    Imaging Data (I have personally reviewed the following reports):  Recent Results (from the past 744 hour(s))   US Abdomen Limited (RUQ)    Narrative    EXAM: US ABDOMEN LIMITED  LOCATION: [facility]  DATE/TIME: 4/19/2023 6:46 PM CDT    INDICATION: Right upper quadrant abdominal pain.  COMPARISON: CTA 04/16/2021.  TECHNIQUE: Limited abdominal ultrasound.    FINDINGS:    GALLBLADDER: Single mobile 9 mm calculus within the gallbladder neck. No gallbladder wall thickening or pericholecystic fluid. Sonographic Garcia's sign is negative.    BILE DUCTS: No biliary dilatation. The common duct measures 4 mm.    LIVER: Normal parenchyma with smooth contour. No focal mass.    RIGHT KIDNEY: No hydronephrosis.    PANCREAS: Visualized portions of the neck and proximal body are normal. Remainder is obscured by bowel gas.    No ascites.      Impression    IMPRESSION:    1.  Cholelithiasis. No evidence of acute cholecystitis.    2.  No biliary ductal dilation.   CT Abdomen Pelvis w Contrast    Narrative    EXAM: CT ABDOMEN PELVIS W CONTRAST  LOCATION: [facility]  DATE/TIME: 4/19/2023 9:36 PM CDT    INDICATION: RUQ pain  COMPARISON: Ultrasound 04/19/2023 and CT abdomen and pelvis 04/16/2021  TECHNIQUE: CT scan of the abdomen and pelvis was performed following injection of IV contrast. Multiplanar reformats were obtained. Dose reduction techniques were used.  CONTRAST: 104 mL Isovue 370    FINDINGS:   LOWER CHEST: Normal.    HEPATOBILIARY: Diffuse fatty infiltration of the liver. Stones in the gallbladder neck.    PANCREAS: Normal.    SPLEEN: Normal.    ADRENAL GLANDS: Normal.    KIDNEYS/BLADDER: 2 mm calyceal tip nonobstructing stone lower pole right kidney.    BOWEL: Normal appendix. No evidence for bowel obstruction. No bowel wall thickening or inflammatory changes.    LYMPH NODES: Normal.    VASCULATURE: Incidental  retroaortic left renal vein.    PELVIC ORGANS: Mild prostatic hypertrophy.    MUSCULOSKELETAL: Normal.      Impression    IMPRESSION:   1.  Gallstone. No inflammatory changes. Please refer to abdominal ultrasound report from earlier today for further discussion.  2.  Fatty liver.  3.  Tiny nonobstructing stone right kidney.  4.  Prostatic hypertrophy.       A/P: Manfred Young is a 52 year old male with a gallstone and a possible gallbladder attack.  He does not wish to pursue surgery if not needed. We will order a HIDA and proceed accordingly.            Again, thank you for allowing me to participate in the care of your patient.      Sincerely,    Noemy Rodriguez MD

## 2023-04-21 NOTE — PATIENT INSTRUCTIONS
You met with Dr. Noemy Rodriguez.      Today's visit instructions:    Dr. Rodriguez recommends you have a HIDA scan. We will call you with those results       If you have questions please contact Radha RN or Regina RN during regular clinic hours, Monday through Friday 7:30 AM - 4:00 PM, or you can contact us via [employer] at anytime.       If you have urgent needs after-hours, weekends, or holidays please call the hospital at [phone] and ask to speak with our on-call General Surgery Team.    Appointment [phone]  Nurse Advice (Radha or Regina): [phone]   Surgery Scheduler (Carla): [phone]  Fax: [phone]

## 2023-04-21 NOTE — NURSING NOTE
"Chief Complaint   Patient presents with     New Patient     Calculus of gallbladder       Vitals:    04/21/23 0756   BP: 112/69   BP Location: Left arm   Patient Position: Sitting   Cuff Size: Adult Large   Pulse: 64   SpO2: 100%   Weight: 95.9 kg (211 lb 8 oz)   Height: 1.854 m (6' 1\")       Body mass index is 27.9 kg/m .                          Emir Santos, EMT    "

## 2023-04-25 ENCOUNTER — HOSPITAL ENCOUNTER (OUTPATIENT)
Dept: NUCLEAR MEDICINE | Facility: CLINIC | Age: 53
Setting detail: NUCLEAR MEDICINE
Discharge: HOME | End: 2023-04-25
Attending: SURGERY | Admitting: SURGERY
Payer: COMMERCIAL

## 2023-04-25 DIAGNOSIS — K80.20 CALCULUS OF GALLBLADDER WITHOUT CHOLECYSTITIS WITHOUT OBSTRUCTION: ICD-10-CM

## 2023-04-25 PROCEDURE — 343N000001 HC RX 343: Performed by: SURGERY

## 2023-04-25 PROCEDURE — A9537 TC99M MEBROFENIN: HCPCS | Performed by: SURGERY

## 2023-04-25 PROCEDURE — 78227 HEPATOBIL SYST IMAGE W/DRUG: CPT

## 2023-04-25 PROCEDURE — 78227 HEPATOBIL SYST IMAGE W/DRUG: CPT | Mod: 26

## 2023-04-25 RX ORDER — KIT FOR THE PREPARATION OF TECHNETIUM TC 99M MEBROFENIN 45 MG/10ML
1-7.2 INJECTION, POWDER, LYOPHILIZED, FOR SOLUTION INTRAVENOUS ONCE
Status: COMPLETED | OUTPATIENT
Start: 2023-04-25 | End: 2023-04-25

## 2023-04-25 RX ADMIN — MEBROFENIN 5.5 MILLICURIE: 45 INJECTION, POWDER, LYOPHILIZED, FOR SOLUTION INTRAVENOUS at 07:41

## 2023-04-26 ENCOUNTER — PATIENT OUTREACH (OUTPATIENT)
Dept: SURGERY | Facility: CLINIC | Age: 53
End: 2023-04-26
Payer: COMMERCIAL

## 2023-04-26 NOTE — PROGRESS NOTES
HIDA scan ordered by Dr. Rodriguez resulted. Gallbladder EF 90%, no other abnormalities found. Message sent to Dr. Rodriguez to review. Dr. Rodriguez states no surgery is necessary at this time. Message sent via MessageCast to patient.

## 2023-05-02 DIAGNOSIS — K21.9 GASTROESOPHAGEAL REFLUX DISEASE WITHOUT ESOPHAGITIS: ICD-10-CM

## 2023-05-25 DIAGNOSIS — K21.9 GASTROESOPHAGEAL REFLUX DISEASE WITHOUT ESOPHAGITIS: ICD-10-CM

## 2023-05-25 NOTE — TELEPHONE ENCOUNTER
Prescription approved per [facility] Refill Protocol.    Su Celis, BSN RN  [facility]

## 2023-08-01 DIAGNOSIS — K21.9 GASTROESOPHAGEAL REFLUX DISEASE WITHOUT ESOPHAGITIS: ICD-10-CM

## 2023-08-05 ENCOUNTER — MYC REFILL (OUTPATIENT)
Dept: FAMILY MEDICINE | Facility: CLINIC | Age: 53
End: 2023-08-05
Payer: COMMERCIAL

## 2023-08-05 DIAGNOSIS — K21.9 GASTROESOPHAGEAL REFLUX DISEASE WITHOUT ESOPHAGITIS: ICD-10-CM

## 2023-08-07 ENCOUNTER — HOSPITAL ENCOUNTER (OUTPATIENT)
Facility: CLINIC | Age: 53
Setting detail: OBSERVATION
Discharge: HOME | End: 2023-08-07
Attending: EMERGENCY MEDICINE | Admitting: EMERGENCY MEDICINE
Payer: COMMERCIAL

## 2023-08-07 ENCOUNTER — HOSPITAL ENCOUNTER (OUTPATIENT)
Facility: CLINIC | Age: 53
Discharge: HOME | End: 2023-08-07
Attending: SURGERY | Admitting: SURGERY
Payer: COMMERCIAL

## 2023-08-07 ENCOUNTER — APPOINTMENT (OUTPATIENT)
Dept: ULTRASOUND IMAGING | Facility: CLINIC | Age: 53
End: 2023-08-07
Attending: EMERGENCY MEDICINE
Payer: COMMERCIAL

## 2023-08-07 ENCOUNTER — ANESTHESIA EVENT (OUTPATIENT)
Dept: SURGERY | Facility: CLINIC | Age: 53
End: 2023-08-07
Payer: COMMERCIAL

## 2023-08-07 ENCOUNTER — APPOINTMENT (OUTPATIENT)
Dept: MRI IMAGING | Facility: CLINIC | Age: 53
End: 2023-08-07
Attending: EMERGENCY MEDICINE
Payer: COMMERCIAL

## 2023-08-07 ENCOUNTER — ANESTHESIA (OUTPATIENT)
Dept: SURGERY | Facility: CLINIC | Age: 53
End: 2023-08-07
Payer: COMMERCIAL

## 2023-08-07 VITALS
TEMPERATURE: 98.7 F | SYSTOLIC BLOOD PRESSURE: 129 MMHG | OXYGEN SATURATION: 100 % | HEIGHT: 73 IN | HEART RATE: 52 BPM | WEIGHT: 205 LBS | DIASTOLIC BLOOD PRESSURE: 87 MMHG | BODY MASS INDEX: 27.17 KG/M2 | RESPIRATION RATE: 16 BRPM

## 2023-08-07 VITALS
SYSTOLIC BLOOD PRESSURE: 141 MMHG | TEMPERATURE: 98.6 F | RESPIRATION RATE: 16 BRPM | HEART RATE: 72 BPM | DIASTOLIC BLOOD PRESSURE: 85 MMHG | OXYGEN SATURATION: 99 %

## 2023-08-07 DIAGNOSIS — K80.20 SYMPTOMATIC CHOLELITHIASIS: ICD-10-CM

## 2023-08-07 DIAGNOSIS — Z90.49 S/P LAPAROSCOPIC CHOLECYSTECTOMY: Primary | ICD-10-CM

## 2023-08-07 DIAGNOSIS — G89.18 POSTOPERATIVE PAIN: ICD-10-CM

## 2023-08-07 LAB
ALBUMIN SERPL BCG-MCNC: 4.3 G/DL (ref 3.5–5.2)
ALBUMIN UR-MCNC: NEGATIVE MG/DL
ALP SERPL-CCNC: 56 U/L (ref 40–129)
ALT SERPL W P-5'-P-CCNC: 19 U/L (ref 0–70)
ANION GAP SERPL CALCULATED.3IONS-SCNC: 11 MMOL/L (ref 7–15)
APPEARANCE UR: CLEAR
AST SERPL W P-5'-P-CCNC: 17 U/L (ref 0–45)
BASOPHILS # BLD AUTO: 0.1 10E3/UL (ref 0–0.2)
BASOPHILS NFR BLD AUTO: 1 %
BILIRUB SERPL-MCNC: 0.3 MG/DL
BILIRUB UR QL STRIP: NEGATIVE
BUN SERPL-MCNC: 11 MG/DL (ref 6–20)
CALCIUM SERPL-MCNC: 9.1 MG/DL (ref 8.6–10)
CHLORIDE SERPL-SCNC: 106 MMOL/L (ref 98–107)
COLOR UR AUTO: ABNORMAL
CREAT SERPL-MCNC: 0.89 MG/DL (ref 0.67–1.17)
DEPRECATED HCO3 PLAS-SCNC: 24 MMOL/L (ref 22–29)
EOSINOPHIL # BLD AUTO: 0.2 10E3/UL (ref 0–0.7)
EOSINOPHIL NFR BLD AUTO: 4 %
ERYTHROCYTE [DISTWIDTH] IN BLOOD BY AUTOMATED COUNT: 15.4 % (ref 10–15)
GFR SERPL CREATININE-BSD FRML MDRD: >90 ML/MIN/1.73M2
GLUCOSE SERPL-MCNC: 113 MG/DL (ref 70–99)
GLUCOSE UR STRIP-MCNC: NEGATIVE MG/DL
HCT VFR BLD AUTO: 39.5 % (ref 40–53)
HGB BLD-MCNC: 12.3 G/DL (ref 13.3–17.7)
HGB UR QL STRIP: NEGATIVE
IMM GRANULOCYTES # BLD: 0 10E3/UL
IMM GRANULOCYTES NFR BLD: 0 %
KETONES UR STRIP-MCNC: NEGATIVE MG/DL
LEUKOCYTE ESTERASE UR QL STRIP: NEGATIVE
LIPASE SERPL-CCNC: 48 U/L (ref 13–60)
LYMPHOCYTES # BLD AUTO: 1.6 10E3/UL (ref 0.8–5.3)
LYMPHOCYTES NFR BLD AUTO: 32 %
MCH RBC QN AUTO: 24.5 PG (ref 26.5–33)
MCHC RBC AUTO-ENTMCNC: 31.1 G/DL (ref 31.5–36.5)
MCV RBC AUTO: 79 FL (ref 78–100)
MONOCYTES # BLD AUTO: 0.6 10E3/UL (ref 0–1.3)
MONOCYTES NFR BLD AUTO: 13 %
MUCOUS THREADS #/AREA URNS LPF: PRESENT /LPF
NEUTROPHILS # BLD AUTO: 2.4 10E3/UL (ref 1.6–8.3)
NEUTROPHILS NFR BLD AUTO: 50 %
NITRATE UR QL: NEGATIVE
NRBC # BLD AUTO: 0 10E3/UL
NRBC BLD AUTO-RTO: 0 /100
PH UR STRIP: 6 [PH] (ref 5–7)
PLATELET # BLD AUTO: 230 10E3/UL (ref 150–450)
POTASSIUM SERPL-SCNC: 3.9 MMOL/L (ref 3.4–5.3)
PROT SERPL-MCNC: 6.4 G/DL (ref 6.4–8.3)
RBC # BLD AUTO: 5.03 10E6/UL (ref 4.4–5.9)
RBC URINE: 1 /HPF
SODIUM SERPL-SCNC: 141 MMOL/L (ref 136–145)
SP GR UR STRIP: 1.02 (ref 1–1.03)
UROBILINOGEN UR STRIP-MCNC: NORMAL MG/DL
WBC # BLD AUTO: 4.8 10E3/UL (ref 4–11)
WBC URINE: 1 /HPF

## 2023-08-07 PROCEDURE — 250N000011 HC RX IP 250 OP 636: Mod: JZ | Performed by: NURSE ANESTHETIST, CERTIFIED REGISTERED

## 2023-08-07 PROCEDURE — 76705 ECHO EXAM OF ABDOMEN: CPT

## 2023-08-07 PROCEDURE — 250N000025 HC SEVOFLURANE, PER MIN: Performed by: SURGERY

## 2023-08-07 PROCEDURE — 710N000012 HC RECOVERY PHASE 2, PER MINUTE: Performed by: SURGERY

## 2023-08-07 PROCEDURE — 80053 COMPREHEN METABOLIC PANEL: CPT | Performed by: EMERGENCY MEDICINE

## 2023-08-07 PROCEDURE — 83690 ASSAY OF LIPASE: CPT | Performed by: EMERGENCY MEDICINE

## 2023-08-07 PROCEDURE — 255N000002 HC RX 255 OP 636: Performed by: EMERGENCY MEDICINE

## 2023-08-07 PROCEDURE — 96361 HYDRATE IV INFUSION ADD-ON: CPT | Performed by: EMERGENCY MEDICINE

## 2023-08-07 PROCEDURE — 272N000001 HC OR GENERAL SUPPLY STERILE: Performed by: SURGERY

## 2023-08-07 PROCEDURE — 99285 EMERGENCY DEPT VISIT HI MDM: CPT | Mod: 25 | Performed by: EMERGENCY MEDICINE

## 2023-08-07 PROCEDURE — 360N000076 HC SURGERY LEVEL 3, PER MIN: Performed by: SURGERY

## 2023-08-07 PROCEDURE — 250N000009 HC RX 250: Performed by: NURSE ANESTHETIST, CERTIFIED REGISTERED

## 2023-08-07 PROCEDURE — 258N000003 HC RX IP 258 OP 636: Performed by: EMERGENCY MEDICINE

## 2023-08-07 PROCEDURE — 85025 COMPLETE CBC W/AUTO DIFF WBC: CPT | Performed by: EMERGENCY MEDICINE

## 2023-08-07 PROCEDURE — 88304 TISSUE EXAM BY PATHOLOGIST: CPT | Mod: 26 | Performed by: PATHOLOGY

## 2023-08-07 PROCEDURE — 258N000003 HC RX IP 258 OP 636: Performed by: NURSE ANESTHETIST, CERTIFIED REGISTERED

## 2023-08-07 PROCEDURE — G0378 HOSPITAL OBSERVATION PER HR: HCPCS

## 2023-08-07 PROCEDURE — 250N000011 HC RX IP 250 OP 636: Performed by: SURGERY

## 2023-08-07 PROCEDURE — 47562 LAPAROSCOPIC CHOLECYSTECTOMY: CPT | Mod: GC | Performed by: SURGERY

## 2023-08-07 PROCEDURE — 96374 THER/PROPH/DIAG INJ IV PUSH: CPT | Performed by: EMERGENCY MEDICINE

## 2023-08-07 PROCEDURE — 88304 TISSUE EXAM BY PATHOLOGIST: CPT | Mod: TC | Performed by: SURGERY

## 2023-08-07 PROCEDURE — 99285 EMERGENCY DEPT VISIT HI MDM: CPT | Performed by: EMERGENCY MEDICINE

## 2023-08-07 PROCEDURE — 96365 THER/PROPH/DIAG IV INF INIT: CPT

## 2023-08-07 PROCEDURE — A9585 GADOBUTROL INJECTION: HCPCS | Performed by: EMERGENCY MEDICINE

## 2023-08-07 PROCEDURE — 250N000011 HC RX IP 250 OP 636: Mod: JZ | Performed by: EMERGENCY MEDICINE

## 2023-08-07 PROCEDURE — 250N000013 HC RX MED GY IP 250 OP 250 PS 637: Performed by: STUDENT IN AN ORGANIZED HEALTH CARE EDUCATION/TRAINING PROGRAM

## 2023-08-07 PROCEDURE — 250N000011 HC RX IP 250 OP 636: Performed by: EMERGENCY MEDICINE

## 2023-08-07 PROCEDURE — 250N000011 HC RX IP 250 OP 636: Performed by: NURSE ANESTHETIST, CERTIFIED REGISTERED

## 2023-08-07 PROCEDURE — 81001 URINALYSIS AUTO W/SCOPE: CPT | Performed by: EMERGENCY MEDICINE

## 2023-08-07 PROCEDURE — 370N000017 HC ANESTHESIA TECHNICAL FEE, PER MIN: Performed by: SURGERY

## 2023-08-07 PROCEDURE — 250N000011 HC RX IP 250 OP 636: Mod: JZ | Performed by: STUDENT IN AN ORGANIZED HEALTH CARE EDUCATION/TRAINING PROGRAM

## 2023-08-07 PROCEDURE — 710N000010 HC RECOVERY PHASE 1, LEVEL 2, PER MIN: Performed by: SURGERY

## 2023-08-07 PROCEDURE — 36415 COLL VENOUS BLD VENIPUNCTURE: CPT | Performed by: EMERGENCY MEDICINE

## 2023-08-07 PROCEDURE — 999N000141 HC STATISTIC PRE-PROCEDURE NURSING ASSESSMENT: Performed by: SURGERY

## 2023-08-07 PROCEDURE — 74183 MRI ABD W/O CNTR FLWD CNTR: CPT

## 2023-08-07 PROCEDURE — 96375 TX/PRO/DX INJ NEW DRUG ADDON: CPT | Performed by: EMERGENCY MEDICINE

## 2023-08-07 PROCEDURE — 250N000009 HC RX 250: Performed by: SURGERY

## 2023-08-07 RX ORDER — CLINDAMYCIN PHOSPHATE 900 MG/50ML
900 INJECTION, SOLUTION INTRAVENOUS
Status: DISCONTINUED | OUTPATIENT
Start: 2023-08-07 | End: 2023-08-07

## 2023-08-07 RX ORDER — ONDANSETRON 4 MG/1
4 TABLET, ORALLY DISINTEGRATING ORAL EVERY 30 MIN PRN
Status: CANCELLED | OUTPATIENT
Start: 2023-08-07

## 2023-08-07 RX ORDER — ONDANSETRON 2 MG/ML
4 INJECTION INTRAMUSCULAR; INTRAVENOUS ONCE
Status: COMPLETED | OUTPATIENT
Start: 2023-08-07 | End: 2023-08-07

## 2023-08-07 RX ORDER — METHOCARBAMOL 750 MG/1
750 TABLET, FILM COATED ORAL
Status: DISCONTINUED | OUTPATIENT
Start: 2023-08-07 | End: 2023-08-07 | Stop reason: HOSPADM

## 2023-08-07 RX ORDER — HYDROMORPHONE HCL IN WATER/PF 6 MG/30 ML
0.2 PATIENT CONTROLLED ANALGESIA SYRINGE INTRAVENOUS EVERY 5 MIN PRN
Status: CANCELLED | OUTPATIENT
Start: 2023-08-07

## 2023-08-07 RX ORDER — HYDROMORPHONE HCL IN WATER/PF 6 MG/30 ML
0.2 PATIENT CONTROLLED ANALGESIA SYRINGE INTRAVENOUS EVERY 5 MIN PRN
Status: DISCONTINUED | OUTPATIENT
Start: 2023-08-07 | End: 2023-08-07 | Stop reason: HOSPADM

## 2023-08-07 RX ORDER — OXYCODONE HYDROCHLORIDE 5 MG/1
5 TABLET ORAL
Status: COMPLETED | OUTPATIENT
Start: 2023-08-07 | End: 2023-08-07

## 2023-08-07 RX ORDER — ERTAPENEM 1 G/1
1 INJECTION, POWDER, LYOPHILIZED, FOR SOLUTION INTRAMUSCULAR; INTRAVENOUS ONCE
Status: COMPLETED | OUTPATIENT
Start: 2023-08-07 | End: 2023-08-07

## 2023-08-07 RX ORDER — SODIUM CHLORIDE, SODIUM LACTATE, POTASSIUM CHLORIDE, CALCIUM CHLORIDE 600; 310; 30; 20 MG/100ML; MG/100ML; MG/100ML; MG/100ML
INJECTION, SOLUTION INTRAVENOUS CONTINUOUS
Status: DISCONTINUED | OUTPATIENT
Start: 2023-08-07 | End: 2023-08-07 | Stop reason: HOSPADM

## 2023-08-07 RX ORDER — SODIUM CHLORIDE, SODIUM LACTATE, POTASSIUM CHLORIDE, CALCIUM CHLORIDE 600; 310; 30; 20 MG/100ML; MG/100ML; MG/100ML; MG/100ML
INJECTION, SOLUTION INTRAVENOUS CONTINUOUS
Status: CANCELLED | OUTPATIENT
Start: 2023-08-07

## 2023-08-07 RX ORDER — BUPIVACAINE HYDROCHLORIDE 2.5 MG/ML
INJECTION, SOLUTION INFILTRATION; PERINEURAL PRN
Status: DISCONTINUED | OUTPATIENT
Start: 2023-08-07 | End: 2023-08-07 | Stop reason: HOSPADM

## 2023-08-07 RX ORDER — ACETAMINOPHEN 325 MG/1
975 TABLET ORAL ONCE
Status: DISCONTINUED | OUTPATIENT
Start: 2023-08-07 | End: 2023-08-07 | Stop reason: HOSPADM

## 2023-08-07 RX ORDER — OXYCODONE HYDROCHLORIDE 5 MG/1
5 TABLET ORAL
Status: CANCELLED | OUTPATIENT
Start: 2023-08-07

## 2023-08-07 RX ORDER — GLYCOPYRROLATE 0.2 MG/ML
INJECTION, SOLUTION INTRAMUSCULAR; INTRAVENOUS PRN
Status: DISCONTINUED | OUTPATIENT
Start: 2023-08-07 | End: 2023-08-07

## 2023-08-07 RX ORDER — ONDANSETRON 4 MG/1
4 TABLET, ORALLY DISINTEGRATING ORAL
Status: DISCONTINUED | OUTPATIENT
Start: 2023-08-07 | End: 2023-08-07 | Stop reason: HOSPADM

## 2023-08-07 RX ORDER — ACETAMINOPHEN 325 MG/1
650 TABLET ORAL EVERY 4 HOURS PRN
Qty: 50 TABLET | Refills: 0 | Status: SHIPPED | OUTPATIENT
Start: 2023-08-07 | End: 2024-01-09

## 2023-08-07 RX ORDER — FENTANYL CITRATE 50 UG/ML
INJECTION, SOLUTION INTRAMUSCULAR; INTRAVENOUS PRN
Status: DISCONTINUED | OUTPATIENT
Start: 2023-08-07 | End: 2023-08-07

## 2023-08-07 RX ORDER — CLINDAMYCIN PHOSPHATE 900 MG/50ML
900 INJECTION, SOLUTION INTRAVENOUS SEE ADMIN INSTRUCTIONS
Status: DISCONTINUED | OUTPATIENT
Start: 2023-08-07 | End: 2023-08-07 | Stop reason: HOSPADM

## 2023-08-07 RX ORDER — ONDANSETRON 2 MG/ML
4 INJECTION INTRAMUSCULAR; INTRAVENOUS EVERY 30 MIN PRN
Status: CANCELLED | OUTPATIENT
Start: 2023-08-07

## 2023-08-07 RX ORDER — FENTANYL CITRATE 50 UG/ML
50 INJECTION, SOLUTION INTRAMUSCULAR; INTRAVENOUS EVERY 5 MIN PRN
Status: CANCELLED | OUTPATIENT
Start: 2023-08-07

## 2023-08-07 RX ORDER — ONDANSETRON 2 MG/ML
4 INJECTION INTRAMUSCULAR; INTRAVENOUS EVERY 30 MIN PRN
Status: DISCONTINUED | OUTPATIENT
Start: 2023-08-07 | End: 2023-08-07 | Stop reason: HOSPADM

## 2023-08-07 RX ORDER — GADOBUTROL 604.72 MG/ML
9 INJECTION INTRAVENOUS ONCE
Status: COMPLETED | OUTPATIENT
Start: 2023-08-07 | End: 2023-08-07

## 2023-08-07 RX ORDER — OXYCODONE HYDROCHLORIDE 5 MG/1
5-10 TABLET ORAL EVERY 4 HOURS PRN
Qty: 10 TABLET | Refills: 0 | Status: SHIPPED | OUTPATIENT
Start: 2023-08-07 | End: 2024-01-09

## 2023-08-07 RX ORDER — HEPARIN SODIUM 5000 [USP'U]/.5ML
5000 INJECTION, SOLUTION INTRAVENOUS; SUBCUTANEOUS
Status: DISCONTINUED | OUTPATIENT
Start: 2023-08-07 | End: 2023-08-07 | Stop reason: HOSPADM

## 2023-08-07 RX ORDER — MORPHINE SULFATE 4 MG/ML
4 INJECTION, SOLUTION INTRAMUSCULAR; INTRAVENOUS ONCE
Status: COMPLETED | OUTPATIENT
Start: 2023-08-07 | End: 2023-08-07

## 2023-08-07 RX ORDER — SODIUM CHLORIDE, SODIUM LACTATE, POTASSIUM CHLORIDE, CALCIUM CHLORIDE 600; 310; 30; 20 MG/100ML; MG/100ML; MG/100ML; MG/100ML
INJECTION, SOLUTION INTRAVENOUS CONTINUOUS PRN
Status: DISCONTINUED | OUTPATIENT
Start: 2023-08-07 | End: 2023-08-07

## 2023-08-07 RX ORDER — HYDROMORPHONE HCL IN WATER/PF 6 MG/30 ML
0.4 PATIENT CONTROLLED ANALGESIA SYRINGE INTRAVENOUS EVERY 5 MIN PRN
Status: CANCELLED | OUTPATIENT
Start: 2023-08-07

## 2023-08-07 RX ORDER — IBUPROFEN 200 MG
200 TABLET ORAL EVERY 4 HOURS PRN
COMMUNITY
End: 2024-01-09

## 2023-08-07 RX ORDER — DEXAMETHASONE SODIUM PHOSPHATE 4 MG/ML
INJECTION, SOLUTION INTRA-ARTICULAR; INTRALESIONAL; INTRAMUSCULAR; INTRAVENOUS; SOFT TISSUE PRN
Status: DISCONTINUED | OUTPATIENT
Start: 2023-08-07 | End: 2023-08-07

## 2023-08-07 RX ORDER — OXYCODONE HYDROCHLORIDE 5 MG/1
10 TABLET ORAL
Status: CANCELLED | OUTPATIENT
Start: 2023-08-07

## 2023-08-07 RX ORDER — HYDROXYZINE HYDROCHLORIDE 10 MG/1
10 TABLET, FILM COATED ORAL
Status: DISCONTINUED | OUTPATIENT
Start: 2023-08-07 | End: 2023-08-07 | Stop reason: HOSPADM

## 2023-08-07 RX ORDER — CLINDAMYCIN PHOSPHATE 900 MG/50ML
900 INJECTION, SOLUTION INTRAVENOUS
Status: DISCONTINUED | OUTPATIENT
Start: 2023-08-07 | End: 2023-08-07 | Stop reason: HOSPADM

## 2023-08-07 RX ORDER — PROPOFOL 10 MG/ML
INJECTION, EMULSION INTRAVENOUS PRN
Status: DISCONTINUED | OUTPATIENT
Start: 2023-08-07 | End: 2023-08-07

## 2023-08-07 RX ORDER — OXYCODONE HYDROCHLORIDE 5 MG/1
5-10 TABLET ORAL EVERY 4 HOURS PRN
Qty: 10 TABLET | Refills: 0 | Status: CANCELLED | OUTPATIENT
Start: 2023-08-07

## 2023-08-07 RX ORDER — FENTANYL CITRATE 50 UG/ML
25 INJECTION, SOLUTION INTRAMUSCULAR; INTRAVENOUS EVERY 5 MIN PRN
Status: CANCELLED | OUTPATIENT
Start: 2023-08-07

## 2023-08-07 RX ORDER — INDOCYANINE GREEN AND WATER 25 MG
2.5 KIT INJECTION ONCE
Status: DISCONTINUED | OUTPATIENT
Start: 2023-08-07 | End: 2023-08-07 | Stop reason: HOSPADM

## 2023-08-07 RX ORDER — CLINDAMYCIN PHOSPHATE 900 MG/50ML
900 INJECTION, SOLUTION INTRAVENOUS SEE ADMIN INSTRUCTIONS
Status: DISCONTINUED | OUTPATIENT
Start: 2023-08-07 | End: 2023-08-07

## 2023-08-07 RX ORDER — ACETAMINOPHEN 325 MG/1
650 TABLET ORAL
Status: DISCONTINUED | OUTPATIENT
Start: 2023-08-07 | End: 2023-08-07 | Stop reason: HOSPADM

## 2023-08-07 RX ORDER — HYDROMORPHONE HCL IN WATER/PF 6 MG/30 ML
0.4 PATIENT CONTROLLED ANALGESIA SYRINGE INTRAVENOUS EVERY 5 MIN PRN
Status: DISCONTINUED | OUTPATIENT
Start: 2023-08-07 | End: 2023-08-07 | Stop reason: HOSPADM

## 2023-08-07 RX ORDER — FENTANYL CITRATE 50 UG/ML
25 INJECTION, SOLUTION INTRAMUSCULAR; INTRAVENOUS EVERY 5 MIN PRN
Status: DISCONTINUED | OUTPATIENT
Start: 2023-08-07 | End: 2023-08-07 | Stop reason: HOSPADM

## 2023-08-07 RX ORDER — LIDOCAINE HYDROCHLORIDE 20 MG/ML
INJECTION, SOLUTION INFILTRATION; PERINEURAL PRN
Status: DISCONTINUED | OUTPATIENT
Start: 2023-08-07 | End: 2023-08-07

## 2023-08-07 RX ORDER — HEPARIN SODIUM 5000 [USP'U]/.5ML
5000 INJECTION, SOLUTION INTRAVENOUS; SUBCUTANEOUS
Status: COMPLETED | OUTPATIENT
Start: 2023-08-07 | End: 2023-08-07

## 2023-08-07 RX ORDER — LIDOCAINE HYDROCHLORIDE 10 MG/ML
INJECTION, SOLUTION INFILTRATION; PERINEURAL PRN
Status: DISCONTINUED | OUTPATIENT
Start: 2023-08-07 | End: 2023-08-07 | Stop reason: HOSPADM

## 2023-08-07 RX ORDER — OXYCODONE HYDROCHLORIDE 5 MG/1
5-10 TABLET ORAL EVERY 4 HOURS PRN
Qty: 10 TABLET | Refills: 0 | Status: SHIPPED | OUTPATIENT
Start: 2023-08-07 | End: 2023-08-07

## 2023-08-07 RX ORDER — ONDANSETRON 4 MG/1
4 TABLET, ORALLY DISINTEGRATING ORAL EVERY 30 MIN PRN
Status: DISCONTINUED | OUTPATIENT
Start: 2023-08-07 | End: 2023-08-07 | Stop reason: HOSPADM

## 2023-08-07 RX ORDER — AMOXICILLIN 250 MG
1-2 CAPSULE ORAL 2 TIMES DAILY
Qty: 30 TABLET | Refills: 0 | Status: SHIPPED | OUTPATIENT
Start: 2023-08-07 | End: 2024-01-09

## 2023-08-07 RX ORDER — ONDANSETRON 2 MG/ML
INJECTION INTRAMUSCULAR; INTRAVENOUS PRN
Status: DISCONTINUED | OUTPATIENT
Start: 2023-08-07 | End: 2023-08-07

## 2023-08-07 RX ORDER — LIDOCAINE 40 MG/G
CREAM TOPICAL
Status: DISCONTINUED | OUTPATIENT
Start: 2023-08-07 | End: 2023-08-07 | Stop reason: HOSPADM

## 2023-08-07 RX ORDER — FENTANYL CITRATE 50 UG/ML
50 INJECTION, SOLUTION INTRAMUSCULAR; INTRAVENOUS EVERY 5 MIN PRN
Status: DISCONTINUED | OUTPATIENT
Start: 2023-08-07 | End: 2023-08-07 | Stop reason: HOSPADM

## 2023-08-07 RX ADMIN — ONDANSETRON 4 MG: 2 INJECTION INTRAMUSCULAR; INTRAVENOUS at 17:15

## 2023-08-07 RX ADMIN — HYDROMORPHONE HYDROCHLORIDE 0.5 MG: 1 INJECTION, SOLUTION INTRAMUSCULAR; INTRAVENOUS; SUBCUTANEOUS at 17:25

## 2023-08-07 RX ADMIN — FENTANYL CITRATE 50 MCG: 50 INJECTION, SOLUTION INTRAMUSCULAR; INTRAVENOUS at 16:32

## 2023-08-07 RX ADMIN — HYDROMORPHONE HYDROCHLORIDE 1 MG: 1 INJECTION, SOLUTION INTRAMUSCULAR; INTRAVENOUS; SUBCUTANEOUS at 03:53

## 2023-08-07 RX ADMIN — ERTAPENEM SODIUM 1 G: 1 INJECTION, POWDER, LYOPHILIZED, FOR SOLUTION INTRAMUSCULAR; INTRAVENOUS at 10:29

## 2023-08-07 RX ADMIN — GADOBUTROL 9 ML: 604.72 INJECTION INTRAVENOUS at 06:55

## 2023-08-07 RX ADMIN — DEXTROSE AND SODIUM CHLORIDE: 5; 450 INJECTION, SOLUTION INTRAVENOUS at 10:29

## 2023-08-07 RX ADMIN — HEPARIN SODIUM 5000 UNITS: 5000 INJECTION, SOLUTION INTRAVENOUS; SUBCUTANEOUS at 15:40

## 2023-08-07 RX ADMIN — SODIUM CHLORIDE, POTASSIUM CHLORIDE, SODIUM LACTATE AND CALCIUM CHLORIDE: 600; 310; 30; 20 INJECTION, SOLUTION INTRAVENOUS at 15:21

## 2023-08-07 RX ADMIN — MIDAZOLAM 2 MG: 1 INJECTION INTRAMUSCULAR; INTRAVENOUS at 15:21

## 2023-08-07 RX ADMIN — GLYCOPYRROLATE 0.2 MG: 0.2 INJECTION, SOLUTION INTRAMUSCULAR; INTRAVENOUS at 15:46

## 2023-08-07 RX ADMIN — FENTANYL CITRATE 50 MCG: 50 INJECTION, SOLUTION INTRAMUSCULAR; INTRAVENOUS at 15:59

## 2023-08-07 RX ADMIN — Medication 20 MG: at 15:51

## 2023-08-07 RX ADMIN — LIDOCAINE HYDROCHLORIDE 100 MG: 20 INJECTION, SOLUTION INFILTRATION; PERINEURAL at 15:28

## 2023-08-07 RX ADMIN — Medication 50 MG: at 15:28

## 2023-08-07 RX ADMIN — PROPOFOL 150 MG: 10 INJECTION, EMULSION INTRAVENOUS at 15:28

## 2023-08-07 RX ADMIN — SUGAMMADEX 200 MG: 100 INJECTION, SOLUTION INTRAVENOUS at 17:24

## 2023-08-07 RX ADMIN — DEXAMETHASONE SODIUM PHOSPHATE 6 MG: 4 INJECTION, SOLUTION INTRA-ARTICULAR; INTRALESIONAL; INTRAMUSCULAR; INTRAVENOUS; SOFT TISSUE at 15:48

## 2023-08-07 RX ADMIN — FENTANYL CITRATE 50 MCG: 50 INJECTION, SOLUTION INTRAMUSCULAR; INTRAVENOUS at 16:47

## 2023-08-07 RX ADMIN — ONDANSETRON 4 MG: 2 INJECTION INTRAMUSCULAR; INTRAVENOUS at 03:16

## 2023-08-07 RX ADMIN — ACETAMINOPHEN 650 MG: 325 TABLET, FILM COATED ORAL at 18:51

## 2023-08-07 RX ADMIN — Medication 20 MG: at 16:33

## 2023-08-07 RX ADMIN — SODIUM CHLORIDE 1000 ML: 9 INJECTION, SOLUTION INTRAVENOUS at 03:13

## 2023-08-07 RX ADMIN — MORPHINE SULFATE 4 MG: 4 INJECTION INTRAVENOUS at 03:13

## 2023-08-07 RX ADMIN — FENTANYL CITRATE 100 MCG: 50 INJECTION, SOLUTION INTRAMUSCULAR; INTRAVENOUS at 15:28

## 2023-08-07 RX ADMIN — OXYCODONE HYDROCHLORIDE 5 MG: 5 TABLET ORAL at 18:51

## 2023-08-07 ASSESSMENT — ACTIVITIES OF DAILY LIVING (ADL)
ADLS_ACUITY_SCORE: 35

## 2023-08-07 NOTE — ED TRIAGE NOTES
Patient was here in June and was dx gallstones, and patient reports feeling like he passed them, however around 0130 pt was woken up with severe abdominal pain in his right upper quadrant and took a Vicodin about an hour ago, but it has not worked.

## 2023-08-07 NOTE — TELEPHONE ENCOUNTER
"Routing refill request to provider for review/approval because:  --Last visit:  12/20/2022 Gladys- \"Gastroesophageal reflux disease without esophagitis - recommend PPI treatment for 30-60 days. Given not triggered by food, recommend h pylori testing. Follow up if symptoms not resolved after 60 days of PPI.\"     --Patient Comment: \"This is working quite well controlling my reflux or heartburn, whatever it is. I run out, it comes back. Doesn't seem food related. Oddly i belch almost anytime I stand from a horizontal position. I'm not thrilled with taking it daily if you want to do some further diagnostics to figure out what's causing it.\"    --I sent MyChart to patient asking him to schedule a follow up appointment with .    --Future Visit: none with family practice.        --Last Written Prescription:     Disp Refills Start End [facility]   omeprazole (PRILOSEC) 20 MG DR capsule 60 capsule 0 5/25/2023  No   Sig - Route: Take 1 capsule (20 mg) by mouth daily - Oral         "

## 2023-08-07 NOTE — ED PROVIDER NOTES
ED Provider Note  [facility]      History     Chief Complaint   Patient presents with    Abdominal Pain     Patient was here in June and was dx gallstones, and patient reports feeling like he passed them, however around 0130 pt was woken up with severe abdominal pain in his right upper quadrant and took a Vicodin about an hour ago, but it has not worked.      HPI  Manfred Young is a 52 year old male who has a PMHx of cholelithiasis, nephrolithiasis presenting with abd pain. Patient states he had some pain 2 nights ago after dinner.  Patient woke up at 130 today with abdominal pain in the right upper quadrant that is similar to prior episode of cholelithiasis for which she was seen here in our emergency department.  He did see surgery at that point in April of this year, he apparently elected to not have his gallbladder removed according to the notes.  He is on multiple family members who have had issues after getting the gallbladder removed.  Patient otherwise denies fevers.  He has had nonbloody nonbilious emesis tonight.  Denies chills, body aches.  He is having normal bowel movements.  No urinary changes or hematuria.  Denies radiation of the pain.  No chest pain or shortness of breath.  No exertional chest pain or shortness of breath.  No flank pain.    Past Medical History  Past Medical History:   Diagnosis Date    NO ACTIVE PROBLEMS      Past Surgical History:   Procedure Laterality Date    SURGICAL HISTORY OF -       detached retina    SURGICAL HISTORY OF -       arthroscopic knee surgery-Right x1, left x1, plica repair     ibuprofen (ADVIL/MOTRIN) 200 MG tablet  omeprazole (PRILOSEC) 20 MG DR capsule  oxyCODONE-acetaminophen (PERCOCET) 5-325 MG tablet  tadalafil (CIALIS) 5 MG tablet      Allergies   Allergen Reactions    Bee Venom Swelling     Gets hot    Chlorhexidine Gluconate Itching     Has a sensitivity    Penicillins Diarrhea    Wasp Venom Protein Other (See Comments)      High fever after being stung by bee.     Family History  Family History   Problem Relation Age of Onset    Cerebrovascular Disease Maternal Grandmother     Osteoporosis Mother     Lipids Father      Social History   Social History     Tobacco Use    Smoking status: Never    Smokeless tobacco: Never   Vaping Use    Vaping Use: Never used   Substance Use Topics    Alcohol use: Yes    Drug use: No         A medically appropriate review of systems was performed with pertinent positives and negatives noted in the HPI, and all other systems negative.    Physical Exam   BP: 124/84  Pulse: 65  Temp: 98.6  F (37  C)  Resp: 16  Weight: 93 kg (205 lb)  SpO2: 97 %  Physical Exam  Physical Exam   Constitutional: oriented to person, place, and time. appears well-developed and well-nourished.   HENT:   Head: Normocephalic and atraumatic.   Neck: Normal range of motion.   Pulmonary/Chest: Effort normal. No respiratory distress.   Cardiac: No murmurs, rubs, gallops. RRR.  Abdominal: Abdomen soft, tender palpation the right upper quadrant, nondistended. No rebound tenderness.  No CVA tenderness bilaterally  MSK: Long bones without deformity or evidence of trauma  Neurological: alert and oriented to person, place, and time.   Skin: Skin is warm and dry.   Psychiatric:  normal mood and affect.  behavior is normal. Thought content normal.       ED Course, Procedures, & Data      Procedures            Results for orders placed or performed during the hospital encounter of 08/07/23   US Abdomen Limited (RUQ)     Status: None    Narrative    EXAM: US ABDOMEN LIMITED  LOCATION: [facility]  DATE: 8/7/2023    INDICATION: ruq pain, hx gallstones, worsening pain  COMPARISON: None.  TECHNIQUE: Limited abdominal ultrasound.    FINDINGS:    GALLBLADDER: Small stone in the gallbladder. The gallbladder otherwise appears normal. No wall thickening or pericholecystic fluid. There is a positive sonographic  Garcia sign.    BILE DUCTS: Common bile duct is borderline dilated. No cause of obstruction is evident. The common duct measures 7 mm.    LIVER: Normal parenchyma with smooth contour. No focal mass.    RIGHT KIDNEY: No hydronephrosis.    PANCREAS: Obscured by bowel gas.    No ascites.      Impression    IMPRESSION:  1.  Cholelithiasis. There is a positive sonographic Garcia sign but no other evidence of acute cholecystitis.       Comprehensive metabolic panel     Status: Abnormal   Result Value Ref Range    Sodium 141 136 - 145 mmol/L    Potassium 3.9 3.4 - 5.3 mmol/L    Chloride 106 98 - 107 mmol/L    Carbon Dioxide (CO2) 24 22 - 29 mmol/L    Anion Gap 11 7 - 15 mmol/L    Urea Nitrogen 11.0 6.0 - 20.0 mg/dL    Creatinine 0.89 0.67 - 1.17 mg/dL    Calcium 9.1 8.6 - 10.0 mg/dL    Glucose 113 (H) 70 - 99 mg/dL    Alkaline Phosphatase 56 40 - 129 U/L    AST 17 0 - 45 U/L    ALT 19 0 - 70 U/L    Protein Total 6.4 6.4 - 8.3 g/dL    Albumin 4.3 3.5 - 5.2 g/dL    Bilirubin Total 0.3 <=1.2 mg/dL    GFR Estimate >90 >60 mL/min/1.73m2   Lipase     Status: Normal   Result Value Ref Range    Lipase 48 13 - 60 U/L   CBC with platelets and differential     Status: Abnormal   Result Value Ref Range    WBC Count 4.8 4.0 - 11.0 10e3/uL    RBC Count 5.03 4.40 - 5.90 10e6/uL    Hemoglobin 12.3 (L) 13.3 - 17.7 g/dL    Hematocrit 39.5 (L) 40.0 - 53.0 %    MCV 79 78 - 100 fL    MCH 24.5 (L) 26.5 - 33.0 pg    MCHC 31.1 (L) 31.5 - 36.5 g/dL    RDW 15.4 (H) 10.0 - 15.0 %    Platelet Count 230 150 - 450 10e3/uL    % Neutrophils 50 %    % Lymphocytes 32 %    % Monocytes 13 %    % Eosinophils 4 %    % Basophils 1 %    % Immature Granulocytes 0 %    NRBCs per 100 WBC 0 <1 /100    Absolute Neutrophils 2.4 1.6 - 8.3 10e3/uL    Absolute Lymphocytes 1.6 0.8 - 5.3 10e3/uL    Absolute Monocytes 0.6 0.0 - 1.3 10e3/uL    Absolute Eosinophils 0.2 0.0 - 0.7 10e3/uL    Absolute Basophils 0.1 0.0 - 0.2 10e3/uL    Absolute Immature Granulocytes 0.0 <=0.4  10e3/uL    Absolute NRBCs 0.0 10e3/uL   CBC with platelets differential     Status: Abnormal    Narrative    The following orders were created for panel order CBC with platelets differential.  Procedure                               Abnormality         Status                     ---------                               -----------         ------                     CBC with platelets and d...[[id number]]  Abnormal            Final result                 Please view results for these tests on the individual orders.     Medications   0.9% sodium chloride BOLUS (has no administration in time range)   ondansetron (ZOFRAN) injection 4 mg (has no administration in time range)   morphine (PF) injection 4 mg (has no administration in time range)     Labs Ordered and Resulted from Time of ED Arrival to Time of ED Departure - No data to display  No orders to display          Critical care was not performed.     Medical Decision Making  The patient's presentation was of moderate complexity (an acute illness with systemic symptoms).    The patient's evaluation involved:  review of external note(s) from 1 sources (surgical visit in April)  ordering and/or review of 3+ test(s) in this encounter (see separate area of note for details)  discussion of management or test interpretation with another health professional (gastroenterology and hospitalist)    The patient's management necessitated high risk (a decision regarding hospitalization).    Assessment & Plan    MDM  Patient presenting with ongoing right upper quadrant pain.  This is a second night in a row that he has had symptomatic right upper quadrant pain with a new dilation of his common bile duct.  He is having ongoing pain despite a few rounds of IV opioids.  Lower suspicion for cholangitis or cholecystitis.  Discussed with gastroenterology who recommended MRCP.  Plan for him to stay in the [facility] for the MRCP plus or minus a surgical consult if having ongoing pain.   Patient agrees with plan.    Addendum: After discussion with gastroenterology, MRI planned.  The patient can go down at this point to MRI.  Due to improving symptoms will defer observation stay.  If MRCP is unremarkable likely can be discharged with outpatient follow-up.  If continued to have pain then likely can go to observation or medicine for pain control and possible surgical consult for symptomatic cholelithiasis.    I have reviewed the nursing notes. I have reviewed the findings, diagnosis, plan and need for follow up with the patient.    New Prescriptions    No medications on file       Final diagnoses:   Symptomatic cholelithiasis       Jose Falk  [facility] EMERGENCY DEPARTMENT  8/7/2023     Jose Falk MD  08/07/23 0557       Jose Falk MD  08/07/23 0604

## 2023-08-07 NOTE — ED NOTES
9:47 AM this was signed out to me pending the results of an MRCP he had classic symptoms of biliary colic.  MRCP shows impacted 6 mm stone in the gallbladder neck.  Also some pericholecystic fluid and a positive Garcia sign.  Case discussed with Dr. Whitmore from general surgery we will give him ertapenem 1 g keep him n.p.o. and transfer him to the [facility] area at noon today.  Patient is aware of the plan.     Nick Johnson MD  08/07/23 0952

## 2023-08-07 NOTE — ANESTHESIA PREPROCEDURE EVALUATION
Anesthesia Pre-Procedure Evaluation    Patient: Manfred Young   MRN: [id number] : 1970        Procedure : Procedure(s):  Laparoscopic cholecystectomy          Past Medical History:   Diagnosis Date    NO ACTIVE PROBLEMS       Past Surgical History:   Procedure Laterality Date    SURGICAL HISTORY OF -       detached retina    SURGICAL HISTORY OF -       arthroscopic knee surgery-Right x1, left x1, plica repair      Allergies   Allergen Reactions    Bee Venom Swelling     Gets hot    Chlorhexidine Gluconate Itching     Has a sensitivity    Penicillins Diarrhea    Wasp Venom Protein Other (See Comments)     High fever after being stung by bee.      Social History     Tobacco Use    Smoking status: Never    Smokeless tobacco: Never   Substance Use Topics    Alcohol use: Yes      Wt Readings from Last 1 Encounters:   23 93 kg (205 lb)        Anesthesia Evaluation   Pt has had prior anesthetic. Type: General.        ROS/MED HX  ENT/Pulmonary:       Neurologic:       Cardiovascular:       METS/Exercise Tolerance:     Hematologic:       Musculoskeletal:       GI/Hepatic:     (+) GERD, Asymptomatic on medication,                  Renal/Genitourinary:     (+) renal disease,             Endo:       Psychiatric/Substance Use:       Infectious Disease:       Malignancy:       Other:            Physical Exam    Airway        Mallampati: I    Neck ROM: full     Respiratory Devices and Support         Dental       (+) Minor Abnormalities - some fillings, tiny chips      Cardiovascular   cardiovascular exam normal          Pulmonary   pulmonary exam normal                OUTSIDE LABS:  CBC:   Lab Results   Component Value Date    WBC 4.8 2023    WBC 6.1 2023    HGB 12.3 (L) 2023    HGB 10.0 (L) 2023    HCT 39.5 (L) 2023    HCT 32.9 (L) 2023     2023     2023     BMP:   Lab Results   Component Value Date     2023     2023     POTASSIUM 3.9 08/07/2023    POTASSIUM 4.6 04/19/2023    CHLORIDE 106 08/07/2023    CHLORIDE 102 04/19/2023    CO2 24 08/07/2023    CO2 23 04/19/2023    BUN 11.0 08/07/2023    BUN 13.0 04/19/2023    CR 0.89 08/07/2023    CR 0.77 04/19/2023     (H) 08/07/2023    GLC 97 04/19/2023     COAGS:   Lab Results   Component Value Date    PTT 27 04/12/2021    INR 1.04 04/12/2021     POC: No results found for: BGM, HCG, HCGS  HEPATIC:   Lab Results   Component Value Date    ALBUMIN 4.3 08/07/2023    PROTTOTAL 6.4 08/07/2023    ALT 19 08/07/2023    AST 17 08/07/2023    ALKPHOS 56 08/07/2023    BILITOTAL 0.3 08/07/2023     OTHER:   Lab Results   Component Value Date    A1C 5.2 04/12/2021    ROSENDO 9.1 08/07/2023    PHOS 3.0 04/12/2021    LIPASE 48 08/07/2023       Anesthesia Plan    ASA Status:  2    NPO Status:  NPO Appropriate    Anesthesia Type: General.     - Airway: ETT   Induction: Intravenous.   Maintenance: Balanced.   Techniques and Equipment:     - Lines/Monitors: 2nd IV     Consents    Anesthesia Plan(s) and associated risks, benefits, and realistic alternatives discussed. Questions answered and patient/representative(s) expressed understanding.     - Discussed: Risks, Benefits and Alternatives for BOTH SEDATION and the PROCEDURE were discussed     - Discussed with:  Patient       Use of blood products discussed: Yes.     - Discussed with: Patient.     Postoperative Care    Pain management: IV analgesics.   PONV prophylaxis: Ondansetron (or other 5HT-3)     Comments:                Tamir Martinez MD

## 2023-08-07 NOTE — ANESTHESIA PREPROCEDURE EVALUATION
Anesthesia Pre-Procedure Evaluation    Patient: Manfred Young   MRN: [id number] : 1970        Procedure : Procedure(s):  Laparoscopic cholecystectomy          Past Medical History:   Diagnosis Date    NO ACTIVE PROBLEMS       Past Surgical History:   Procedure Laterality Date    SURGICAL HISTORY OF -       detached retina    SURGICAL HISTORY OF -       arthroscopic knee surgery-Right x1, left x1, plica repair      Allergies   Allergen Reactions    Bee Venom Swelling     Gets hot    Chlorhexidine Gluconate Itching     Has a sensitivity    Penicillins Diarrhea    Wasp Venom Protein Other (See Comments)     High fever after being stung by bee.      Social History     Tobacco Use    Smoking status: Never    Smokeless tobacco: Never   Substance Use Topics    Alcohol use: Yes      Wt Readings from Last 1 Encounters:   23 93 kg (205 lb)        Anesthesia Evaluation   Pt has had prior anesthetic. Type: General.        ROS/MED HX  ENT/Pulmonary:       Neurologic:       Cardiovascular:       METS/Exercise Tolerance:     Hematologic:       Musculoskeletal:       GI/Hepatic:     (+) GERD, Asymptomatic on medication,                  Renal/Genitourinary:     (+) renal disease,             Endo:       Psychiatric/Substance Use:       Infectious Disease:       Malignancy:       Other:            Physical Exam    Airway        Mallampati: I    Neck ROM: full     Respiratory Devices and Support         Dental       (+) Minor Abnormalities - some fillings, tiny chips      Cardiovascular   cardiovascular exam normal          Pulmonary   pulmonary exam normal                OUTSIDE LABS:  CBC:   Lab Results   Component Value Date    WBC 4.8 2023    WBC 6.1 2023    HGB 12.3 (L) 2023    HGB 10.0 (L) 2023    HCT 39.5 (L) 2023    HCT 32.9 (L) 2023     2023     2023     BMP:   Lab Results   Component Value Date     2023     2023     POTASSIUM 3.9 08/07/2023    POTASSIUM 4.6 04/19/2023    CHLORIDE 106 08/07/2023    CHLORIDE 102 04/19/2023    CO2 24 08/07/2023    CO2 23 04/19/2023    BUN 11.0 08/07/2023    BUN 13.0 04/19/2023    CR 0.89 08/07/2023    CR 0.77 04/19/2023     (H) 08/07/2023    GLC 97 04/19/2023     COAGS:   Lab Results   Component Value Date    PTT 27 04/12/2021    INR 1.04 04/12/2021     POC: No results found for: BGM, HCG, HCGS  HEPATIC:   Lab Results   Component Value Date    ALBUMIN 4.3 08/07/2023    PROTTOTAL 6.4 08/07/2023    ALT 19 08/07/2023    AST 17 08/07/2023    ALKPHOS 56 08/07/2023    BILITOTAL 0.3 08/07/2023     OTHER:   Lab Results   Component Value Date    A1C 5.2 04/12/2021    ROSENDO 9.1 08/07/2023    PHOS 3.0 04/12/2021    LIPASE 48 08/07/2023       Anesthesia Plan    ASA Status:  2    NPO Status:  NPO Appropriate    Anesthesia Type: General.     - Airway: ETT   Induction: Intravenous.   Maintenance: Balanced.   Techniques and Equipment:     - Lines/Monitors: 2nd IV     Consents    Anesthesia Plan(s) and associated risks, benefits, and realistic alternatives discussed. Questions answered and patient/representative(s) expressed understanding.     - Discussed: Risks, Benefits and Alternatives for BOTH SEDATION and the PROCEDURE were discussed     - Discussed with:  Patient       Use of blood products discussed: Yes.     - Discussed with: Patient.     Postoperative Care    Pain management: IV analgesics.   PONV prophylaxis: Ondansetron (or other 5HT-3)     Comments:                Ze Avendano MD

## 2023-08-07 NOTE — ED NOTES
Patient has an allergy to chlorhexidine, declines preoperative bath with wipes. Will notify MD to identify alternatives.

## 2023-08-07 NOTE — PROGRESS NOTES
GENERAL SURGERY    The patient is a 52 year old man with early acute cholecystectomy.  The patient was evaluated earlier for possible symptomatic cholelithiasis but did not undergo cholecystectomy at that time.  The patient now presents with continuous RUQ pain and a MR cholangiogram demonstrate a stone in the neck of the gallbladder and marge cholecystitic fluid c/w early acute cholecystitis.    PAST MEDICAL HISTORY:  Past Medical History:   Diagnosis Date    NO ACTIVE PROBLEMS         PAST SURGICAL HISTORY:  Past Surgical History:   Procedure Laterality Date    SURGICAL HISTORY OF -       detached retina    SURGICAL HISTORY OF -       arthroscopic knee surgery-Right x1, left x1, plica repair        MEDICATIONS:  No current facility-administered medications for this encounter.     No current outpatient medications on file.     Facility-Administered Medications Ordered in Other Encounters   Medication    acetaminophen (TYLENOL) tablet 975 mg    clindamycin (CLEOCIN) 900 mg in 50 mL D5W intermittent infusion    clindamycin (CLEOCIN) 900 mg in 50 mL D5W intermittent infusion    heparin ANTICOAGULANT injection 5,000 Units        ALLERGIES:  Allergies   Allergen Reactions    Bee Venom Swelling     Gets hot    Chlorhexidine Gluconate Itching     Has a sensitivity    Penicillins Diarrhea    Wasp Venom Protein Other (See Comments)     High fever after being stung by bee.        SOCIAL HISTORY:  Social History     Socioeconomic History    Marital status:      Spouse name: None    Number of children: None    Years of education: None    Highest education level: None   Occupational History    Occupation: Build furniture     Employer: SELF   Tobacco Use    Smoking status: Never    Smokeless tobacco: Never   Vaping Use    Vaping Use: Never used   Substance and Sexual Activity    Alcohol use: Yes    Drug use: No    Sexual activity: Yes     Partners: Female       FAMILY HISTORY:  Family History   Problem Relation Age of  "Onset    Cerebrovascular Disease Maternal Grandmother     Osteoporosis Mother     Lipids Father         PHYSICAL EXAM:  Vital Signs: /87 (BP Location: Left arm)   Pulse 52   Temp 98.7  F (37.1  C) (Oral)   Resp 16   Ht 1.854 m (6' 1\")   Wt 93 kg (205 lb)   SpO2 100%   BMI 27.05 kg/m    ABD: soft and no masses; tender in the RUQ.        Plan: lap gabriel today.  Full informed consent obtained in the pre-op area.  Patient understanding the risk and wants to proceed to the OR.    Cody Whitmore MD, PhD  "

## 2023-08-07 NOTE — BRIEF OP NOTE
[facility]    Brief Operative Note    Pre-operative diagnosis: * No pre-op diagnosis entered *  Post-operative diagnosis Acute cholecystitis    Procedure: Procedure(s):  Laparoscopic cholecystectomy  Surgeon: Surgeon(s) and Role:     * Cody Whitmore MD - Primary     * Bhavana Lopez MD - Resident - Assisting     * Missy Maloney MD - Resident - Assisting  Anesthesia: General   Estimated Blood Loss: 4 ml    Drains: None  Specimens: * No specimens in log *  Findings:   Edematous gallbladder, left hepatic artery seen approaching gallbladder - preserved.  Complications: None.  Implants: * No implants in log *    Bhavana Lopez MD  PGY-7, General Surgery

## 2023-08-07 NOTE — ANESTHESIA POSTPROCEDURE EVALUATION
Patient: Manfred Young    Procedure: Procedure(s):  Laparoscopic cholecystectomy       Anesthesia Type:  No value filed.    Note:  Disposition: Outpatient   Postop Pain Control: Uneventful            Sign Out: Well controlled pain   PONV: No   Neuro/Psych: Uneventful            Sign Out: Acceptable/Baseline neuro status   Airway/Respiratory: Uneventful            Sign Out: Acceptable/Baseline resp. status   CV/Hemodynamics: Uneventful            Sign Out: Acceptable CV status; No obvious hypovolemia; No obvious fluid overload   Other NRE: NONE   DID A NON-ROUTINE EVENT OCCUR? No           Last vitals:  Vitals Value Taken Time   /89 08/07/23 1845   Temp 36.9  C (98.4  F) 08/07/23 1739   Pulse 66 08/07/23 1850   Resp 7 08/07/23 1850   SpO2 97 % 08/07/23 1850   Vitals shown include unvalidated device data.    Electronically Signed By: Tamir Martinez MD  August 7, 2023  6:50 PM

## 2023-08-07 NOTE — ED TRIAGE NOTES
Patient was here in April and was dx w/gallstones, and patient reports feeling like he passed them, however around 0130 pt was woken up with severe abdominal pain in his right upper quadrant and took a Vicodin about an hour ago, but it has not worked.      Triage Assessment       Row Name 08/07/23 5654       Triage Assessment (Adult)    Airway WDL WDL       Respiratory WDL    Respiratory WDL WDL       Skin Circulation/Temperature WDL    Skin Circulation/Temperature WDL WDL       Cardiac WDL    Cardiac WDL WDL       Peripheral/Neurovascular WDL    Peripheral Neurovascular WDL WDL       Cognitive/Neuro/Behavioral WDL    Cognitive/Neuro/Behavioral WDL WDL

## 2023-08-07 NOTE — ANESTHESIA CARE TRANSFER NOTE
Patient: Manfred Young    Procedure: Procedure(s):  Laparoscopic cholecystectomy       Diagnosis: * No pre-op diagnosis entered *  Diagnosis Additional Information: No value filed.    Anesthesia Type:   No value filed.     Note:    Oropharynx: oropharynx clear of all foreign objects and spontaneously breathing  Level of Consciousness: awake  Oxygen Supplementation: face mask  Level of Supplemental Oxygen (L/min / FiO2): 6  Independent Airway: airway patency satisfactory and stable  Dentition: dentition unchanged  Vital Signs Stable: post-procedure vital signs reviewed and stable  Report to RN Given: handoff report given  Patient transferred to: PACU    Handoff Report: Identifed the Patient, Identified the Reponsible Provider, Reviewed the pertinent medical history, Discussed the surgical course, Reviewed Intra-OP anesthesia mangement and issues during anesthesia, Set expectations for post-procedure period and Allowed opportunity for questions and acknowledgement of understanding      Vitals:  Vitals Value Taken Time   /75 08/07/23 1739   Temp     Pulse 72 08/07/23 1748   Resp 8 08/07/23 1748   SpO2 100 % 08/07/23 1748   Vitals shown include unvalidated device data.    Electronically Signed By: GILDARDO Enamorado CRNA  August 7, 2023  5:49 PM   No

## 2023-08-07 NOTE — ANESTHESIA PROCEDURE NOTES
Airway       Patient location during procedure: OR       Procedure Start/Stop Times: 8/7/2023 3:31 PM  Staff -        CRNA: Patricia Duran APRN CRNA       Performed By: CRNAIndications and Patient Condition       Indications for airway management: marge-procedural       Induction type:intravenous       Mask difficulty assessment: 1 - vent by mask    Final Airway Details       Final airway type: endotracheal airway       Successful airway: ETT - single  Endotracheal Airway Details        ETT size (mm): 7.5       Cuffed: yes       Successful intubation technique: direct laryngoscopy       DL Blade Type: MAC 3       Grade View of Cords: 1 (G1 with slight cricoid pressure)       Adjucts: stylet       Position: Right       Measured from: lips       Secured at (cm): 24    Post intubation assessment        Placement verified by: capnometry, equal breath sounds and chest rise        Number of attempts at approach: 1       Secured with: silk tape       Ease of procedure: easy       Dentition: Intact and Unchanged    Medication(s) Administered   Medication Administration Time: 8/7/2023 3:31 PM

## 2023-08-07 NOTE — OR NURSING
Warm handoff completed with Megan Galan RN and Patricia Duran CRNA, with myself Vania Alcantara OR RN.    At time of warm handoff, Completed consent by patient was verified amongst all three parties, as well as the physician and family.    Epic issues resulted in new admission occurrence to be created, which deleted original consent.  At this time patient was already intubated and in the operating room.   Prior to TIMEOUT, deleted consent was discovered, and a new consent was created with patient's mother and verified by Dr. Whitmore.

## 2023-08-08 ENCOUNTER — MYC MEDICAL ADVICE (OUTPATIENT)
Dept: FAMILY MEDICINE | Facility: CLINIC | Age: 53
End: 2023-08-08
Payer: COMMERCIAL

## 2023-08-08 DIAGNOSIS — K21.9 GASTROESOPHAGEAL REFLUX DISEASE WITHOUT ESOPHAGITIS: ICD-10-CM

## 2023-08-08 RX ORDER — FAMOTIDINE 20 MG/1
20 TABLET, FILM COATED ORAL 2 TIMES DAILY
Qty: 120 TABLET | Refills: 0 | Status: SHIPPED | OUTPATIENT
Start: 2023-08-08 | End: 2024-08-21

## 2023-08-08 NOTE — DISCHARGE INSTRUCTIONS
"[facility]  Same-Day Surgery   Adult Discharge Orders & Instructions     For 24 hours after surgery    Get plenty of rest.  A responsible adult must stay with you for at least 24 hours after you leave the hospital.   Do not drive or use heavy equipment.  If you have weakness or tingling, don't drive or use heavy equipment until this feeling goes away.  Do not drink alcohol.  Avoid strenuous or risky activities.  Ask for help when climbing stairs.   You may feel lightheaded.  IF so, sit for a few minutes before standing.  Have someone help you get up.   If you have nausea (feel sick to your stomach): Drink only clear liquids such as apple juice, ginger ale, broth or 7-Up.  Rest may also help.  Be sure to drink enough fluids.  Move to a regular diet as you feel able.  You may have a slight fever. Call the doctor if your fever is over 100 F (37.7 C) (taken under the tongue) or lasts longer than 24 hours.  You may have a dry mouth, a sore throat, muscle aches or trouble sleeping.  These should go away after 24 hours.  Do not make important or legal decisions.   Call your doctor for any of the followin.  Signs of infection (fever, growing tenderness at the surgery site, a large amount of drainage or bleeding, severe pain, foul-smelling drainage, redness, swelling).    2. It has been over 8 to 10 hours since surgery and you are still not able to urinate (pass water).    3.  Headache for over 24 hours.  To contact a doctor, call Dr. Whitmore at General surgery clinic at [phone] or:    '   [phone] and ask for the \"resident on call for General Surgery\" (answered 24 hours a day)  '   Emergency Department:    [facility]: [phone]       (TTY for hearing impaired: [phone])  "

## 2023-08-09 ENCOUNTER — PATIENT OUTREACH (OUTPATIENT)
Dept: SURGERY | Facility: CLINIC | Age: 53
End: 2023-08-09
Payer: COMMERCIAL

## 2023-08-09 NOTE — PROGRESS NOTES
RN Post-Op/Post-Discharge Care Coordination Note    Mr. Manfred Young is a 52 year old male who underwent laparoscopic cholecystectomy on 8/7 with  Dr. Cody Whitmore.  Spoke with Patient.    Support  Patient able to care for self independently     Health Status  Nausea/Vomiting: Patient denies nausea/vomiting.  Eating/drinking: Patient is able to eat and drink without any complaints.  Bowel habits: Patient reports having a normal bowel movement.  Drains (KRISTA): N/A  Fevers/chills: Patient denies any fever or chills.  Incisions: Patient denies any signs and symptoms of infection..  Wound closure:  Skin Sealant  Pain: Improved and he has transitioned to Tylenol PRN.  New Medications:  Tylenol, Oxycodone, Senna    Activity/Restrictions  No lifting in excess of 15-20 pounds for 3-4 weeks    Equipment  None    Pathology reviewed with patient:  No, not yet available    Forms/Letters  No    All of his questions were answered including reviewing restrictions and wound care.  He will call this office if he has any further questions and/or concerns.      Telephone visit arranged with Dr. Whitmore on 9/5 at 1340- pt request.    Whom and When to Call  Patient acknowledges understanding of how to manage any medication changes and   when to seek medical care.     Patient advised that if after hour medical concerns arise to please call [phone] and choose option 4 to speak to the physician on call.

## 2023-08-10 LAB
PATH REPORT.COMMENTS IMP SPEC: NORMAL
PATH REPORT.COMMENTS IMP SPEC: NORMAL
PATH REPORT.FINAL DX SPEC: NORMAL
PATH REPORT.GROSS SPEC: NORMAL
PATH REPORT.MICROSCOPIC SPEC OTHER STN: NORMAL
PATH REPORT.RELEVANT HX SPEC: NORMAL
PHOTO IMAGE: NORMAL

## 2023-08-10 NOTE — TELEPHONE ENCOUNTER
Patient sent the following update on his reflux and gall bladder:  Gerardo Sims,  Thanks for the reply. The plot thickens. I started having gallbladder attacks in April. I did a flush and thought I passed that stone. No symptoms for a couple months. Then a couple random smaller attacks. Well I ended up in the ER Sunday night with a total blockage, and has removal surgery yesterday. Wondering if the two things were related? I might try to go without for a bit to see what happens.

## 2023-08-14 NOTE — OP NOTE
[facility]     Brief Operative Note     Pre-operative diagnosis:         * No pre-op diagnosis entered *  Postoperative diagnosis        Acute cholecystitis     Procedure:      Procedure(s):  Laparoscopic cholecystectomy  Surgeon:         Surgeon(s) and Role:     * Cody Whitmore MD - Primary     * Bhavana Lopez MD - Resident - Assisting     * Missy Maloney MD - Resident - Assisting  Anesthesia:     General             Estimated Blood Loss: 4 ml     Drains: None  Specimens:    Gallbladder  Findings: Edematous gallbladder, left hepatic artery seen approaching gallbladder - preserved.  Complications:  None.  Implants:    No implants    Clinical History:  The patient presented with RUQ pain and imaging findings that were consistent with acute cholecystitis. I reviewed the risks of gallbladder removal with this patient. The risk discussion included, but was not limited to, death, myocardial infarction, pneumonia, urinary tract infection, deep venous thrombosis with or without pulmonary embolus, abdominal infection from bowel injury or abscess, bowel obstruction, wound infection, and bleeding. Specific risks related to cholecystectomy include bile duct injury (3-4/1000), bile leak (10/1000), retained common bile duct stone (10/1000), postcholecystectomy diarrhea (1-2%), and these complications may require additional treatment. The potential that gallbladder removal may NOT be of benefit was also reviewed. Furthermore, alternative therapies and the option for no therapy were also reviewed. After understanding the risks and benefits of proceeding with surgery, the patient consented to laparoscopic cholecystectomy. Postoperative treatment and expected follow-up were also reviewed.    Procedure:       The patient was brought to the main operating room.  The patient was prepped and draped in a sterile fashion.  A timeout to confirm the patient's identity and the  procedure was performed prior to surgery and documented by the nursing team. Under general anesthesia, a left upper quadrant Veress needle was inserted, and pneumoperitoneum was established using carbon dioxide gas to a maximum pressure of 15 mmHg. A total of 4 ports were placed, and the laparoscope was utilized from the umbilical port. The patient was placed in a steep reverse Trendelenberg position. The gallbladder was grasped at its fundus and retracted cephalad. It was also grasped at its infundibulum and retracted laterally. A complete dissection starting on the gallbladder, identifying the cystic artery on the surface of the gallbladder, was performed. The left hepatic artery approaching the gallbladder and was preserved.The cystic artery, in this manner, was  away from the gallbladder. The infundibulum of the gallbladder and the junction of the gallbladder and cystic duct were clearly and completely identified with blunt and electrocautery dissection. All of this dissection was performed on the gallbladder wall and clearly above the triangle of Calot. The peritoneum on each side of the gallbladder was divided at least one-half of the way up toward the top of the gallbladder. Next, a complete dissection of the upper aspect of the triangle of Calot was performed, and the critical view of safety was achieved. The cystic artery and cystic duct were then the only two structures traversing from the gallbladder toward the saman hepatis.  The cystic artery and cystic duct were clipped securely and divided between clips. The gallbladder was removed from its fossa using electrocautery. It was placed into an Endocatch bag and, removed from the abdomen, and passed off to pathology. Complete hemostasis was achieved. The umbilical incision was closed using 0 Vicryl suture in figure of eight fashion.  The skin was re-approximated using a 4-0 Monocryl suture, and skin glue was applied.  I was present for all  critical components of the operation.  The needle and sponge counts were correct x2 at the end of the procedure.    Cody Whitmore MD, PhD

## 2023-08-31 ENCOUNTER — OFFICE VISIT (OUTPATIENT)
Dept: SURGERY | Facility: CLINIC | Age: 53
End: 2023-08-31
Payer: COMMERCIAL

## 2023-08-31 ENCOUNTER — PATIENT OUTREACH (OUTPATIENT)
Dept: SURGERY | Facility: CLINIC | Age: 53
End: 2023-08-31

## 2023-08-31 VITALS
HEART RATE: 70 BPM | DIASTOLIC BLOOD PRESSURE: 74 MMHG | BODY MASS INDEX: 27.26 KG/M2 | OXYGEN SATURATION: 98 % | WEIGHT: 205.7 LBS | SYSTOLIC BLOOD PRESSURE: 111 MMHG | HEIGHT: 73 IN

## 2023-08-31 DIAGNOSIS — R19.7 DIARRHEA: Primary | ICD-10-CM

## 2023-08-31 DIAGNOSIS — Z98.890 POST-OPERATIVE STATE: Primary | ICD-10-CM

## 2023-08-31 PROCEDURE — 99024 POSTOP FOLLOW-UP VISIT: CPT | Performed by: SURGERY

## 2023-08-31 ASSESSMENT — PAIN SCALES - GENERAL: PAINLEVEL: MILD PAIN (2)

## 2023-08-31 NOTE — LETTER
"8/31/2023       RE: Manfred Young  [address]  [place] [address]     Dear Colleague,    Thank you for referring your patient, Manfred Young, to the [facility] GENERAL SURGERY [facility] [place] at [facility]. Please see a copy of my visit note below.    I saw Manfred Young today for evaluation of new onset periumbilical pain and diarrhea in the postoperative setting.  He is about 3 weeks post laparoscopic cholecystectomy for acute cholecystitis (pathology-benign).  No post operative ABX given.  He called our clinic and started a probiotic- which he thinks may be helping    Two days ago developed ache/pain along his umbilicus.  Also with watery diarrhea, about 5-6x per day. No sick contacts. Does not appear associated with food intake.  No jaundice, no discoloration of the urine.  No nausea/vomiting/bloating. No fever.  Bathing normally    PE:  /74 (BP Location: Left arm, Patient Position: Sitting, Cuff Size: Adult Regular)   Pulse 70   Ht 1.854 m (6' 1\")   Wt 93.3 kg (205 lb 11.2 oz)   SpO2 98%   BMI 27.14 kg/m    Alert, pleasant  RRR  No resp distress or wheezing  Abd is soft, nondistended. Incisions appear healed without seroma, erythema, drainage or hernia.    A/P:    2 days of abdominal pain and diarrhea.  Will order Cdiff test now.  I do not think he needs bloodwork or imaging now given his benign exam.    Has follow-up next week- will plan to keep.  Follow-up C diff testing          '  Again, thank you for allowing me to participate in the care of your patient.      Sincerely,    Ze Burrell MD    "

## 2023-08-31 NOTE — NURSING NOTE
"Chief Complaint   Patient presents with    Post-Op - General Surgery     Lap gabriel post-op, watery diarrhea       Vitals:    08/31/23 1125   BP: 111/74   BP Location: Left arm   Patient Position: Sitting   Cuff Size: Adult Regular   Pulse: 70   SpO2: 98%   Weight: 93.3 kg (205 lb 11.2 oz)   Height: 1.854 m (6' 1\")       Body mass index is 27.14 kg/m .                          Emir Santos, EMT    "

## 2023-08-31 NOTE — PROGRESS NOTES
"I saw Manfred Young today for evaluation of new onset periumbilical pain and diarrhea in the postoperative setting.  He is about 3 weeks post laparoscopic cholecystectomy for acute cholecystitis (pathology-benign).  No post operative ABX given.  He called our clinic and started a probiotic- which he thinks may be helping    Two days ago developed ache/pain along his umbilicus.  Also with watery diarrhea, about 5-6x per day. No sick contacts. Does not appear associated with food intake.  No jaundice, no discoloration of the urine.  No nausea/vomiting/bloating. No fever.  Bathing normally    PE:  /74 (BP Location: Left arm, Patient Position: Sitting, Cuff Size: Adult Regular)   Pulse 70   Ht 1.854 m (6' 1\")   Wt 93.3 kg (205 lb 11.2 oz)   SpO2 98%   BMI 27.14 kg/m    Alert, pleasant  RRR  No resp distress or wheezing  Abd is soft, nondistended. Incisions appear healed without seroma, erythema, drainage or hernia.    A/P:    2 days of abdominal pain and diarrhea.  Will order Cdiff test now.  I do not think he needs bloodwork or imaging now given his benign exam.    Has follow-up next week- will plan to keep.  Follow-up C diff testing  "

## 2023-08-31 NOTE — PROGRESS NOTES
Dr. Whitmore spoke with patient and would like him to be seen for possible c. Diff and infectious work up. Patient has been having abdominal pain and cramping as well as watery stools for the past couple of days.     Dr. Burrell agreed to see patient today at 1130. Wants c. Diff test ordered, entered. Patient will be given supplies to collect the specimen in clinic today. He is aware of this.

## 2023-08-31 NOTE — PATIENT INSTRUCTIONS
You met with Dr. Ze Burrell.      Today's visit instructions:    Please collect a stool specimen and bring it to a [facility] lab. We will notify you of the results.        If you have questions please contact Radha RN or Regina RN during regular clinic hours, Monday through Friday 7:30 AM - 4:00 PM, or you can contact us via [employer] at anytime.       If you have urgent needs after-hours, weekends, or holidays please call the hospital at [phone] and ask to speak with our on-call General Surgery Team.    Appointment [phone]  Nurse Advice (Radha or Regina): [phone]   Surgery Scheduler (Carla): [phone]  Fax: [phone]

## 2023-09-05 ENCOUNTER — VIRTUAL VISIT (OUTPATIENT)
Dept: SURGERY | Facility: CLINIC | Age: 53
End: 2023-09-05
Payer: COMMERCIAL

## 2023-09-05 VITALS — HEIGHT: 73 IN | BODY MASS INDEX: 27.17 KG/M2 | WEIGHT: 205 LBS

## 2023-09-05 DIAGNOSIS — Z98.890 POSTOPERATIVE STATE: Primary | ICD-10-CM

## 2023-09-05 PROCEDURE — 99024 POSTOP FOLLOW-UP VISIT: CPT | Mod: 95 | Performed by: SURGERY

## 2023-09-05 ASSESSMENT — PAIN SCALES - GENERAL: PAINLEVEL: NO PAIN (0)

## 2023-09-05 NOTE — NURSING NOTE
"Chief Complaint   Patient presents with    New Patient     Post-op       Vitals:    09/05/23 0716   Weight: 93 kg (205 lb)   Height: 1.854 m (6' 1\")       Body mass index is 27.05 kg/m .                          Emir Santos, EMT    "

## 2023-09-05 NOTE — LETTER
"9/5/2023       RE: Manfred Young  [address]  [facility] [address]     Dear Colleague,    Thank you for referring your patient, Manfred Young, to the [facility] GENERAL SURGERY CLINIC [place] [facility]. Please see a copy of my visit note below.    Manfred is a 52 year old who is being evaluated via a billable telephone visit.      What phone number would you like to be contacted at? [phone]  How would you like to obtain your AVS? MyChart    Distant Location (provider location):  On-site  Phone call duration: 10 minutes    During this telephone visit the patient is located in [place], patient verifies this as the location during the entirety of this visit.     Surgery Clinic Staff:    The patient was evaluated in the surgery clinic via \"telephone call.\"  The patient was in [place] during the visit. The patient understands the limits of the telephone call for surgery clinic visits. The patient is a 52-year-old man well-known to the surgery service. The patient is being evaluated following laparoscopic cholecystectomy.  The patient developed watery diarrhea following surgery.  The patient was evaluated postoperatively in the surgery clinic by Dr. Ze Burrell.  The patient is currently doing well. He now has no nausea and no vomiting; he is now tolerating a regular diet.  His bowel movements have returned to \"near normal.\"    PAST MEDICAL HISTORY:  Past Medical History:   Diagnosis Date    NO ACTIVE PROBLEMS      PAST SURGICAL HISTORY:  Past Surgical History:   Procedure Laterality Date    LAPAROSCOPIC CHOLECYSTECTOMY N/A 8/7/2023    Procedure: Laparoscopic cholecystectomy;  Surgeon: Cody Whitmore MD;  Location: [facility] OR    SURGICAL HISTORY OF -       detached retina    SURGICAL HISTORY OF -       arthroscopic knee surgery-Right x1, left x1, plica repair     MEDICATIONS:  Current Outpatient Medications   Medication    acetaminophen " (TYLENOL) 325 MG tablet    famotidine (PEPCID) 20 MG tablet    ibuprofen (ADVIL/MOTRIN) 200 MG tablet    tadalafil (CIALIS) 5 MG tablet    omeprazole (PRILOSEC) 20 MG DR capsule    oxyCODONE (ROXICODONE) 5 MG tablet    senna-docusate (SENOKOT-S/PERICOLACE) 8.6-50 MG tablet     No current facility-administered medications for this visit.     ALLERGIES:  Allergies   Allergen Reactions    Bee Venom Swelling     Gets hot    Chlorhexidine Gluconate Itching     Has a sensitivity    Penicillins Diarrhea    Wasp Venom Protein Other (See Comments)     High fever after being stung by bee.     SOCIAL HISTORY:  Social History     Socioeconomic History    Marital status:      Spouse name: None    Number of children: None    Years of education: None    Highest education level: None   Occupational History    Occupation: Build furniture     Employer: [employer]   Tobacco Use    Smoking status: Never    Smokeless tobacco: Never   Vaping Use    Vaping Use: Never used   Substance and Sexual Activity    Alcohol use: Yes    Drug use: No    Sexual activity: Yes     Partners: Female     FAMILY HISTORY:  Family History   Problem Relation Age of Onset    Cerebrovascular Disease Maternal Grandmother     Osteoporosis Mother     Lipids Father      A/P: The patient did not submit a stool sample; however, he now has a sample container should he develop watery diarrhea again, and he will submit a sample if needed.  The patient will call or return should he develop new or persistent problems.  No further surgical follow-up is required unless new issues arise or persist.        Again, thank you for allowing me to participate in the care of your patient.      Sincerely,    Cody Whitmore MD

## 2023-09-05 NOTE — PROGRESS NOTES
"Manfred is a 52 year old who is being evaluated via a billable telephone visit.      What phone number would you like to be contacted at? [phone]  How would you like to obtain your AVS? [place]    Distant Location (provider location):  On-site  Phone call duration: 10 minutes    During this telephone visit the patient is located in [place], patient verifies this as the location during the entirety of this visit.     Surgery Clinic Staff:    The patient was evaluated in the surgery clinic via \"telephone call.\"  The patient was in [place] during the visit. The patient understands the limits of the telephone call for surgery clinic visits. The patient is a 52-year-old man well-known to the surgery service. The patient is being evaluated following laparoscopic cholecystectomy.  The patient developed watery diarrhea following surgery.  The patient was evaluated postoperatively in the surgery clinic by Dr. Ze Burrell.  The patient is currently doing well. He now has no nausea and no vomiting; he is now tolerating a regular diet.  His bowel movements have returned to \"near normal.\"    PAST MEDICAL HISTORY:  Past Medical History:   Diagnosis Date    NO ACTIVE PROBLEMS      PAST SURGICAL HISTORY:  Past Surgical History:   Procedure Laterality Date    LAPAROSCOPIC CHOLECYSTECTOMY N/A 8/7/2023    Procedure: Laparoscopic cholecystectomy;  Surgeon: Cody Whitmore MD;  Location: [facility] OR    SURGICAL HISTORY OF -       detached retina    SURGICAL HISTORY OF -       arthroscopic knee surgery-Right x1, left x1, plica repair     MEDICATIONS:  Current Outpatient Medications   Medication    acetaminophen (TYLENOL) 325 MG tablet    famotidine (PEPCID) 20 MG tablet    ibuprofen (ADVIL/MOTRIN) 200 MG tablet    tadalafil (CIALIS) 5 MG tablet    omeprazole (PRILOSEC) 20 MG DR capsule    oxyCODONE (ROXICODONE) 5 MG tablet    senna-docusate (SENOKOT-S/PERICOLACE) 8.6-50 MG tablet     No current facility-administered medications for this " visit.     ALLERGIES:  Allergies   Allergen Reactions    Bee Venom Swelling     Gets hot    Chlorhexidine Gluconate Itching     Has a sensitivity    Penicillins Diarrhea    Wasp Venom Protein Other (See Comments)     High fever after being stung by bee.     SOCIAL HISTORY:  Social History     Socioeconomic History    Marital status:      Spouse name: None    Number of children: None    Years of education: None    Highest education level: None   Occupational History    Occupation: Build furniture     Employer: [employer]EL[employer]   Tobacco Use    Smoking status: Never    Smokeless tobacco: Never   Vaping Use    Vaping Use: Never used   Substance and Sexual Activity    Alcohol use: Yes    Drug use: No    Sexual activity: Yes     Partners: Female     FAMILY HISTORY:  Family History   Problem Relation Age of Onset    Cerebrovascular Disease Maternal Grandmother     Osteoporosis Mother     Lipids Father      A/P: The patient did not submit a stool sample; however, he now has a sample container should he develop watery diarrhea again, and he will submit a sample if needed.  The patient will call or return should he develop new or persistent problems.  No further surgical follow-up is required unless new issues arise or persist.      Cody Whitmore MD, PhD

## 2023-09-05 NOTE — PATIENT INSTRUCTIONS
You met with Dr. Cody Whitmore.      Today's visit instructions:    Return to the Surgery Clinic on an as needed basis.        If you have questions please contact Radha RN or Regina RN during regular clinic hours, Monday through Friday 7:30 AM - 4:00 PM, or you can contact us via [employer] at anytime.       If you have urgent needs after-hours, weekends, or holidays please call the hospital at [phone] and ask to speak with our on-call General Surgery Team.    Appointment [phone]  Nurse Advice (Radha or Regina): [phone]   Surgery Scheduler (Carla): [phone]  Fax: [phone]

## 2023-11-03 DIAGNOSIS — K21.9 GASTROESOPHAGEAL REFLUX DISEASE WITHOUT ESOPHAGITIS: ICD-10-CM

## 2023-11-06 RX ORDER — FAMOTIDINE 20 MG/1
20 TABLET, FILM COATED ORAL 2 TIMES DAILY
Qty: 180 TABLET | OUTPATIENT
Start: 2023-11-06

## 2023-11-29 ENCOUNTER — MYC MEDICAL ADVICE (OUTPATIENT)
Dept: FAMILY MEDICINE | Facility: CLINIC | Age: 53
End: 2023-11-29
Payer: COMMERCIAL

## 2023-11-29 DIAGNOSIS — N52.9 ERECTILE DYSFUNCTION, UNSPECIFIED ERECTILE DYSFUNCTION TYPE: Primary | ICD-10-CM

## 2023-11-30 RX ORDER — TADALAFIL 5 MG/1
5 TABLET ORAL DAILY
Qty: 30 TABLET | Refills: 1 | Status: SHIPPED | OUTPATIENT
Start: 2023-11-30 | End: 2024-01-09

## 2023-11-30 NOTE — TELEPHONE ENCOUNTER
DOD: pt of Bethany's, med is historical, he is now scheduled 1/9/24.  Pended for consideration.     I've scheduled an appointment, but it isn't until 1/9/24. I have to wait that long?    Misa TIMMONS RN  [facility]

## 2024-01-09 ENCOUNTER — OFFICE VISIT (OUTPATIENT)
Dept: FAMILY MEDICINE | Facility: CLINIC | Age: 54
End: 2024-01-09
Payer: COMMERCIAL

## 2024-01-09 VITALS
RESPIRATION RATE: 15 BRPM | DIASTOLIC BLOOD PRESSURE: 73 MMHG | BODY MASS INDEX: 25.45 KG/M2 | TEMPERATURE: 97.4 F | HEART RATE: 52 BPM | OXYGEN SATURATION: 100 % | SYSTOLIC BLOOD PRESSURE: 120 MMHG | HEIGHT: 73 IN | WEIGHT: 192 LBS

## 2024-01-09 DIAGNOSIS — N52.9 ERECTILE DYSFUNCTION, UNSPECIFIED ERECTILE DYSFUNCTION TYPE: ICD-10-CM

## 2024-01-09 DIAGNOSIS — Z00.00 ROUTINE ADULT HEALTH MAINTENANCE: Primary | ICD-10-CM

## 2024-01-09 DIAGNOSIS — D50.8 IRON DEFICIENCY ANEMIA SECONDARY TO INADEQUATE DIETARY IRON INTAKE: ICD-10-CM

## 2024-01-09 DIAGNOSIS — Z12.5 SCREENING FOR PROSTATE CANCER: ICD-10-CM

## 2024-01-09 PROBLEM — Z90.49 S/P CHOLECYSTECTOMY: Status: ACTIVE | Noted: 2024-01-09

## 2024-01-09 PROBLEM — K80.20 SYMPTOMATIC CHOLELITHIASIS: Status: RESOLVED | Noted: 2023-08-07 | Resolved: 2024-01-09

## 2024-01-09 LAB
ERYTHROCYTE [DISTWIDTH] IN BLOOD BY AUTOMATED COUNT: 15.5 % (ref 10–15)
HCT VFR BLD AUTO: 40.4 % (ref 40–53)
HGB BLD-MCNC: 11.9 G/DL (ref 13.3–17.7)
MCH RBC QN AUTO: 23.4 PG (ref 26.5–33)
MCHC RBC AUTO-ENTMCNC: 29.5 G/DL (ref 31.5–36.5)
MCV RBC AUTO: 79 FL (ref 78–100)
PLATELET # BLD AUTO: 293 10E3/UL (ref 150–450)
RBC # BLD AUTO: 5.09 10E6/UL (ref 4.4–5.9)
WBC # BLD AUTO: 5.8 10E3/UL (ref 4–11)

## 2024-01-09 PROCEDURE — 80048 BASIC METABOLIC PNL TOTAL CA: CPT | Performed by: PHYSICIAN ASSISTANT

## 2024-01-09 PROCEDURE — G0103 PSA SCREENING: HCPCS | Performed by: PHYSICIAN ASSISTANT

## 2024-01-09 PROCEDURE — 85027 COMPLETE CBC AUTOMATED: CPT | Performed by: PHYSICIAN ASSISTANT

## 2024-01-09 PROCEDURE — 36415 COLL VENOUS BLD VENIPUNCTURE: CPT | Performed by: PHYSICIAN ASSISTANT

## 2024-01-09 PROCEDURE — 80061 LIPID PANEL: CPT | Performed by: PHYSICIAN ASSISTANT

## 2024-01-09 PROCEDURE — 99396 PREV VISIT EST AGE 40-64: CPT | Mod: 25 | Performed by: PHYSICIAN ASSISTANT

## 2024-01-09 PROCEDURE — 99213 OFFICE O/P EST LOW 20 MIN: CPT | Mod: 25 | Performed by: PHYSICIAN ASSISTANT

## 2024-01-09 RX ORDER — TADALAFIL 10 MG/1
10 TABLET ORAL DAILY
Qty: 90 TABLET | Refills: 3 | Status: SHIPPED | OUTPATIENT
Start: 2024-01-09

## 2024-01-09 ASSESSMENT — ENCOUNTER SYMPTOMS
MYALGIAS: 0
CHILLS: 0
PALPITATIONS: 0
NERVOUS/ANXIOUS: 0
NAUSEA: 0
FEVER: 0
HEADACHES: 0
JOINT SWELLING: 0
EYE PAIN: 0
SORE THROAT: 0
DIARRHEA: 0
HEMATURIA: 0
FREQUENCY: 0
COUGH: 0
WEAKNESS: 0
PARESTHESIAS: 0
ARTHRALGIAS: 0
DIZZINESS: 0
SHORTNESS OF BREATH: 0
DYSURIA: 0
HEARTBURN: 0
CONSTIPATION: 0
HEMATOCHEZIA: 0
ABDOMINAL PAIN: 0

## 2024-01-09 ASSESSMENT — PAIN SCALES - GENERAL: PAINLEVEL: NO PAIN (0)

## 2024-01-09 NOTE — PROGRESS NOTES
SUBJECTIVE:   Manfred is a 53 year old, presenting for the following:  Physical (Physical )        1/9/2024     3:37 PM   Additional Questions   Roomed by Bethany Diaz     Healthy Habits:     Getting at least 3 servings of Calcium per day:  Yes    Bi-annual eye exam:  Yes    Dental care twice a year:  Yes    Sleep apnea or symptoms of sleep apnea:  None    Diet:  Vegetarian/vegan    Frequency of exercise:  4-5 days/week    Duration of exercise:  30-45 minutes    Taking medications regularly:  Yes    Medication side effects:  None    Additional concerns today:  No    Manfred here today for [facility] visit  Notes that tadalafil 5mg doesn't work consistently every time, wonders about higher dose    Have you ever done Advance Care Planning? (For example, a Health Directive, POLST, or a discussion with a medical provider or your loved ones about your wishes): No, advance care planning information given to patient to review.  Patient plans to discuss their wishes with loved ones or provider.      Social History     Tobacco Use    Smoking status: Never    Smokeless tobacco: Never   Substance Use Topics    Alcohol use: Yes         1/9/2024     3:31 PM   Alcohol Use   Prescreen: >3 drinks/day or >7 drinks/week? No          No data to display              Last PSA:   Prostate Specific Antigen Screen   Date Value Ref Range Status   12/20/2022 1.44 0.00 - 3.50 ng/mL Final       Reviewed orders with patient. Reviewed health maintenance and updated orders accordingly - Yes    Reviewed and updated as needed this visit by clinical staff   Tobacco  Allergies  Meds  Problems  Med Hx  Surg Hx  Fam Hx          Reviewed and updated as needed this visit by Provider   Tobacco   Meds  Problems  Med Hx  Surg Hx  Fam Hx           Review of Systems   Constitutional:  Negative for chills and fever.   HENT:  Negative for congestion, ear pain, hearing loss and sore throat.    Eyes:  Positive for visual disturbance. Negative for pain.  "  Respiratory:  Negative for cough and shortness of breath.    Cardiovascular:  Negative for chest pain, palpitations and peripheral edema.   Gastrointestinal:  Negative for abdominal pain, constipation, diarrhea, heartburn, hematochezia and nausea.   Genitourinary:  Negative for dysuria, frequency, genital sores, hematuria, impotence, penile discharge and urgency.   Musculoskeletal:  Negative for arthralgias, joint swelling and myalgias.   Skin:  Negative for rash.   Neurological:  Negative for dizziness, weakness, headaches and paresthesias.   Psychiatric/Behavioral:  Negative for mood changes. The patient is not nervous/anxious.        OBJECTIVE:   /73 (BP Location: Right arm, Patient Position: Sitting, Cuff Size: Adult Regular)   Pulse 52   Temp 97.4  F (36.3  C) (Temporal)   Resp 15   Ht 1.854 m (6' 1\")   Wt 87.1 kg (192 lb)   SpO2 100%   BMI 25.33 kg/m      Physical Exam  GENERAL: healthy, alert and no distress  EYES: Eyes grossly normal to inspection, PERRL and conjunctivae and sclerae normal  HENT: ear canals and TM's normal, nose and mouth without ulcers or lesions  NECK: no adenopathy, no asymmetry, masses, or scars and thyroid normal to palpation  RESP: lungs clear to auscultation - no rales, rhonchi or wheezes  CV: regular rate and rhythm, normal S1 S2, no S3 or S4, no murmur, click or rub, no peripheral edema and peripheral pulses strong  ABDOMEN: soft, nontender, no hepatosplenomegaly, no masses and bowel sounds normal  MS: no gross musculoskeletal defects noted, no edema  SKIN: no suspicious lesions or rashes  NEURO: Normal strength and tone, mentation intact and speech normal  PSYCH: mentation appears normal, affect normal/bright      ASSESSMENT/PLAN:   Manfred was seen today for physical.    Diagnoses and all orders for this visit:    Routine adult health maintenance  -     REVIEW OF HEALTH MAINTENANCE PROTOCOL ORDERS  -     PRIMARY CARE FOLLOW-UP SCHEDULING; Future  -     CBC with " "platelets; Future  -     Basic metabolic panel; Future  -     Lipid panel reflex to direct LDL Non-fasting; Future  -     CBC with platelets  -     Basic metabolic panel  -     Lipid panel reflex to direct LDL Non-fasting    Erectile dysfunction, unspecified erectile dysfunction type - agree to dose increase to 10mg daily, refills sent.   -     tadalafil (CIALIS) 10 MG tablet; Take 1 tablet (10 mg) by mouth daily Take 1 tablet by mouth daily    Screening for prostate cancer  -     PROSTATE SPEC ANTIGEN SCREEN; Future  -     PROSTATE SPEC ANTIGEN SCREEN      COUNSELING:   Reviewed preventive health counseling, as reflected in patient instructions      BMI:   Estimated body mass index is 25.33 kg/m  as calculated from the following:    Height as of this encounter: 1.854 m (6' 1\").    Weight as of this encounter: 87.1 kg (192 lb).       He reports that he has never smoked. He has never used smokeless tobacco.            [facility]  "

## 2024-01-10 PROBLEM — D50.8 IRON DEFICIENCY ANEMIA SECONDARY TO INADEQUATE DIETARY IRON INTAKE: Status: ACTIVE | Noted: 2024-01-10

## 2024-01-10 LAB
ANION GAP SERPL CALCULATED.3IONS-SCNC: 7 MMOL/L (ref 7–15)
BUN SERPL-MCNC: 13.5 MG/DL (ref 6–20)
CALCIUM SERPL-MCNC: 9.2 MG/DL (ref 8.6–10)
CHLORIDE SERPL-SCNC: 105 MMOL/L (ref 98–107)
CHOLEST SERPL-MCNC: 159 MG/DL
CREAT SERPL-MCNC: 0.91 MG/DL (ref 0.67–1.17)
DEPRECATED HCO3 PLAS-SCNC: 29 MMOL/L (ref 22–29)
EGFRCR SERPLBLD CKD-EPI 2021: >90 ML/MIN/1.73M2
FASTING STATUS PATIENT QL REPORTED: NO
GLUCOSE SERPL-MCNC: 94 MG/DL (ref 70–99)
HDLC SERPL-MCNC: 46 MG/DL
LDLC SERPL CALC-MCNC: 83 MG/DL
NONHDLC SERPL-MCNC: 113 MG/DL
POTASSIUM SERPL-SCNC: 3.9 MMOL/L (ref 3.4–5.3)
PSA SERPL DL<=0.01 NG/ML-MCNC: 1.89 NG/ML (ref 0–3.5)
SODIUM SERPL-SCNC: 141 MMOL/L (ref 135–145)
TRIGL SERPL-MCNC: 151 MG/DL

## 2024-01-12 ENCOUNTER — TELEPHONE (OUTPATIENT)
Dept: FAMILY MEDICINE | Facility: CLINIC | Age: 54
End: 2024-01-12
Payer: COMMERCIAL

## 2024-01-12 NOTE — TELEPHONE ENCOUNTER
"Can you let pt know that insurance doesn't cover that salt formulation of iron supplement - he can purchase OTC (ferrous fumarate - purchase \"ferrett's iron\" and take every other day)    I could prescribe ferrous sulfate but this is harder on the stomach & causes more constipation    Bethany Graham PA-C    "

## 2024-01-12 NOTE — TELEPHONE ENCOUNTER
This Rx for ferrous fumarate-vitamin C ER (JUSTICE-SEQUELS) 65-25 MG CR tablet cannot be ordered.  It is not covered by ins.  Please put in a new Rx for an alternative.

## 2024-01-12 NOTE — TELEPHONE ENCOUNTER
"Writer called and left message on patient's voicemail to call back and speak with a triage nurse.  ok to leave a voicemail    Writer left message:   Insurance doesn't cover that salt formulation of iron supplement. You can purchase OTC ferrous fumarate. Purchase \"Ferretts' iron\" and take every other day.     Writer responded via [employer].    DARWIN PrasadN DAMIAN  [facility]    "

## 2024-02-01 RX ORDER — FAMOTIDINE 20 MG/1
20 TABLET, FILM COATED ORAL 2 TIMES DAILY
Qty: 180 TABLET | OUTPATIENT
Start: 2024-02-01

## 2024-03-01 DIAGNOSIS — M25.569 KNEE PAIN: Primary | ICD-10-CM

## 2024-03-04 NOTE — PROGRESS NOTES
ASSESSMENT/PLAN:    (S83.421A) Sprain of lateral collateral ligament of right knee, initial encounter  (primary encounter diagnosis)  Comment: exam consistent w/ LCL sprain +/- LM injury; we discussed option of further imaging vs trial of bracing/PT and elected on the latter; placed in hinged brace; will start PT; precautions/ activity modifications reviewed; f/up in 6 wks; if no better, would check MRI  Plan: Physical Therapy  Referral, Ankle/Knee        Bracing Supplies Order Hinged Knee Brace; Right          Bean Howard MD  March 5, 2024  12:33 PM        Pt is a 53 year old male here today for:   Today, the patient reports that he has had right knee pain since 8 weeks ago. He states that he was stretching crossed legged. Pain is located over the lateral aspect of the knee. Pain is worse with squatting and kneeling. He has taken a break from pickle ball and roller skating. OK to bike and elliptical.   HPI:   Right Knee pain :   Duration? 8 weeks   Injury/ Inciting activity? Stretching   Pop? No   Swelling? Initially   Limited motion? No   Locking/ Catching? No   Giving way/ instability? No   Imaging? Yes, Xr obtained today   Treatment? Ice and neoprene sleeve     Past Medical History:   Diagnosis Date    NO ACTIVE PROBLEMS     Symptomatic cholelithiasis       Past Surgical History:   Procedure Laterality Date    LAPAROSCOPIC CHOLECYSTECTOMY N/A 8/7/2023    Procedure: Laparoscopic cholecystectomy;  Surgeon: Cody Whitmore MD;  Location: [facility] OR    SURGICAL HISTORY OF -       detached retina    SURGICAL HISTORY OF -       arthroscopic knee surgery-Right x1, left x1, plica repair      Current Outpatient Medications   Medication Sig Dispense Refill    famotidine (PEPCID) 20 MG tablet Take 1 tablet (20 mg) by mouth 2 times daily 120 tablet 0    ferrous fumarate-vitamin C ER (JUSTICE-SEQUELS) 65-25 MG CR tablet Take 1 tablet by mouth every other day 45 tablet 3    tadalafil (CIALIS) 10 MG tablet Take 1 tablet  (10 mg) by mouth daily Take 1 tablet by mouth daily 90 tablet 3      Allergies   Allergen Reactions    Bee Venom Swelling     Gets hot    Chlorhexidine Gluconate Itching     Has a sensitivity    Penicillins Diarrhea    Wasp Venom Protein Other (See Comments)     High fever after being stung by bee.      ROS:   Gen- no fevers/chills   Rheum - no morning stiffness   Derm - no rash/ redness   Neuro - no numbness, no tingling   Remainder of ROS negative.     Exam:   There were no vitals taken for this visit.       R Knee:   ROM: 0-130; Crepitus: NO   Effusion: NO ; Swelling: NO   Strength: Full in flexion/ extension   Tenderness: Patella - No Medial joint line - NO; Lateral joint line - YES; Quad tendon - NO; Patellar tendon- NO; Hamstring - NO; +TTP over lateral femoral condyle and fibular head.   Cruciates: anterior drawer - neg/posterior drawer -neg. Lachman - neg   Collaterals: varus -POS/valgus -neg.   Patella: patellar compression - neg; single leg bend- neg   Meniscus: Bel - POS laterally; Thessaly - neg   Maneuvers: Ora - neg     Xray of R knee on March 5, 2024 at [facility] location - films personally reviewed with patient at time of visit     My impression: Neg

## 2024-03-05 ENCOUNTER — PRE VISIT (OUTPATIENT)
Dept: ORTHOPEDICS | Facility: CLINIC | Age: 54
End: 2024-03-05

## 2024-03-05 ENCOUNTER — OFFICE VISIT (OUTPATIENT)
Dept: ORTHOPEDICS | Facility: CLINIC | Age: 54
End: 2024-03-05
Payer: COMMERCIAL

## 2024-03-05 ENCOUNTER — ANCILLARY PROCEDURE (OUTPATIENT)
Dept: GENERAL RADIOLOGY | Facility: CLINIC | Age: 54
End: 2024-03-05
Attending: FAMILY MEDICINE
Payer: COMMERCIAL

## 2024-03-05 DIAGNOSIS — M25.569 KNEE PAIN: ICD-10-CM

## 2024-03-05 DIAGNOSIS — S83.421A SPRAIN OF LATERAL COLLATERAL LIGAMENT OF RIGHT KNEE, INITIAL ENCOUNTER: Primary | ICD-10-CM

## 2024-03-05 PROCEDURE — 99213 OFFICE O/P EST LOW 20 MIN: CPT | Performed by: FAMILY MEDICINE

## 2024-03-05 PROCEDURE — 73562 X-RAY EXAM OF KNEE 3: CPT | Mod: RT | Performed by: RADIOLOGY

## 2024-03-05 NOTE — LETTER
3/5/2024      RE: Manfred Young  [address]  [facility] [address]     Dear Colleague,    Thank you for referring your patient, Manfred Young, to the [facility] SP[facility]INE [facility]IN[facility] [place]. Please see a copy of my visit note below.    ASSESSMENT/PLAN:    (S88.771B) Sprain of lateral collateral ligament of right knee, initial encounter  (primary encounter diagnosis)  Comment: exam consistent w/ LCL sprain +/- LM injury; we discussed option of further imaging vs trial of bracing/PT and elected on the latter; placed in hinged brace; will start PT; precautions/ activity modifications reviewed; f/up in 6 wks; if no better, would check MRI  Plan: Physical Therapy  Referral, Ankle/Knee        Bracing Supplies Order Hinged Knee Brace; Right          Bean Howard MD  March 5, 2024  12:33 PM        Pt is a 53 year old male here today for:   Today, the patient reports that he has had right knee pain since 8 weeks ago. He states that he was stretching crossed legged. Pain is located over the lateral aspect of the knee. Pain is worse with squatting and kneeling. He has taken a break from pickle ball and roller skating. OK to bike and elliptical.   HPI:   Right Knee pain :   Duration? 8 weeks   Injury/ Inciting activity? Stretching   Pop? No   Swelling? Initially   Limited motion? No   Locking/ Catching? No   Giving way/ instability? No   Imaging? Yes, Xr obtained today   Treatment? Ice and neoprene sleeve     Past Medical History:   Diagnosis Date     NO ACTIVE PROBLEMS      Symptomatic cholelithiasis       Past Surgical History:   Procedure Laterality Date     LAPAROSCOPIC CHOLECYSTECTOMY N/A 8/7/2023    Procedure: Laparoscopic cholecystectomy;  Surgeon: Cody Whitmore MD;  Location: [facility] OR     SURGICAL HISTORY OF -       detached retina     SURGICAL HISTORY OF -       arthroscopic knee surgery-Right x1, left x1, plica repair      Current Outpatient Medications   Medication Sig  Dispense Refill     famotidine (PEPCID) 20 MG tablet Take 1 tablet (20 mg) by mouth 2 times daily 120 tablet 0     ferrous fumarate-vitamin C ER (JUSTICE-SEQUELS) 65-25 MG CR tablet Take 1 tablet by mouth every other day 45 tablet 3     tadalafil (CIALIS) 10 MG tablet Take 1 tablet (10 mg) by mouth daily Take 1 tablet by mouth daily 90 tablet 3      Allergies   Allergen Reactions     Bee Venom Swelling     Gets hot     Chlorhexidine Gluconate Itching     Has a sensitivity     Penicillins Diarrhea     Wasp Venom Protein Other (See Comments)     High fever after being stung by bee.      ROS:   Gen- no fevers/chills   Rheum - no morning stiffness   Derm - no rash/ redness   Neuro - no numbness, no tingling   Remainder of ROS negative.     Exam:   There were no vitals taken for this visit.       R Knee:   ROM: 0-130; Crepitus: NO   Effusion: NO ; Swelling: NO   Strength: Full in flexion/ extension   Tenderness: Patella - No Medial joint line - NO; Lateral joint line - YES; Quad tendon - NO; Patellar tendon- NO; Hamstring - NO; +TTP over lateral femoral condyle and fibular head.   Cruciates: anterior drawer - neg/posterior drawer -neg. Lachman - neg   Collaterals: varus -POS/valgus -neg.   Patella: patellar compression - neg; single leg bend- neg   Meniscus: Bel - POS laterally; Thessaly - neg   Maneuvers: Ora - neg     [place] on [place] 5[place] [facility] [place] personally reviewed with patient at time of visit     My impression: Neg         Again, thank you for allowing me to participate in the care of your patient.      Sincerely,    Bean Howard MD

## 2024-03-05 NOTE — TELEPHONE ENCOUNTER
DIAGNOSIS: (R) knee pain x5 weeks, self referred, no images, [facility]     APPOINTMENT DATE: 3.5.24   NOTES STATUS DETAILS   MEDICATION LIST Internal    XRAYS (IMAGES & REPORTS) In process *sched* for 3.5.24  XR Knee Right

## 2024-03-11 ENCOUNTER — THERAPY VISIT (OUTPATIENT)
Dept: PHYSICAL THERAPY | Facility: CLINIC | Age: 54
End: 2024-03-11
Attending: FAMILY MEDICINE
Payer: COMMERCIAL

## 2024-03-11 DIAGNOSIS — M25.561 ACUTE PAIN OF RIGHT KNEE: Primary | ICD-10-CM

## 2024-03-11 DIAGNOSIS — S83.421A SPRAIN OF LATERAL COLLATERAL LIGAMENT OF RIGHT KNEE, INITIAL ENCOUNTER: ICD-10-CM

## 2024-03-11 PROCEDURE — 97161 PT EVAL LOW COMPLEX 20 MIN: CPT | Mod: GP | Performed by: PHYSICAL THERAPIST

## 2024-03-11 PROCEDURE — 97110 THERAPEUTIC EXERCISES: CPT | Mod: GP | Performed by: PHYSICAL THERAPIST

## 2024-03-11 ASSESSMENT — ACTIVITIES OF DAILY LIVING (ADL)
SQUAT: ACTIVITY IS FAIRLY DIFFICULT
GIVING WAY, BUCKLING OR SHIFTING OF KNEE: I DO NOT HAVE THE SYMPTOM
SIT WITH YOUR KNEE BENT: ACTIVITY IS NOT DIFFICULT
LIMPING: I DO NOT HAVE THE SYMPTOM
AS_A_RESULT_OF_YOUR_KNEE_INJURY,_HOW_WOULD_YOU_RATE_YOUR_CURRENT_LEVEL_OF_DAILY_ACTIVITY?: ABNORMAL
RAW_SCORE: 58
GO UP STAIRS: ACTIVITY IS NOT DIFFICULT
KNEE_ACTIVITY_OF_DAILY_LIVING_SUM: 58
HOW_WOULD_YOU_RATE_THE_CURRENT_FUNCTION_OF_YOUR_KNEE_DURING_YOUR_USUAL_DAILY_ACTIVITIES_ON_A_SCALE_FROM_0_TO_100_WITH_100_BEING_YOUR_LEVEL_OF_KNEE_FUNCTION_PRIOR_TO_YOUR_INJURY_AND_0_BEING_THE_INABILITY_TO_PERFORM_ANY_OF_YOUR_USUAL_DAILY_ACTIVITIES?: 60
STIFFNESS: THE SYMPTOM AFFECTS MY ACTIVITY MODERATELY
RISE FROM A CHAIR: ACTIVITY IS NOT DIFFICULT
GO UP STAIRS: ACTIVITY IS NOT DIFFICULT
WALK: ACTIVITY IS NOT DIFFICULT
SIT WITH YOUR KNEE BENT: ACTIVITY IS NOT DIFFICULT
RISE FROM A CHAIR: ACTIVITY IS NOT DIFFICULT
GO DOWN STAIRS: ACTIVITY IS NOT DIFFICULT
SWELLING: I DO NOT HAVE THE SYMPTOM
STIFFNESS: THE SYMPTOM AFFECTS MY ACTIVITY MODERATELY
KNEEL ON THE FRONT OF YOUR KNEE: ACTIVITY IS FAIRLY DIFFICULT
GIVING WAY, BUCKLING OR SHIFTING OF KNEE: I DO NOT HAVE THE SYMPTOM
KNEE_ACTIVITY_OF_DAILY_LIVING_SCORE: 82.86
STAND: ACTIVITY IS NOT DIFFICULT
PAIN: THE SYMPTOM AFFECTS MY ACTIVITY MODERATELY
SQUAT: ACTIVITY IS FAIRLY DIFFICULT
PLEASE_INDICATE_YOR_PRIMARY_REASON_FOR_REFERRAL_TO_THERAPY:: KNEE
HOW_WOULD_YOU_RATE_THE_OVERALL_FUNCTION_OF_YOUR_KNEE_DURING_YOUR_USUAL_DAILY_ACTIVITIES?: ABNORMAL
GO DOWN STAIRS: ACTIVITY IS NOT DIFFICULT
KNEEL ON THE FRONT OF YOUR KNEE: ACTIVITY IS FAIRLY DIFFICULT
LIMPING: I DO NOT HAVE THE SYMPTOM
WEAKNESS: I DO NOT HAVE THE SYMPTOM
HOW_WOULD_YOU_RATE_THE_OVERALL_FUNCTION_OF_YOUR_KNEE_DURING_YOUR_USUAL_DAILY_ACTIVITIES?: ABNORMAL
STAND: ACTIVITY IS NOT DIFFICULT
AS_A_RESULT_OF_YOUR_KNEE_INJURY,_HOW_WOULD_YOU_RATE_YOUR_CURRENT_LEVEL_OF_DAILY_ACTIVITY?: ABNORMAL
HOW_WOULD_YOU_RATE_THE_CURRENT_FUNCTION_OF_YOUR_KNEE_DURING_YOUR_USUAL_DAILY_ACTIVITIES_ON_A_SCALE_FROM_0_TO_100_WITH_100_BEING_YOUR_LEVEL_OF_KNEE_FUNCTION_PRIOR_TO_YOUR_INJURY_AND_0_BEING_THE_INABILITY_TO_PERFORM_ANY_OF_YOUR_USUAL_DAILY_ACTIVITIES?: 60
SWELLING: I DO NOT HAVE THE SYMPTOM
WALK: ACTIVITY IS NOT DIFFICULT
PAIN: THE SYMPTOM AFFECTS MY ACTIVITY MODERATELY
WEAKNESS: I DO NOT HAVE THE SYMPTOM

## 2024-03-11 NOTE — PROGRESS NOTES
"PHYSICAL THERAPY EVALUATION  Type of Visit: Evaluation    See electronic medical record for Abuse and Falls Screening details.    Subjective   Onset of R lateral knee pain 2 months ago while stretching cross-legged, he heard a pop and had immediate pain. Worse with squatting and kneeling or lying on right side. Can ache at rest as well.  Wearing a hinged brace. Has taken a break from his normal pickleball and roller-skating. Better with rest and ibuprofen. Has been 8 weeks and he continues to have pain. Patient denies back pain.        Presenting condition or subjective complaint: LCL injury  Date of onset: 01/11/24 (\"2 months ago\")    Relevant medical history:     Dates & types of surgery:      Prior diagnostic imaging/testing results: X-ray     Prior therapy history for the same diagnosis, illness or injury: No      Prior Level of Function  Transfers: Independent  Ambulation: Independent  ADL: Independent  IADL:     Living Environment  Social support: With a significant other or spouse   Type of home: House   Stairs to enter the home:         Ramp: No   Stairs inside the home: Yes 13 Is there a railing: Yes   Help at home: None  Equipment owned:       Employment: Yes   Hobbies/Interests: Biking, hiking, swimming, pickleball, rollerskating, music, cooking    Patient goals for therapy: My normal exercise of pickleball and roller skating, squat without pain    Pain assessment: See objective evaluation for additional pain details     Objective   KNEE EVALUATION  PAIN: Pain Level at Rest: 3/10  Pain Level with Use: 6/10  Pain Location: lateral right knee pain  Pain Quality: Aching  Pain Frequency: intermittent  Pain is Worst: daytime  Pain is Exacerbated By: squatting, kneeling, lying on right side  Pain is Relieved By: rest  Pain Progression: Unchanged  INTEGUMENTARY (edema, incisions): WNL  GAIT: WNL  BALANCE/PROPRIOCEPTION: Single Leg Stance Eyes Open (seconds): >30\", Single Leg Stance Eyes Closed " "(seconds): 2\"    ROM: AROM WNL  PROM WNL    STRENGTH:   Pain: - none + mild ++ moderate +++ severe  Strength Scale: 0-5/5 Left Right   Knee Flexion 5 5-, + (mild)   Knee Extension 5 5-, + (mild)   Hip Abduction 5 4-     FLEXIBILITY:  B decreased HS length  SPECIAL TESTS:  Pain with varus stress test in 30 degrees on L   FUNCTIONAL TESTS: Double Leg Squat: Anterior knee translation  Single Leg Squat: Anterior knee translation, Knee valgus, and Improper use of glutes/hips  PALPATION:  TTP to fibular head, lateral joint line, and LCL    Assessment & Plan   CLINICAL IMPRESSIONS  Medical Diagnosis: Sprain of lateral collateral ligament of right knee    Treatment Diagnosis: R knee pain   Impression/Assessment: Patient is a 53 year old male with R knee complaints.  The following significant findings have been identified: Pain, Decreased ROM/flexibility, Decreased strength, Decreased proprioception, and Decreased activity tolerance. These impairments interfere with their ability to perform work tasks, recreational activities, household chores, driving , household mobility, and community mobility as compared to previous level of function.     Clinical Decision Making (Complexity):  Clinical Presentation: Stable/Uncomplicated  Clinical Presentation Rationale: based on medical and personal factors listed in PT evaluation  Clinical Decision Making (Complexity): Low complexity    PLAN OF CARE  Treatment Interventions:  Interventions: Manual Therapy, Neuromuscular Re-education, Therapeutic Activity, Therapeutic Exercise, Self-Care/Home Management    Long Term Goals     PT Goal 1  Goal Identifier: squatting  Goal Description: Patient able to squat with 0/10 pain and good mechanics.  Rationale: to maximize safety and independence with performance of ADLs and functional tasks;to maximize safety and independence within the community;to maximize safety and independence with self cares  Target Date: 04/22/24      Frequency of " "Treatment: 1x/week  Duration of Treatment: 6 weeks    Recommended Referrals to Other Professionals:  none  Education Assessment:   Learner/Method: Patient;No Barriers to Learning    Risks and benefits of evaluation/treatment have been explained.   Patient/Family/caregiver agrees with Plan of Care.     Evaluation Time:     PT Eval, Low Complexity Minutes (48666): 20       Signing Clinician: Alyson Wilder PT      [facility]                                                                                   OUTPATIENT PHYSICAL THERAPY      PLAN OF TREATMENT FOR OUTPATIENT REHABILITATION   Patient's Last Name, First Name, Manfred Lezama YOB: 1970   Provider's Name   [facility]   Medical Record No.  [id number]     Onset Date: 01/11/24 (\"2 months ago\")  Start of Care Date: 03/11/24     Medical Diagnosis:  Sprain of lateral collateral ligament of right knee      PT Treatment Diagnosis:  R knee pain Plan of Treatment  Frequency/Duration: 1x/week/ 6 weeks    Certification date from 03/11/24 to 04/22/24         See note for plan of treatment details and functional goals     Alyson Wilder PT                         I CERTIFY THE NEED FOR THESE SERVICES FURNISHED UNDER        THIS PLAN OF TREATMENT AND WHILE UNDER MY CARE     (Physician attestation of this document indicates review and certification of the therapy plan).              Referring Provider:  Bean Howard    Initial Assessment  See Epic Evaluation- Start of Care Date: 03/11/24                "

## 2024-03-18 ENCOUNTER — THERAPY VISIT (OUTPATIENT)
Dept: PHYSICAL THERAPY | Facility: CLINIC | Age: 54
End: 2024-03-18
Payer: COMMERCIAL

## 2024-03-18 DIAGNOSIS — M25.561 ACUTE PAIN OF RIGHT KNEE: Primary | ICD-10-CM

## 2024-03-18 PROCEDURE — 97110 THERAPEUTIC EXERCISES: CPT | Mod: GP | Performed by: PHYSICAL THERAPIST

## 2024-03-29 ENCOUNTER — THERAPY VISIT (OUTPATIENT)
Dept: PHYSICAL THERAPY | Facility: CLINIC | Age: 54
End: 2024-03-29
Payer: COMMERCIAL

## 2024-03-29 DIAGNOSIS — M25.561 ACUTE PAIN OF RIGHT KNEE: Primary | ICD-10-CM

## 2024-03-29 PROCEDURE — 97110 THERAPEUTIC EXERCISES: CPT | Mod: GP

## 2024-06-03 NOTE — PROGRESS NOTES
Discharge Note    Progress reporting period is from initial evaluation date (please see noted date below) to Mar 29, 2024.  Linked Episodes   Type: Episode: Status: Noted: Resolved: Last update: Updated by:   PHYSICAL THERAPY R knee pain 3-11-24 Active 3/11/2024  3/29/2024 11:34 AM Alyson Wilder PT      Comments:       Manfred failed to follow up and current status is unknown.  Please see information below for last relevant information on current status.  Patient seen for 3  visits.    SUBJECTIVE  Subjective changes noted by patient:     .Feels he is a little more sore this week, partially he is moving and doing a lot with that, but also felt the hip extension stretch aggravated things so he stopped this. Has continued with SLR abduction, he thinks this helps.   Current pain level is  .     Previous pain level was   .   Changes in function:  Yes (See Goal flowsheet attached for changes in current functional level)  Adverse reaction to treatment or activity: None    OBJECTIVE  Changes noted in objective findings:     Passive knee felxion and extension w OP pain free; painful lateral knee with hip IR>ER     ASSESSMENT/PLAN      Updated problem list and treatment plan:   Pain - HEP  Decreased ROM/flexibility - HEP  Decreased function - HEP  Decreased strength - HEP  STG/LTGs have been met or progress has been made towards goals:  Yes, please see goal flowsheet for most current information  Assessment of Progress: current status is unknown.    Last current status:     Self Management Plans:  HEP  I have re-evaluated this patient and find that the nature, scope, duration and intensity of the therapy is appropriate for the medical condition of the patient.  Manfred continues to require the following intervention to meet STG and LTG's:  HEP.    Recommendations:  Discharge with current home program.  Patient to follow up with MD as needed.    Please refer to the daily flowsheet for treatment today, total treatment time and  time spent performing 1:1 timed codes.

## 2024-06-05 ENCOUNTER — TELEPHONE (OUTPATIENT)
Dept: CALL CENTER | Age: 54
End: 2024-06-05
Payer: COMMERCIAL

## 2024-06-05 NOTE — TELEPHONE ENCOUNTER
Ok to scheduled second opinion light adjustable lens/Cataract surgery with Dr. Powers, Dr. Bocanegra or Dr. Tariq who perform light adjustable lens cataract surgery.    Around 3200$ per eye.     Called Manfred at 930 am - he was not able to talk when I called '    Clinic will try again later

## 2024-06-05 NOTE — TELEPHONE ENCOUNTER
Ok to scheduled second opinion light adjustable lens/Cataract surgery with Dr. Powers, Dr. Bocanegra or Dr. Tariq who perform light adjustable lens cataract surgery.    Around 3200$ per eye.    Note to patient communicator to assist with contacting with information/offering consult.    Manuel Calvert RN 8:40 AM 06/05/24

## 2024-06-05 NOTE — TELEPHONE ENCOUNTER
Health Call Center    Phone Message    May a detailed message be left on voicemail: yes     Reason for Call: Other: Patient had a cataract eval at [facility] and would like a second opinion with us. Patient needs light adjustable lens and would like to know if we offer them at our clinic. If yes, how much would like cost for the lens.     Please call patient back at [phone]. Thank you.    Action Taken: Message routed to:  [facility]    Travel Screening: Not Applicable     Date of Service:

## 2024-06-18 DIAGNOSIS — H26.9 CATARACT: Primary | ICD-10-CM

## 2024-06-19 ENCOUNTER — OFFICE VISIT (OUTPATIENT)
Dept: OPHTHALMOLOGY | Facility: CLINIC | Age: 54
End: 2024-06-19
Attending: OPHTHALMOLOGY
Payer: COMMERCIAL

## 2024-06-19 DIAGNOSIS — H35.372 EPIRETINAL MEMBRANE (ERM) OF LEFT EYE: ICD-10-CM

## 2024-06-19 DIAGNOSIS — H25.813 COMBINED FORMS OF AGE-RELATED CATARACT OF BOTH EYES: Primary | ICD-10-CM

## 2024-06-19 DIAGNOSIS — Z86.69 HISTORY OF RETINAL DETACHMENT: ICD-10-CM

## 2024-06-19 DIAGNOSIS — H52.13 MYOPIA OF BOTH EYES WITH ASTIGMATISM AND PRESBYOPIA: ICD-10-CM

## 2024-06-19 DIAGNOSIS — H52.203 MYOPIA OF BOTH EYES WITH ASTIGMATISM AND PRESBYOPIA: ICD-10-CM

## 2024-06-19 DIAGNOSIS — H52.4 MYOPIA OF BOTH EYES WITH ASTIGMATISM AND PRESBYOPIA: ICD-10-CM

## 2024-06-19 DIAGNOSIS — H26.9 CATARACT: ICD-10-CM

## 2024-06-19 PROCEDURE — 99204 OFFICE O/P NEW MOD 45 MIN: CPT | Performed by: OPHTHALMOLOGY

## 2024-06-19 PROCEDURE — 92134 CPTRZ OPH DX IMG PST SGM RTA: CPT | Performed by: OPHTHALMOLOGY

## 2024-06-19 PROCEDURE — G0463 HOSPITAL OUTPT CLINIC VISIT: HCPCS | Performed by: OPHTHALMOLOGY

## 2024-06-19 PROCEDURE — 76519 ECHO EXAM OF EYE: CPT | Performed by: OPHTHALMOLOGY

## 2024-06-19 ASSESSMENT — REFRACTION_MANIFEST
OS_SPHERE: -7.75
OD_AXIS: 060
OD_CYLINDER: +0.75
OS_CYLINDER: +2.25
OD_SPHERE: -5.25
OS_AXIS: 098

## 2024-06-19 ASSESSMENT — CONF VISUAL FIELD
OD_INFERIOR_NASAL_RESTRICTION: 0
OS_INFERIOR_TEMPORAL_RESTRICTION: 0
OD_INFERIOR_TEMPORAL_RESTRICTION: 0
OD_SUPERIOR_NASAL_RESTRICTION: 0
OS_NORMAL: 1
OD_SUPERIOR_TEMPORAL_RESTRICTION: 0
OS_INFERIOR_NASAL_RESTRICTION: 0
OS_SUPERIOR_NASAL_RESTRICTION: 0
METHOD: COUNTING FINGERS
OD_NORMAL: 1
OS_SUPERIOR_TEMPORAL_RESTRICTION: 0

## 2024-06-19 ASSESSMENT — VISUAL ACUITY
OD_PH_CC: 20/25
OD_PH_CC+: -1
OD_CC+: -1
OD_BAT_MED: 20/50
OS_BAT_MED: 20/30
OS_CC: 20/30
OD_BAT_LOW: 20/40+2
OS_CC+: -2
METHOD_MR: DIAGNOSTIC
OS_BAT_LOW: 20/30
OD_CC: 20/60
OS_BAT_HIGH: 20/40-1
METHOD: SNELLEN - LINEAR
OD_BAT_HIGH: 20/70+2
CORRECTION_TYPE: GLASSES

## 2024-06-19 ASSESSMENT — REFRACTION_WEARINGRX
OD_AXIS: 077
OS_AXIS: 098
OD_ADD: +2.50
SPECS_TYPE: PAL
OS_ADD: +2.50
OD_CYLINDER: +1.50
OS_SPHERE: -8.00
OD_SPHERE: -5.50
OS_CYLINDER: +2.75

## 2024-06-19 ASSESSMENT — SLIT LAMP EXAM - LIDS
COMMENTS: NORMAL
COMMENTS: NORMAL

## 2024-06-19 ASSESSMENT — CUP TO DISC RATIO
OS_RATIO: 0.1
OD_RATIO: 0.1

## 2024-06-19 ASSESSMENT — TONOMETRY
IOP_METHOD: ICARE
OS_IOP_MMHG: 10
OD_IOP_MMHG: 09

## 2024-06-19 NOTE — NURSING NOTE
Chief Complaints and History of Present Illnesses   Patient presents with    Cataract Evaluation     Chief Complaint(s) and History of Present Illness(es)       Cataract Evaluation              Laterality: right eye    Associated symptoms: blurred vision and haloes.  Negative for glare and starburst    Severity: moderate    Onset: gradual    Context: near vision, reading and watching TV    Pain scale: 0/10              Comments    He has noticed a dramatic decrease in the vision of his right eye over the past year.  He changed his glasses lenses in February.      KAITLIN Melchor 8:31 AM  June 19, 2024

## 2024-06-20 ENCOUNTER — TELEPHONE (OUTPATIENT)
Dept: OPHTHALMOLOGY | Facility: CLINIC | Age: 54
End: 2024-06-20
Payer: COMMERCIAL

## 2024-06-20 NOTE — TELEPHONE ENCOUNTER
Patient called in and asked to have surgery with Dr. Powers. No orders placed in the chart. Will route to provider.    Karissa Abdi on 6/20/2024 at 12:12 PM

## 2024-06-20 NOTE — PROGRESS NOTES
HPI       Cataract Evaluation    In both eyes.  Associated symptoms include blurred vision and haloes.  Negative for glare and starburst.  Severity is moderate.  Onset was gradual.  Context:  near vision, reading and watching TV.  Pain was noted as 0/10.             Comments    He has noticed a dramatic decrease in the vision over the past year.  He changed his glasses lenses in February.  He tells me that his right eye is more effected than his left eye.  Years ago he had a retinal detachment in his left eye.    Su Randolph, COT 8:31 AM  June 19, 2024     History cryotherapy, scleral buckle Max Stra 1990s            Last edited by Luis Fernando Powers MD on 6/19/2024  9:55 AM.         Review of systems for the eyes was negative other than the pertinent positives/negatives listed in the HPI.      Assessment & Plan    HPI:  Manfred Young is a 53 year old male with history of ED, GERD presents for cataract evaluation. He has noted decreased vision right eye > left eye over the last year. Received new glasses that only worked temporarily. He works as a .     History of Retinal detachment  with cryotherapy/scleral buckle  POHx: Retinal detachment , myopia  PMHx:   Current Medications:   Current Outpatient Medications   Medication Sig Dispense Refill    ferrous fumarate-vitamin C ER (JUSTICE-SEQUELS) 65-25 MG CR tablet Take 1 tablet by mouth every other day 45 tablet 3    tadalafil (CIALIS) 10 MG tablet Take 1 tablet (10 mg) by mouth daily Take 1 tablet by mouth daily 90 tablet 3    famotidine (PEPCID) 20 MG tablet Take 1 tablet (20 mg) by mouth 2 times daily 120 tablet 0     No current facility-administered medications for this visit.     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 Cataract surgery recommended and expected to improve vision. Vision is not correctable by glasses or other nonsurgical measures. R/B/A were discussed with the patient. These included, but are not limited to: bleeding, infection, loss of vision, loss of the eye, need for more surgery, glaucoma, retinal detachment, need for glasses, corneal edema. The patient voiced understanding and wishes to proceed. All lens options were discussed with the patient including monofocal lenses, multifocal lenses, and toric lenses. The risks and benefits of each were discussed, including halos, glare, and possible need for glasses. No guarantees about vision after surgery were given. The patient voiced understanding and will consider lens options vs glasses    Proceed with CE/IOL right eye followed by left eye if patient desires. 1 week apart due to high myopia    Hold on orders until patient makes decision.     Still deciding KRISTOFER vs toric  Orders for surgery placed 06/24/24     (Z86.69) History of retinal detachment  Patient understands increased risk of Retinal detachment  right eye given no PVD    (H52.13,  H52.203,  H52.4) Myopia of both eyes with astigmatism and presbyopia  Considering updating MRx vs Cataract extraction/intraocular lens    (H35.372) Epiretinal membrane (ERM) of left eye  Mild Epiretinal membrane left eye, secondary to prior RD        No follow-ups on file.        Luis Fernando Powers MD     Attending Physician Attestation:  Complete documentation of historical and exam elements from today's encounter can be found in the full encounter summary report (not reduplicated in  this progress note).  I personally obtained the chief complaint(s) and history of present illness.  I confirmed and edited as necessary the review of systems, past medical/surgical history, family history, social history, and examination findings as documented by others; and I examined the patient myself.  I personally reviewed the relevant tests, images, and reports as documented above.  I formulated and edited as necessary the assessment and plan and discussed the findings and management plan with the patient and family. - Luis Fernando Powers MD

## 2024-06-21 NOTE — TELEPHONE ENCOUNTER
Health Call Center    Phone Message    May a detailed message be left on voicemail: yes     Reason for Call: Other: Pt called in to check the status of being scheduled for surgery with Dr Powers, pt was advised of previous TE no orders and that TE is routed to Dr Powers, Please contact pt to discuss options of being scheduled. Per pt this is for a cataract surgery. Thank you     Action Taken: Message routed to:  [facility]    Travel Screening: Not Applicable     Date of Service:

## 2024-06-27 PROBLEM — H25.813 COMBINED FORMS OF AGE-RELATED CATARACT OF BOTH EYES: Status: ACTIVE | Noted: 2024-06-19

## 2024-06-28 NOTE — TELEPHONE ENCOUNTER
Called patient to schedule surgery with Dr Powers    Spoke with: patient    Date of Surgery: 8/29 (Right) and 9/5 (Left)     Patient aware of approximate arrival time: Yes at morning     [facility] of [facility]     Pre-Op H&P: [facility] at [facility]    Informed patient that they need to call to schedule pre-op H&P with [facility] within 30 days of surgery date: Yes    Post-Op Appt Dates: 9/11 @ 8:00, 10/9 @ 7:45     Discussed with patient pre-op RN will call 2-3 days prior to surgery with arrival time and instructions:  Yes    Discussed with patient PAC RN will provide arrival time and instructions for surgery at the time of the appointment: [[place] locations only]: Not Applicable      Standard Surgery Packet Sent: Yes 06/28/24  via [employer]      Surgical Soap Discussed with Patient: No      Additional Information Sent in Packet: Post-op Appointment Itinerary      Informed patient that they will need an adult  to bring patient home from surgery: Yes  : Patient is aware of the need for an adult          Additional Comments:        All patients questions were answered and was instructed to review surgical packet and call back [phone] with any questions or concerns.       Deysi Erazo on 6/28/2024 at 8:19 AM

## 2024-07-29 ENCOUNTER — TELEPHONE (OUTPATIENT)
Dept: OPHTHALMOLOGY | Facility: CLINIC | Age: 54
End: 2024-07-29
Payer: COMMERCIAL

## 2024-07-29 NOTE — TELEPHONE ENCOUNTER
Health Call Center    Phone Message    May a detailed message be left on voicemail: yes     Reason for Call: Other: Pt is scheduled for cataract surgery on 8/29. He said Dr. Powers had mentioned something about a lens to try prior to surgery, and he would like to discuss this and how that process would work. Please call pt to discuss. Thank you.      Action Taken: Message routed to:  [facility]    Travel Screening: Not Applicable     Date of Service:

## 2024-08-21 ENCOUNTER — OFFICE VISIT (OUTPATIENT)
Dept: FAMILY MEDICINE | Facility: CLINIC | Age: 54
End: 2024-08-21
Payer: COMMERCIAL

## 2024-08-21 VITALS
SYSTOLIC BLOOD PRESSURE: 111 MMHG | HEART RATE: 56 BPM | TEMPERATURE: 97.7 F | WEIGHT: 199.9 LBS | DIASTOLIC BLOOD PRESSURE: 66 MMHG | OXYGEN SATURATION: 96 % | BODY MASS INDEX: 27.07 KG/M2 | HEIGHT: 72 IN | RESPIRATION RATE: 18 BRPM

## 2024-08-21 DIAGNOSIS — Z01.818 PREOP GENERAL PHYSICAL EXAM: Primary | ICD-10-CM

## 2024-08-21 DIAGNOSIS — H25.813 COMBINED FORMS OF AGE-RELATED CATARACT OF BOTH EYES: ICD-10-CM

## 2024-08-21 DIAGNOSIS — M25.511 ACUTE PAIN OF RIGHT SHOULDER: ICD-10-CM

## 2024-08-21 PROCEDURE — 99213 OFFICE O/P EST LOW 20 MIN: CPT | Performed by: PHYSICIAN ASSISTANT

## 2024-08-21 ASSESSMENT — PAIN SCALES - GENERAL: PAINLEVEL: MILD PAIN (2)

## 2024-08-21 NOTE — PATIENT INSTRUCTIONS

## 2024-08-26 ENCOUNTER — THERAPY VISIT (OUTPATIENT)
Dept: PHYSICAL THERAPY | Facility: CLINIC | Age: 54
End: 2024-08-26
Attending: PHYSICIAN ASSISTANT
Payer: COMMERCIAL

## 2024-08-26 DIAGNOSIS — M25.511 ACUTE PAIN OF RIGHT SHOULDER: ICD-10-CM

## 2024-08-26 PROCEDURE — 97110 THERAPEUTIC EXERCISES: CPT | Mod: GP

## 2024-08-26 PROCEDURE — 97161 PT EVAL LOW COMPLEX 20 MIN: CPT | Mod: GP

## 2024-08-26 ASSESSMENT — ACTIVITIES OF DAILY LIVING (ADL)
AT_ITS_WORST?: 4
WASHING_YOUR_HAIR?: 0
WHEN_LYING_ON_THE_INVOLVED_SIDE: 4
PUTTING_ON_YOUR_PANTS: 0
PLACING_AN_OBJECT_ON_A_HIGH_SHELF: 2
TOUCHING_THE_BACK_OF_YOUR_NECK: 2
WASHING_YOUR_BACK: 0
PLEASE_INDICATE_YOR_PRIMARY_REASON_FOR_REFERRAL_TO_THERAPY:: SHOULDER
CARRYING_A_HEAVY_OBJECT_OF_10_POUNDS: 2
PUSHING_WITH_THE_INVOLVED_ARM: 2
REACHING_FOR_SOMETHING_ON_A_HIGH_SHELF: 4
PUTTING_ON_A_SHIRT_THAT_BUTTONS_DOWN_THE_FRONT: 0
REMOVING_SOMETHING_FROM_YOUR_BACK_POCKET: 0
PUTTING_ON_AN_UNDERSHIRT_OR_A_PULLOVER_SWEATER: 2

## 2024-08-26 NOTE — PROGRESS NOTES
PHYSICAL THERAPY EVALUATION  Type of Visit: Evaluation        Fall Risk Screen:  Fall screen completed by: PT  Have you fallen 2 or more times in the past year?: No  Have you fallen and had an injury in the past year?: No  Is patient a fall risk?: No    Subjective       Presenting condition or subjective complaint: shoulder discomfort/strain  Patient presents with complaints of R shoulder pain that has been bothering him for 6-8 weeks. Last week did something that really irritated it and then took 2 ibuprofen. This has really helped it and he feels it is 90% better since. No specific upper body exercise routine outside of the heavy lifting he does at work.  He does report history of some R shoulder issues in the past couple years as he starting swimming more, but was worse in the past couple months.  Date of onset:      Relevant medical history:     Dates & types of surgery:      Prior diagnostic imaging/testing results:       Prior therapy history for the same diagnosis, illness or injury: No      Prior Level of Function  Transfers: Independent  Ambulation: Independent  ADL: Independent  IADL:  Independent    Living Environment  Social support: With a significant other or spouse   Type of home: House   Stairs to enter the home:         Ramp: No   Stairs inside the home: Yes 12 Is there a railing: Yes     Help at home: None  Equipment owned:       Employment: Yes   Hobbies/Interests: biking, skating, paddle boarding, canoeing,pickleball    Patient goals for therapy: lay on right side, pickle, paddle    Pain assessment: Pain present     Objective     SHOULDER EVALUATION  KEY FINDINGS:  Pain end range R shoulder AROM  Pain R shoulder MMT ER, flexion, elbow extension  TTP R triceps    PAIN:   Pain Level at Best: 0/10  Pain Level at Worst: 4/10  Pain Location: posterior shoulder/upper arm  Pain Quality: Aching, sore  Other symptoms: denies N/T, weakness  Pain Frequency: intermittent  Time Dependent?:  no  Pain is Exacerbated By: lying on R side, lifting above shoulder height, repetitive arm movement at work, paddle boarding  Pain is Relieved By: NSAIDs, not lying on it  Pain Progression: Improved  INTEGUMENTARY (edema, incisions):   POSTURE: Sitting Posture: Rounded shoulders  GAIT:   Weightbearing Status:   Assistive Device(s):   Gait Deviations:   BALANCE/PROPRIOCEPTION:   WEIGHTBEARING ALIGNMENT:   ROM:   (Degrees) Left AROM Left PROM Right AROM  Right PROM   Shoulder   Flexion 180  180*    Shoulder Extension full  full    Shoulder Abduction 180  180*    Shoulder Adduction       Shoulder IR with extension T8  T8*    Shoulder ER in neutral 80  70*    Shoulder Horizontal Abduction       Shoulder Horizontal Adduction       Shoulder ER at 90 deg ABD       Shoulder IR at 90 deg ABD       Elbow Extension       Elbow Flexion       Pain: at end ranges *  End feel:     STRENGTH:   Pain: - none + mild ++ moderate +++ severe  Strength Scale: 0-5/5 Left Right   Shoulder Flexion 5 5*   Shoulder Extension     Shoulder Abduction 5 5   Shoulder Adduction     Shoulder Internal Rotation 5 5   Shoulder External Rotation 5 5   Shoulder Horizontal Abduction     Shoulder Horizontal Adduction     Elbow Flexion 5 5   Elbow Extension 5 5*   Mid Trap     Lower Trap     Rhomboid     Serratus Anterior       FLEXIBILITY: WNL  SPECIAL TESTS:   HK and Crossbody adduction positive for pain posterior shoulder  Neers Negative  Empty Can Negative  PALPATION:   TTP proximal half of triceps, otherwise WNL  JOINT MOBILITY:   CERVICAL SCREEN: WNL      Assessment & Plan   CLINICAL IMPRESSIONS  Medical Diagnosis: Acute pain of right shoulder    Treatment Diagnosis: R shoulder pain   Impression/Assessment: Patient is a 53 year old male with R shoulder complaints.  The following significant findings have been identified: Pain, Decreased ROM/flexibility, Decreased strength, Decreased activity tolerance, and Impaired posture. These impairments interfere  with their ability to perform self care tasks, work tasks, recreational activities, and household chores as compared to previous level of function.     Clinical Decision Making (Complexity):  Clinical Presentation: Stable/Uncomplicated  Clinical Presentation Rationale: based on medical and personal factors listed in PT evaluation  Clinical Decision Making (Complexity): Low complexity    PLAN OF CARE  Treatment Interventions:  Modalities: Dry Needling  Interventions: Manual Therapy, Neuromuscular Re-education, Therapeutic Activity, Therapeutic Exercise, Self-Care/Home Management    Long Term Goals     PT Goal 1  Goal Identifier: Lifting  Goal Description: Patient will be able to lift 10# overhead w/o increased pain  Rationale: to maximize safety and independence with performance of ADLs and functional tasks;to maximize safety and independence within the home;to maximize safety and independence within the community;to maximize safety and independence with self cares  Target Date: 10/21/24      Frequency of Treatment: 1x/week  Duration of Treatment: 8 weeks    Recommended Referrals to Other Professionals:  none at this time  Education Assessment:   Learner/Method: Patient;Demonstration;Pictures/Video;No Barriers to Learning    Risks and benefits of evaluation/treatment have been explained.   Patient/Family/caregiver agrees with Plan of Care.     Evaluation Time:          Signing Clinician: Benita Hamilton, PT

## 2024-08-28 ENCOUNTER — ANESTHESIA EVENT (OUTPATIENT)
Dept: SURGERY | Facility: AMBULATORY SURGERY CENTER | Age: 54
End: 2024-08-28
Payer: COMMERCIAL

## 2024-08-29 ENCOUNTER — HOSPITAL ENCOUNTER (OUTPATIENT)
Facility: AMBULATORY SURGERY CENTER | Age: 54
Discharge: HOME | End: 2024-08-29
Attending: OPHTHALMOLOGY
Payer: COMMERCIAL

## 2024-08-29 ENCOUNTER — ANESTHESIA (OUTPATIENT)
Dept: SURGERY | Facility: AMBULATORY SURGERY CENTER | Age: 54
End: 2024-08-29
Payer: COMMERCIAL

## 2024-08-29 ENCOUNTER — OFFICE VISIT (OUTPATIENT)
Dept: OPHTHALMOLOGY | Facility: CLINIC | Age: 54
End: 2024-08-29

## 2024-08-29 VITALS
DIASTOLIC BLOOD PRESSURE: 76 MMHG | HEART RATE: 52 BPM | TEMPERATURE: 97 F | BODY MASS INDEX: 26.41 KG/M2 | WEIGHT: 195 LBS | HEIGHT: 72 IN | RESPIRATION RATE: 14 BRPM | SYSTOLIC BLOOD PRESSURE: 101 MMHG | OXYGEN SATURATION: 96 %

## 2024-08-29 DIAGNOSIS — H25.811 COMBINED FORM OF AGE-RELATED CATARACT, RIGHT EYE: Primary | ICD-10-CM

## 2024-08-29 DIAGNOSIS — Z96.1 PSEUDOPHAKIA, RIGHT EYE: Primary | ICD-10-CM

## 2024-08-29 PROCEDURE — V2599 CONTACT LENS/ES OTHER TYPE: HCPCS | Mod: DBP,LT

## 2024-08-29 PROCEDURE — 66984 XCAPSL CTRC RMVL W/O ECP: CPT | Performed by: ANESTHESIOLOGY

## 2024-08-29 PROCEDURE — 66984 XCAPSL CTRC RMVL W/O ECP: CPT | Performed by: NURSE ANESTHETIST, CERTIFIED REGISTERED

## 2024-08-29 PROCEDURE — 99024 POSTOP FOLLOW-UP VISIT: CPT | Performed by: OPHTHALMOLOGY

## 2024-08-29 PROCEDURE — 66984 XCAPSL CTRC RMVL W/O ECP: CPT | Mod: RT

## 2024-08-29 PROCEDURE — 96999 UNLISTED SPEC DERM SVC/PX: CPT | Mod: RT | Performed by: OPHTHALMOLOGY

## 2024-08-29 PROCEDURE — 66984 XCAPSL CTRC RMVL W/O ECP: CPT | Mod: RT | Performed by: OPHTHALMOLOGY

## 2024-08-29 DEVICE — IMPLANTABLE DEVICE: Type: IMPLANTABLE DEVICE | Site: EYE | Status: FUNCTIONAL

## 2024-08-29 RX ORDER — SODIUM CHLORIDE, SODIUM LACTATE, POTASSIUM CHLORIDE, CALCIUM CHLORIDE 600; 310; 30; 20 MG/100ML; MG/100ML; MG/100ML; MG/100ML
INJECTION, SOLUTION INTRAVENOUS CONTINUOUS
Status: DISCONTINUED | OUTPATIENT
Start: 2024-08-29 | End: 2024-08-29 | Stop reason: HOSPADM

## 2024-08-29 RX ORDER — ONDANSETRON 2 MG/ML
4 INJECTION INTRAMUSCULAR; INTRAVENOUS EVERY 30 MIN PRN
Status: DISCONTINUED | OUTPATIENT
Start: 2024-08-29 | End: 2024-08-29 | Stop reason: HOSPADM

## 2024-08-29 RX ORDER — SODIUM CHLORIDE, SODIUM LACTATE, POTASSIUM CHLORIDE, CALCIUM CHLORIDE 600; 310; 30; 20 MG/100ML; MG/100ML; MG/100ML; MG/100ML
INJECTION, SOLUTION INTRAVENOUS CONTINUOUS
Status: DISCONTINUED | OUTPATIENT
Start: 2024-08-29 | End: 2024-08-30 | Stop reason: HOSPADM

## 2024-08-29 RX ORDER — MOXIFLOXACIN IN NACL,ISO-OS/PF 0.3MG/0.3
SYRINGE (ML) INTRAOCULAR PRN
Status: DISCONTINUED | OUTPATIENT
Start: 2024-08-29 | End: 2024-08-29 | Stop reason: HOSPADM

## 2024-08-29 RX ORDER — DEXAMETHASONE SODIUM PHOSPHATE 10 MG/ML
4 INJECTION, SOLUTION INTRAMUSCULAR; INTRAVENOUS
Status: DISCONTINUED | OUTPATIENT
Start: 2024-08-29 | End: 2024-08-29 | Stop reason: HOSPADM

## 2024-08-29 RX ORDER — LIDOCAINE HYDROCHLORIDE 10 MG/ML
INJECTION, SOLUTION EPIDURAL; INFILTRATION; INTRACAUDAL; PERINEURAL PRN
Status: DISCONTINUED | OUTPATIENT
Start: 2024-08-29 | End: 2024-08-29 | Stop reason: HOSPADM

## 2024-08-29 RX ORDER — FENTANYL CITRATE 50 UG/ML
INJECTION, SOLUTION INTRAMUSCULAR; INTRAVENOUS PRN
Status: DISCONTINUED | OUTPATIENT
Start: 2024-08-29 | End: 2024-08-29

## 2024-08-29 RX ORDER — ONDANSETRON 2 MG/ML
4 INJECTION INTRAMUSCULAR; INTRAVENOUS EVERY 30 MIN PRN
Status: DISCONTINUED | OUTPATIENT
Start: 2024-08-29 | End: 2024-08-30 | Stop reason: HOSPADM

## 2024-08-29 RX ORDER — MOXIFLOXACIN 5 MG/ML
1 SOLUTION/ DROPS OPHTHALMIC
Status: COMPLETED | OUTPATIENT
Start: 2024-08-29 | End: 2024-08-29

## 2024-08-29 RX ORDER — HYDROMORPHONE HYDROCHLORIDE 1 MG/ML
0.4 INJECTION, SOLUTION INTRAMUSCULAR; INTRAVENOUS; SUBCUTANEOUS EVERY 5 MIN PRN
Status: DISCONTINUED | OUTPATIENT
Start: 2024-08-29 | End: 2024-08-29 | Stop reason: HOSPADM

## 2024-08-29 RX ORDER — KETOROLAC TROMETHAMINE 5 MG/ML
1 SOLUTION OPHTHALMIC 4 TIMES DAILY
Qty: 10 ML | Refills: 0 | Status: SHIPPED | OUTPATIENT
Start: 2024-08-29

## 2024-08-29 RX ORDER — HYDROMORPHONE HYDROCHLORIDE 1 MG/ML
0.2 INJECTION, SOLUTION INTRAMUSCULAR; INTRAVENOUS; SUBCUTANEOUS EVERY 5 MIN PRN
Status: DISCONTINUED | OUTPATIENT
Start: 2024-08-29 | End: 2024-08-29 | Stop reason: HOSPADM

## 2024-08-29 RX ORDER — DEXAMETHASONE SODIUM PHOSPHATE 10 MG/ML
4 INJECTION, SOLUTION INTRAMUSCULAR; INTRAVENOUS
Status: DISCONTINUED | OUTPATIENT
Start: 2024-08-29 | End: 2024-08-30 | Stop reason: HOSPADM

## 2024-08-29 RX ORDER — PROPARACAINE HYDROCHLORIDE 5 MG/ML
1 SOLUTION/ DROPS OPHTHALMIC ONCE
Status: COMPLETED | OUTPATIENT
Start: 2024-08-29 | End: 2024-08-29

## 2024-08-29 RX ORDER — ONDANSETRON 4 MG/1
4 TABLET, ORALLY DISINTEGRATING ORAL EVERY 30 MIN PRN
Status: DISCONTINUED | OUTPATIENT
Start: 2024-08-29 | End: 2024-08-29 | Stop reason: HOSPADM

## 2024-08-29 RX ORDER — FENTANYL CITRATE 50 UG/ML
25 INJECTION, SOLUTION INTRAMUSCULAR; INTRAVENOUS EVERY 5 MIN PRN
Status: DISCONTINUED | OUTPATIENT
Start: 2024-08-29 | End: 2024-08-29 | Stop reason: HOSPADM

## 2024-08-29 RX ORDER — PREDNISOLONE ACETATE 10 MG/ML
1-2 SUSPENSION/ DROPS OPHTHALMIC 4 TIMES DAILY
Qty: 10 ML | Refills: 0 | Status: SHIPPED | OUTPATIENT
Start: 2024-08-29

## 2024-08-29 RX ORDER — TETRACAINE HYDROCHLORIDE 5 MG/ML
SOLUTION OPHTHALMIC PRN
Status: DISCONTINUED | OUTPATIENT
Start: 2024-08-29 | End: 2024-08-29 | Stop reason: HOSPADM

## 2024-08-29 RX ORDER — MOXIFLOXACIN 5 MG/ML
1 SOLUTION/ DROPS OPHTHALMIC 4 TIMES DAILY
Qty: 3 ML | Refills: 0 | Status: SHIPPED | OUTPATIENT
Start: 2024-08-29

## 2024-08-29 RX ORDER — ACETAMINOPHEN 325 MG/1
975 TABLET ORAL ONCE
Status: COMPLETED | OUTPATIENT
Start: 2024-08-29 | End: 2024-08-29

## 2024-08-29 RX ORDER — ONDANSETRON 4 MG/1
4 TABLET, ORALLY DISINTEGRATING ORAL EVERY 30 MIN PRN
Status: DISCONTINUED | OUTPATIENT
Start: 2024-08-29 | End: 2024-08-30 | Stop reason: HOSPADM

## 2024-08-29 RX ORDER — DICLOFENAC SODIUM 1 MG/ML
1 SOLUTION/ DROPS OPHTHALMIC
Status: COMPLETED | OUTPATIENT
Start: 2024-08-29 | End: 2024-08-29

## 2024-08-29 RX ORDER — BALANCED SALT SOLUTION 6.4; .75; .48; .3; 3.9; 1.7 MG/ML; MG/ML; MG/ML; MG/ML; MG/ML; MG/ML
SOLUTION OPHTHALMIC PRN
Status: DISCONTINUED | OUTPATIENT
Start: 2024-08-29 | End: 2024-08-29 | Stop reason: HOSPADM

## 2024-08-29 RX ORDER — CYCLOPENTOLAT/TROPIC/PHENYLEPH 1%-1%-2.5%
1 DROPS (EA) OPHTHALMIC (EYE)
Status: COMPLETED | OUTPATIENT
Start: 2024-08-29 | End: 2024-08-29

## 2024-08-29 RX ORDER — OXYCODONE HYDROCHLORIDE 5 MG/1
10 TABLET ORAL
Status: DISCONTINUED | OUTPATIENT
Start: 2024-08-29 | End: 2024-08-30 | Stop reason: HOSPADM

## 2024-08-29 RX ORDER — FENTANYL CITRATE 50 UG/ML
50 INJECTION, SOLUTION INTRAMUSCULAR; INTRAVENOUS EVERY 5 MIN PRN
Status: DISCONTINUED | OUTPATIENT
Start: 2024-08-29 | End: 2024-08-29 | Stop reason: HOSPADM

## 2024-08-29 RX ORDER — NALOXONE HYDROCHLORIDE 0.4 MG/ML
0.1 INJECTION, SOLUTION INTRAMUSCULAR; INTRAVENOUS; SUBCUTANEOUS
Status: DISCONTINUED | OUTPATIENT
Start: 2024-08-29 | End: 2024-08-29 | Stop reason: HOSPADM

## 2024-08-29 RX ORDER — OXYCODONE HYDROCHLORIDE 5 MG/1
5 TABLET ORAL
Status: DISCONTINUED | OUTPATIENT
Start: 2024-08-29 | End: 2024-08-30 | Stop reason: HOSPADM

## 2024-08-29 RX ORDER — LABETALOL HYDROCHLORIDE 5 MG/ML
10 INJECTION, SOLUTION INTRAVENOUS
Status: DISCONTINUED | OUTPATIENT
Start: 2024-08-29 | End: 2024-08-29 | Stop reason: HOSPADM

## 2024-08-29 RX ORDER — LIDOCAINE 40 MG/G
CREAM TOPICAL
Status: DISCONTINUED | OUTPATIENT
Start: 2024-08-29 | End: 2024-08-29 | Stop reason: HOSPADM

## 2024-08-29 RX ORDER — NALOXONE HYDROCHLORIDE 0.4 MG/ML
0.1 INJECTION, SOLUTION INTRAMUSCULAR; INTRAVENOUS; SUBCUTANEOUS
Status: DISCONTINUED | OUTPATIENT
Start: 2024-08-29 | End: 2024-08-30 | Stop reason: HOSPADM

## 2024-08-29 RX ADMIN — PROPARACAINE HYDROCHLORIDE 1 DROP: 5 SOLUTION/ DROPS OPHTHALMIC at 07:09

## 2024-08-29 RX ADMIN — Medication 1 DROP: at 07:11

## 2024-08-29 RX ADMIN — DICLOFENAC SODIUM 1 DROP: 1 SOLUTION/ DROPS OPHTHALMIC at 07:16

## 2024-08-29 RX ADMIN — DICLOFENAC SODIUM 1 DROP: 1 SOLUTION/ DROPS OPHTHALMIC at 07:10

## 2024-08-29 RX ADMIN — FENTANYL CITRATE 50 MCG: 50 INJECTION, SOLUTION INTRAMUSCULAR; INTRAVENOUS at 07:59

## 2024-08-29 RX ADMIN — DICLOFENAC SODIUM 1 DROP: 1 SOLUTION/ DROPS OPHTHALMIC at 07:21

## 2024-08-29 RX ADMIN — ACETAMINOPHEN 975 MG: 325 TABLET ORAL at 07:18

## 2024-08-29 RX ADMIN — SODIUM CHLORIDE, SODIUM LACTATE, POTASSIUM CHLORIDE, CALCIUM CHLORIDE: 600; 310; 30; 20 INJECTION, SOLUTION INTRAVENOUS at 07:20

## 2024-08-29 RX ADMIN — MOXIFLOXACIN 1 DROP: 5 SOLUTION/ DROPS OPHTHALMIC at 07:20

## 2024-08-29 RX ADMIN — MOXIFLOXACIN 1 DROP: 5 SOLUTION/ DROPS OPHTHALMIC at 07:10

## 2024-08-29 RX ADMIN — Medication 1 DROP: at 07:21

## 2024-08-29 RX ADMIN — FENTANYL CITRATE 50 MCG: 50 INJECTION, SOLUTION INTRAMUSCULAR; INTRAVENOUS at 08:02

## 2024-08-29 RX ADMIN — Medication 1 DROP: at 07:17

## 2024-08-29 RX ADMIN — MOXIFLOXACIN 1 DROP: 5 SOLUTION/ DROPS OPHTHALMIC at 07:16

## 2024-08-29 ASSESSMENT — VISUAL ACUITY
METHOD: SNELLEN - LINEAR
OD_SC: 20/25+

## 2024-08-29 ASSESSMENT — TONOMETRY
IOP_METHOD: TONOPEN
OD_IOP_MMHG: 5

## 2024-08-29 ASSESSMENT — SLIT LAMP EXAM - LIDS: COMMENTS: NORMAL

## 2024-08-29 NOTE — PROGRESS NOTES
Review of systems for the eyes was negative other than the pertinent positives/negatives listed in the HPI.      Assessment & Plan    HPI:  Manfred Young is a 53 year old male with history of ED, GERD presents for  Postoperative day 0 right eye     History of Retinal detachment  with cryotherapy/scleral buckle  POHx: Retinal detachment , myopia  PMHx:   Current Medications:   Current Outpatient Medications   Medication Sig Dispense Refill    ferrous fumarate-vitamin C ER (JUSTICE-SEQUELS) 65-25 MG CR tablet Take 1 tablet by mouth every other day 45 tablet 3    ketorolac (ACULAR) 0.5 % ophthalmic solution Place 1 drop into the right eye 4 times daily. Start 2 days prior to surgery four times a day, after surgery: Four times a day x 1 week, three times a day x 1 week, twice a day x 1 week, daily x 1 week then stop 10 mL 0    moxifloxacin (VIGAMOX) 0.5 % ophthalmic solution Place 1 drop into the right eye 4 times daily. Start 2 days prior to surgery four times a day, after surgery: Four times a day x 1 week 3 mL 0    prednisoLONE acetate (PRED FORTE) 1 % ophthalmic suspension Place 1-2 drops into the right eye 4 times daily. after surgery: Four times a day x 1 week, three times a day x 1 week, twice a day x 1 week, daily x 1 week then stop 10 mL 0    tadalafil (CIALIS) 10 MG tablet Take 1 tablet (10 mg) by mouth daily Take 1 tablet by mouth daily 90 tablet 3     No current facility-administered medications for this visit.     Facility-Administered Medications Ordered in Other Visits   Medication Dose Route Frequency Provider Last Rate Last Admin    dexAMETHasone PF (DECADRON) injection 4 mg  4 mg Intravenous Once PRN Oscar Pineda MD        lactated ringers infusion   Intravenous Continuous Luis Fernando Powers MD        naloxone (NARCAN) injection 0.1 mg  0.1 mg Intravenous Q2 Min PRN Oscar Pineda MD        ondansetron (ZOFRAN ODT) ODT tab 4 mg  4 mg Oral Q30 Min PRN  Oscar Pineda MD        Or    ondansetron (ZOFRAN) injection 4 mg  4 mg Intravenous Q30 Min PRN Oscar Pineda MD        oxyCODONE (ROXICODONE) tablet 10 mg  10 mg Oral Once PRN Oscar Pineda MD        oxyCODONE (ROXICODONE) tablet 5 mg  5 mg Oral Once PRN Oscar Pineda MD        prochlorperazine (COMPAZINE) injection 5 mg  5 mg Intravenous Q6H PRN Oscar Pineda MD         FHx:   PSHx: SB/cryotherapy 1990s, Cataract extraction/intraocular lens Powers right eye 08/29/24       Current Eye Medications:      Assessment & Plan:  Pseudophakia, right eye, day 0  Powers right eye 08/29/24   Doing well  Keep patch in place at night for 7 days  Start post-operative drops and taper according to instructions  Post-operative do's and don'ts reviewed, questions answered    Recheck 1 week    (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  (primary encounter diagnosis)  (H26.9) Cataract  Special equipment/needs:  Eye: left   Anesthesia:topical  Dilates to: 6.75  Iris expansion:  No  Pseudoexfoliation: No  Trypan Blue: No  Trauma: No    Able lay to flat: Yes  Blood Thinner: No   Tamsulosin: No  DM: No  Guttae: No    Dominant Eye: right    Plan: [place] toric Eyehance both eyes     Proceed with CE/IOL left eye     (Z86.69) History of retinal detachment  Patient understands increased risk of Retinal detachment  right eye given no PVD    (H52.13,  H52.203,  H52.4) Myopia of both eyes with astigmatism and presbyopia  Considering updating MRx vs Cataract extraction/intraocular lens    (H35.372) Epiretinal membrane (ERM) of left eye  Mild Epiretinal membrane left eye, secondary to prior RD        Return for as scheduled.        Luis Fernando Powers MD     Attending Physician Attestation:  Complete documentation of historical and exam elements from today's encounter can be found in the full encounter summary report (not reduplicated  in this progress note).  I personally obtained the chief complaint(s) and history of present illness.  I confirmed and edited as necessary the review of systems, past medical/surgical history, family history, social history, and examination findings as documented by others; and I examined the patient myself.  I personally reviewed the relevant tests, images, and reports as documented above.  I formulated and edited as necessary the assessment and plan and discussed the findings and management plan with the patient and family. - Luis Fernando Powers MD

## 2024-08-29 NOTE — ANESTHESIA POSTPROCEDURE EVALUATION
Patient: Manfred Young    Procedure: Procedure(s):  RIGHT EYE PHACOEMULSIFICATION, CATARACT, WITH INTRAOCULAR LENS IMPLANT, TORIC LENS       Anesthesia Type:  MAC    Note:  Disposition: Outpatient   Postop Pain Control: Uneventful            Sign Out: Well controlled pain   PONV: No   Neuro/Psych: Uneventful            Sign Out: Acceptable/Baseline neuro status   Airway/Respiratory: Uneventful            Sign Out: Acceptable/Baseline resp. status   CV/Hemodynamics: Uneventful            Sign Out: Acceptable CV status   Other NRE: NONE   DID A NON-ROUTINE EVENT OCCUR? No           Last vitals:  Vitals Value Taken Time   /76 08/29/24 0850   Temp 36.1  C (97  F) 08/29/24 0835   Pulse     Resp 14 08/29/24 0850   SpO2 96 % 08/29/24 0850       Electronically Signed By: Manfred Presley MD  August 29, 2024  9:23 AM

## 2024-08-29 NOTE — OP NOTE
PREOPERATIVE DIAGNOSIS: Visually significant cataract, Right eye   POSTOPERATIVE DIAGNOSIS: Same   PROCEDURES:   1. Cataract extraction with intraocular lens implant Right eye.  SURGEON: Luis Fernando Powers M.D.  INDICATIONS: The patient Manfred Young presented to the eye clinic with decreased vision secondary to cataract in the Right eye. The risks, benefits and alternatives to cataract extraction were discussed. The patient elected to proceed. All questions were answered to the patient's satisfaction.   DESCRIPTION OF PROCEDURE:   Prior to the procedure, appropriate cardiac and respiratory monitors were applied to the patient.  In the pre-operative holding area, a drop of topical tetracaine was placed.  The patient was brought to the operating room where a surgical pause was carried out to identify with all members of the surgical team the correct surgical site.  With adequate anesthesia, the Right eye was prepped and draped in the usual sterile fashion. A lid speculum was placed, and the operating microscope was rotated into position. The Digital Marking Microscope was engaged and aligned with conjunctival vessels. A paracentesis was created.  Through this limbal paracentesis, the anterior chamber was filled with preservative-free lidocaine followed by viscoelastic.  A temporal wound was created at the limbus using a 2.6 mm blade. A capsulorrhexis was initiated using a bent 25-gauge needle and was completed in continuous and circular fashion using the capsulorrhexis forceps. The lens nucleus was hydrodissected using balanced salt solution.  The lens nucleus was rotated and removed using phacoemulsification in a stop and chop technique.  Residual cortical material was removed using irrigation-aspiration.  The capsular bag was reinflated to its maximal extent with cohesive viscoelastic.  A 15.5 diopter [id number] was inserted into the capsular bag and aligned based on the Digital Marking system.  The lens power  selected was reviewed using the intraocular lens power measurements that were obtained preoperatively to confirm that the correct lens was selected for the desired post-operative refractive state. The residual viscoelastic was removed in its entirety, the wounds were hydrated and found to be self-sealing.  Intracameral moxifloxacin was administered. Tactile pressure was confirmed to be in a normal range.  The lid speculum was removed and a patch and shield were applied.   The patient tolerated the procedure well, and there were no complications.    PLAN: The patient will be discharged to home and will follow up today  EBL:  None  Complications:  None  Implant Name Type Inv. Item Serial No.  Lot No. LRB No. Used Action   LENS IOL TECNIS TORIC  [id number] 15.5 [id number] - [id number] Lens/Eye Implant LENS IOL TECNIS TORIC  [id number] 15.5 [id number] [employer]  Right 1 Implanted

## 2024-08-29 NOTE — DISCHARGE INSTRUCTIONS
[facility]  Home Care Following Anesthesia  For 24 hours after surgery:  Get plenty of rest.  A responsible adult must stay with you for at least 24 hours after you leave the surgery center.  Do not drive or use heavy equipment.  If you have weakness or tingling, don't drive or use heavy equipment until this feeling goes away.   Do not drink alcohol.   Avoid strenuous or risky activities.  Ask for help when climbing stairs.  You may feel lightheaded.  IF so, sit for a few minutes before standing.  Have someone help you get up.   If you have nausea (feel sick to your stomach): Drink only clear liquids such as apple juice, ginger ale, broth or 7-Up.  Rest may also help.  Be sure to drink enough fluids.  Move to a regular diet as you feel able.   You may have a slight fever.  Call the doctor if your fever is over 100 F (37.7 C) (taken under the tongue) or lasts longer than 24 hours.  You may have a dry mouth, a sore throat, muscle aches or trouble sleeping. These should go away after 24 hours.  Do not make important or legal decisions.   It is recommended to avoid smoking.               Tips for taking pain medications  To get the best pain relief possible, remember these points:  Take pain medications as directed, before pain becomes severe.  Pain medication can upset your stomach: taking it with food may help.  Constipation is a common side effect of pain medication. Drink plenty of  fluids.  Eat foods high in fiber. Take a stool softener if recommended by your doctor or pharmacist.  Do not drink alcohol, drive or operate machinery while taking pain medications.  Ask about other ways to control pain, such as with heat, ice or relaxation.    Tylenol/Acetaminophen Consumption    If you feel your pain relief is insufficient, you may take Tylenol/Acetaminophen in addition to your narcotic pain medication.   Be careful not to exceed 4,000 mg of Tylenol/Acetaminophen in a 24 hour  period from all sources.  If you are taking extra strength Tylenol/acetaminophen (500 mg), the maximum dose is 8 tablets in 24 hours.  If you are taking regular strength acetaminophen (325 mg), the maximum dose is 12 tablets in 24 hours.  Tylenol 975 mg given at 718am.   Ok to take more after 118pm.     Call a doctor for any of the following:  Signs of infection (fever, growing tenderness at the surgery site, a large amount of drainage or bleeding, severe pain, foul-smelling drainage, redness, swelling).  It has been over 8 to 10 hours since surgery and you are still not able to urinate (pass water).  Headache for over 24 hours.  Signs of Covid-19 infection (temperature over 100 degrees, shortness of breath, cough, loss of taste/smell, generalized body aches, persistent headache, chills, sore throat, nausea/vomiting/diarrhea)  Your doctor is:  Dr. Luis Fernando Powers, Ophthalmology: [phone]                    Or dial [phone] and ask for the resident on call for:  Ophthalmology  For emergency care, call the:  [place] [facility]:  [phone] (TTY for hearing impaired: [phone])

## 2024-08-29 NOTE — ANESTHESIA PREPROCEDURE EVALUATION
Anesthesia Pre-Procedure Evaluation    Patient: Manfred Young   MRN: [id number] : 1970        Procedure : Procedure(s):  RIGHT EYE PHACOEMULSIFICATION, CATARACT, WITH INTRAOCULAR LENS IMPLANT, TORIC LENS          Past Medical History:   Diagnosis Date    NO ACTIVE PROBLEMS     Symptomatic cholelithiasis       Past Surgical History:   Procedure Laterality Date    LAPAROSCOPIC CHOLECYSTECTOMY N/A 2023    Procedure: Laparoscopic cholecystectomy;  Surgeon: Cody Whitmore MD;  Location: [facility] OR    RETINAL REATTACHMENT      SURGICAL HISTORY OF -       detached retina    SURGICAL HISTORY OF -       arthroscopic knee surgery-Right x1, left x1, plica repair      Allergies   Allergen Reactions    Bee Venom Swelling     Gets hot    Chlorhexidine Gluconate Itching     Has a sensitivity    Penicillins Diarrhea    Wasp Venom Protein Other (See Comments)     High fever after being stung by bee.      Social History     Tobacco Use    Smoking status: Never    Smokeless tobacco: Never   Substance Use Topics    Alcohol use: Yes      Wt Readings from Last 1 Encounters:   24 88.5 kg (195 lb)        Anesthesia Evaluation            ROS/MED HX  ENT/Pulmonary:       Neurologic:       Cardiovascular:       METS/Exercise Tolerance:     Hematologic:     (+)      anemia,          Musculoskeletal:       GI/Hepatic:       Renal/Genitourinary:     (+)       Nephrolithiasis ,       Endo:       Psychiatric/Substance Use:       Infectious Disease:       Malignancy:       Other:            Physical Exam    Airway  airway exam normal      Mallampati: II   TM distance: > 3 FB   Neck ROM: full   Mouth opening: > 3 cm    Respiratory Devices and Support         Dental       (+) Completely normal teeth      Cardiovascular          Rhythm and rate: regular and normal     Pulmonary   pulmonary exam normal        breath sounds clear to auscultation           OUTSIDE LABS:  CBC:   Lab Results   Component Value Date    WBC 5.8  "01/09/2024    WBC 4.8 08/07/2023    HGB 11.9 (L) 01/09/2024    HGB 12.3 (L) 08/07/2023    HCT 40.4 01/09/2024    HCT 39.5 (L) 08/07/2023     01/09/2024     08/07/2023     BMP:   Lab Results   Component Value Date     01/09/2024     08/07/2023    POTASSIUM 3.9 01/09/2024    POTASSIUM 3.9 08/07/2023    CHLORIDE 105 01/09/2024    CHLORIDE 106 08/07/2023    CO2 29 01/09/2024    CO2 24 08/07/2023    BUN 13.5 01/09/2024    BUN 11.0 08/07/2023    CR 0.91 01/09/2024    CR 0.89 08/07/2023    GLC 94 01/09/2024     (H) 08/07/2023     COAGS:   Lab Results   Component Value Date    PTT 27 04/12/2021    INR 1.04 04/12/2021     POC: No results found for: \"BGM\", \"HCG\", \"HCGS\"  HEPATIC:   Lab Results   Component Value Date    ALBUMIN 4.3 08/07/2023    PROTTOTAL 6.4 08/07/2023    ALT 19 08/07/2023    AST 17 08/07/2023    ALKPHOS 56 08/07/2023    BILITOTAL 0.3 08/07/2023     OTHER:   Lab Results   Component Value Date    A1C 5.2 04/12/2021    ROSENDO 9.2 01/09/2024    PHOS 3.0 04/12/2021    LIPASE 48 08/07/2023       Anesthesia Plan    ASA Status:  1    NPO Status:  NPO Appropriate    Anesthesia Type: MAC.     - Reason for MAC: immobility needed, straight local not clinically adequate   Induction: Intravenous.           Consents    Anesthesia Plan(s) and associated risks, benefits, and realistic alternatives discussed. Questions answered and patient/representative(s) expressed understanding.     - Discussed:     - Discussed with:  Patient      - Extended Intubation/Ventilatory Support Discussed: No.      - Patient is DNR/DNI Status: No     Use of blood products discussed: No .     Postoperative Care    Pain management: IV analgesics, Oral pain medications, Multi-modal analgesia.   PONV prophylaxis: Ondansetron (or other 5HT-3)     Comments:               Manfred Presley MD    I have reviewed the pertinent notes and labs in the chart from the past 30 days and (re)examined the patient.  Any updates or " "changes from those notes are reflected in this note.              # Overweight: Estimated body mass index is 26.44 kg/m  as calculated from the following:    Height as of this encounter: 1.829 m (6' 0.01\").    Weight as of this encounter: 88.5 kg (195 lb).      "

## 2024-08-29 NOTE — ANESTHESIA CARE TRANSFER NOTE
Patient: Manfred Young    Procedure: Procedure(s):  RIGHT EYE PHACOEMULSIFICATION, CATARACT, WITH INTRAOCULAR LENS IMPLANT, TORIC LENS       Diagnosis: Combined forms of age-related cataract of both eyes [H25.813]  Diagnosis Additional Information: No value filed.    Anesthesia Type:   MAC     Note:    Oropharynx: oropharynx clear of all foreign objects and spontaneously breathing  Level of Consciousness: awake  Oxygen Supplementation: room air    Independent Airway: airway patency satisfactory and stable  Dentition: dentition unchanged  Vital Signs Stable: post-procedure vital signs reviewed and stable  Report to RN Given: handoff report given  Patient transferred to: Phase II    Handoff Report: Identifed the Patient, Identified the Reponsible Provider, Reviewed the pertinent medical history, Discussed the surgical course, Reviewed Intra-OP anesthesia mangement and issues during anesthesia, Set expectations for post-procedure period and Allowed opportunity for questions and acknowledgement of understanding      Vitals:  Vitals Value Taken Time   BP     Temp 36.1  C (97  F) 08/29/24 0835   Pulse     Resp 14 08/29/24 0835   SpO2 96 % 08/29/24 0835       Electronically Signed By: GILDARDO Naranjo CRNA  August 29, 2024  8:36 AM

## 2024-08-30 ENCOUNTER — TELEPHONE (OUTPATIENT)
Dept: OPHTHALMOLOGY | Facility: CLINIC | Age: 54
End: 2024-08-30
Payer: COMMERCIAL

## 2024-08-30 NOTE — TELEPHONE ENCOUNTER
[facility] Call Center    Phone Message    May a detailed message be left on voicemail: yes     Reason for Call: Other: pt is requesting a call back pt advised he had cataract surgery on 8/29/24 and pt is trying to use glasses to correct his left eye until surgery next week and pt advises his brain does not like this. Pt is requesting a call back to discuss options. Thank you      Action Taken: Message routed to:  [facility] eye    Travel Screening: Not Applicable     Date of Service:

## 2024-08-30 NOTE — TELEPHONE ENCOUNTER
Pt minus 8 in left eye glasses lens and has good vision right eye without correction now and has lens out of right eye following cataract surgery    Pt very disorientated     Reviewed ok to place lens back in glasses and use tape over lens and use left eye with bifocals for distance/reading    Pt understands and more concerned about the left eye cataract surgery coming up that has refractive goal of computer/intermittent distance.    Reviewed patient able to confirm refractive goal day of surgery/prior to surgery with Dr. Powers and able to change intraocular lens day of surgery if would be indication.      Reviewed refraction goal would likely be under 2 diopters and would not anticipate the the problems having with 8 diopter difference.    I also review with pt will forward to Dr. Powers for review prior to surgery date.    Manuel Calvert RN 3:56 PM 08/30/24

## 2024-08-31 NOTE — PROGRESS NOTES
Review of systems for the eyes was negative other than the pertinent positives/negatives listed in the HPI.      Assessment & Plan    HPI:  Manfred Young is a 53 year old male with history of ED, GERD presents for  Postoperative day 0 left  eye     History of Retinal detachment  with cryotherapy/scleral buckle  POHx: Retinal detachment , myopia  PMHx:   Current Medications:   Current Outpatient Medications   Medication Sig Dispense Refill    ferrous fumarate-vitamin C ER (JUSTICE-SEQUELS) 65-25 MG CR tablet Take 1 tablet by mouth every other day 45 tablet 3    ketorolac (ACULAR) 0.5 % ophthalmic solution Place 1 drop Into the left eye 4 times daily. Start 2 days prior to surgery four times a day, after surgery: Four times a day x 1 week, three times a day x 1 week, twice a day x 1 week, daily x 1 week then stop 10 mL 0    ketorolac (ACULAR) 0.5 % ophthalmic solution Place 1 drop into the right eye 4 times daily. Start 2 days prior to surgery four times a day, after surgery: Four times a day x 1 week, three times a day x 1 week, twice a day x 1 week, daily x 1 week then stop 10 mL 0    moxifloxacin (VIGAMOX) 0.5 % ophthalmic solution Place 1 drop Into the left eye 4 times daily. Start 2 days prior to surgery four times a day. After Surgery: four times a day  X 1 week 3 mL 0    moxifloxacin (VIGAMOX) 0.5 % ophthalmic solution Place 1 drop into the right eye 4 times daily. Start 2 days prior to surgery four times a day, after surgery: Four times a day x 1 week 3 mL 0    prednisoLONE acetate (PRED FORTE) 1 % ophthalmic suspension Place 1-2 drops Into the left eye 4 times daily. After surgery: Four times a day x 1 week, three times a day x 1 week, twice a day x 1 week, daily x 1 week then stop 10 mL 0    prednisoLONE acetate (PRED FORTE) 1 % ophthalmic suspension Place 1-2 drops into the right eye 4 times daily. after surgery: Four times a day x 1 week, three times a day x 1 week, twice a day x 1 week, daily x 1  week then stop 10 mL 0    tadalafil (CIALIS) 10 MG tablet Take 1 tablet (10 mg) by mouth daily Take 1 tablet by mouth daily 90 tablet 3     No current facility-administered medications for this visit.     Facility-Administered Medications Ordered in Other Visits   Medication Dose Route Frequency Provider Last Rate Last Admin    dexAMETHasone PF (DECADRON) injection 4 mg  4 mg Intravenous Once PRN Oscar Pineda MD        fentaNYL (PF) (SUBLIMAZE) injection 25 mcg  25 mcg Intravenous Q5 Min PRN Oscar Pineda MD        fentaNYL (PF) (SUBLIMAZE) injection 50 mcg  50 mcg Intravenous Q5 Min PRN FullerOscar Mack MD        HYDROmorphone (PF) (DILAUDID) injection 0.2 mg  0.2 mg Intravenous Q5 Min PRN Oscar Pineda MD        HYDROmorphone (PF) (DILAUDID) injection 0.4 mg  0.4 mg Intravenous Q5 Min PRN Oscar Pineda MD        labetalol (NORMODYNE/TRANDATE) injection 10 mg  10 mg Intravenous Once PRN FullerOscar Mack MD        lactated ringers infusion   Intravenous Continuous Oscar Pineda MD        lactated ringers infusion   Intravenous Continuous Luis Fernando Powers MD        naloxone (NARCAN) injection 0.1 mg  0.1 mg Intravenous Q2 Min PRN Oscar Pineda MD        ondansetron (ZOFRAN ODT) ODT tab 4 mg  4 mg Oral Q30 Min PRN Oscar Pineda MD        Or    ondansetron (ZOFRAN) injection 4 mg  4 mg Intravenous Q30 Min PRN Oscar Pineda MD        prochlorperazine (COMPAZINE) injection 5 mg  5 mg Intravenous Q6H PRN Oscar Pineda MD         FHx:   PSHx: SB/cryotherapy 1990s, Cataract extraction/intraocular lens Powers right eye 08/29/24, Powers left eye 09/05/24       Current Eye Medications:      Assessment & Plan:  Pseudophakia, left eye, day 0  Powers left eye 09/05/24   Doing well  Keep patch in place at night for 7  days  Start post-operative drops and taper according to instructions  Post-operative do's and don'ts reviewed, questions answered    Recheck 1 week    Pseudophakia, right eye, day 0  Gio right eye 08/29/24     (Z86.69) History of retinal detachment  Patient understands increased risk of Retinal detachment  right eye given no PVD    (H52.13,  H52.203,  H52.4) Myopia of both eyes with astigmatism and presbyopia  Considering updating MRx vs Cataract extraction/intraocular lens    (H35.372) Epiretinal membrane (ERM) of left eye  Mild Epiretinal membrane left eye, secondary to prior RD        No follow-ups on file.        Luis Fernando Powers MD     Attending Physician Attestation:  Complete documentation of historical and exam elements from today's encounter can be found in the full encounter summary report (not reduplicated in this progress note).  I personally obtained the chief complaint(s) and history of present illness.  I confirmed and edited as necessary the review of systems, past medical/surgical history, family history, social history, and examination findings as documented by others; and I examined the patient myself.  I personally reviewed the relevant tests, images, and reports as documented above.  I formulated and edited as necessary the assessment and plan and discussed the findings and management plan with the patient and family. - Luis Fernando Powers MD

## 2024-09-04 ENCOUNTER — ANESTHESIA EVENT (OUTPATIENT)
Dept: SURGERY | Facility: AMBULATORY SURGERY CENTER | Age: 54
End: 2024-09-04
Payer: COMMERCIAL

## 2024-09-04 ENCOUNTER — OFFICE VISIT (OUTPATIENT)
Dept: OPHTHALMOLOGY | Facility: CLINIC | Age: 54
End: 2024-09-04
Attending: OPHTHALMOLOGY
Payer: COMMERCIAL

## 2024-09-04 DIAGNOSIS — Z96.1 PSEUDOPHAKIA, RIGHT EYE: Primary | ICD-10-CM

## 2024-09-04 PROCEDURE — 99024 POSTOP FOLLOW-UP VISIT: CPT | Performed by: OPHTHALMOLOGY

## 2024-09-04 ASSESSMENT — VISUAL ACUITY
OD_SC: 20/20
METHOD: SNELLEN - LINEAR
OD_SC+: -1

## 2024-09-04 ASSESSMENT — REFRACTION_MANIFEST
OD_CYLINDER: +0.25
OD_SPHERE: -0.25
OD_AXIS: 090

## 2024-09-04 NOTE — PROGRESS NOTES
Review of systems for the eyes was negative other than the pertinent positives/negatives listed in the HPI.      Assessment & Plan    HPI:  Manfred Young is a 53 year old male with history of ED, GERD presents for  Postoperative day 0 right eye     History of Retinal detachment  with cryotherapy/scleral buckle  POHx: Retinal detachment , myopia  PMHx:   Current Medications:   Current Outpatient Medications   Medication Sig Dispense Refill    ferrous fumarate-vitamin C ER (JUSTICE-SEQUELS) 65-25 MG CR tablet Take 1 tablet by mouth every other day 45 tablet 3    ketorolac (ACULAR) 0.5 % ophthalmic solution Place 1 drop into the right eye 4 times daily. Start 2 days prior to surgery four times a day, after surgery: Four times a day x 1 week, three times a day x 1 week, twice a day x 1 week, daily x 1 week then stop 10 mL 0    moxifloxacin (VIGAMOX) 0.5 % ophthalmic solution Place 1 drop into the right eye 4 times daily. Start 2 days prior to surgery four times a day, after surgery: Four times a day x 1 week 3 mL 0    prednisoLONE acetate (PRED FORTE) 1 % ophthalmic suspension Place 1-2 drops into the right eye 4 times daily. after surgery: Four times a day x 1 week, three times a day x 1 week, twice a day x 1 week, daily x 1 week then stop 10 mL 0    tadalafil (CIALIS) 10 MG tablet Take 1 tablet (10 mg) by mouth daily Take 1 tablet by mouth daily 90 tablet 3     No current facility-administered medications for this visit.     FHx:   PSHx: SB/cryotherapy 1990s, Cataract extraction/intraocular lens Powers right eye 08/29/24       Current Eye Medications:      Assessment & Plan:  Pseudophakia, right eye, day 0  Powers right eye 08/29/24   Doing well  Keep patch in place at night for 7 days  Start post-operative drops and taper according to instructions  Post-operative do's and don'ts reviewed, questions answered    Recheck 1 week    (H25.918) Combined forms of age-related cataract of both eyes  (primary  encounter diagnosis)  (H26.9) Cataract  Special equipment/needs:  Eye: left   Anesthesia:topical  Dilates to: 6.75  Iris expansion:  No  Pseudoexfoliation: No  Trypan Blue: No  Trauma: No    Able lay to flat: Yes  Blood Thinner: No   Tamsulosin: No  DM: No  Guttae: No    Dominant Eye: right    Plan: [place] toric Eyehance both eyes     Proceed with CE/IOL left eye -0.75-/-1.00    (Z86.69) History of retinal detachment  Patient understands increased risk of Retinal detachment  right eye given no PVD    (H52.13,  H52.203,  H52.4) Myopia of both eyes with astigmatism and presbyopia  Considering updating MRx vs Cataract extraction/intraocular lens    (H35.372) Epiretinal membrane (ERM) of left eye  Mild Epiretinal membrane left eye, secondary to prior RD        No follow-ups on file.        Luis Fernando Powers MD     Attending Physician Attestation:  Complete documentation of historical and exam elements from today's encounter can be found in the full encounter summary report (not reduplicated in this progress note).  I personally obtained the chief complaint(s) and history of present illness.  I confirmed and edited as necessary the review of systems, past medical/surgical history, family history, social history, and examination findings as documented by others; and I examined the patient myself.  I personally reviewed the relevant tests, images, and reports as documented above.  I formulated and edited as necessary the assessment and plan and discussed the findings and management plan with the patient and family. - Luis Fernando Powers MD

## 2024-09-05 ENCOUNTER — HOSPITAL ENCOUNTER (OUTPATIENT)
Facility: AMBULATORY SURGERY CENTER | Age: 54
Discharge: HOME | End: 2024-09-05
Attending: OPHTHALMOLOGY
Payer: COMMERCIAL

## 2024-09-05 ENCOUNTER — ANESTHESIA (OUTPATIENT)
Dept: SURGERY | Facility: AMBULATORY SURGERY CENTER | Age: 54
End: 2024-09-05
Payer: COMMERCIAL

## 2024-09-05 ENCOUNTER — OFFICE VISIT (OUTPATIENT)
Dept: OPHTHALMOLOGY | Facility: CLINIC | Age: 54
End: 2024-09-05

## 2024-09-05 VITALS
HEIGHT: 72 IN | OXYGEN SATURATION: 96 % | TEMPERATURE: 97.1 F | BODY MASS INDEX: 26.41 KG/M2 | HEART RATE: 48 BPM | DIASTOLIC BLOOD PRESSURE: 73 MMHG | RESPIRATION RATE: 17 BRPM | WEIGHT: 195 LBS | SYSTOLIC BLOOD PRESSURE: 102 MMHG

## 2024-09-05 DIAGNOSIS — H25.812 COMBINED FORM OF AGE-RELATED CATARACT, LEFT EYE: Primary | ICD-10-CM

## 2024-09-05 DIAGNOSIS — Z96.1 PSEUDOPHAKIA, LEFT EYE: Primary | ICD-10-CM

## 2024-09-05 PROCEDURE — 66984 XCAPSL CTRC RMVL W/O ECP: CPT | Performed by: NURSE ANESTHETIST, CERTIFIED REGISTERED

## 2024-09-05 PROCEDURE — 66984 XCAPSL CTRC RMVL W/O ECP: CPT | Performed by: ANESTHESIOLOGY

## 2024-09-05 PROCEDURE — V2599 CONTACT LENS/ES OTHER TYPE: HCPCS | Mod: DBP,LT

## 2024-09-05 PROCEDURE — 99024 POSTOP FOLLOW-UP VISIT: CPT | Performed by: OPHTHALMOLOGY

## 2024-09-05 PROCEDURE — 66984 XCAPSL CTRC RMVL W/O ECP: CPT | Mod: LT

## 2024-09-05 PROCEDURE — 66984 XCAPSL CTRC RMVL W/O ECP: CPT | Mod: 79 | Performed by: OPHTHALMOLOGY

## 2024-09-05 PROCEDURE — 96999 UNLISTED SPEC DERM SVC/PX: CPT | Mod: LT | Performed by: OPHTHALMOLOGY

## 2024-09-05 DEVICE — IMPLANTABLE DEVICE: Type: IMPLANTABLE DEVICE | Site: EYE | Status: FUNCTIONAL

## 2024-09-05 RX ORDER — BALANCED SALT SOLUTION 6.4; .75; .48; .3; 3.9; 1.7 MG/ML; MG/ML; MG/ML; MG/ML; MG/ML; MG/ML
SOLUTION OPHTHALMIC PRN
Status: DISCONTINUED | OUTPATIENT
Start: 2024-09-05 | End: 2024-09-05 | Stop reason: HOSPADM

## 2024-09-05 RX ORDER — CYCLOPENTOLAT/TROPIC/PHENYLEPH 1%-1%-2.5%
1 DROPS (EA) OPHTHALMIC (EYE)
Status: COMPLETED | OUTPATIENT
Start: 2024-09-05 | End: 2024-09-05

## 2024-09-05 RX ORDER — LIDOCAINE 40 MG/G
CREAM TOPICAL
Status: DISCONTINUED | OUTPATIENT
Start: 2024-09-05 | End: 2024-09-05 | Stop reason: HOSPADM

## 2024-09-05 RX ORDER — FENTANYL CITRATE 50 UG/ML
50 INJECTION, SOLUTION INTRAMUSCULAR; INTRAVENOUS EVERY 5 MIN PRN
Status: DISCONTINUED | OUTPATIENT
Start: 2024-09-05 | End: 2024-09-05 | Stop reason: HOSPADM

## 2024-09-05 RX ORDER — PROPARACAINE HYDROCHLORIDE 5 MG/ML
1 SOLUTION/ DROPS OPHTHALMIC ONCE
Status: DISCONTINUED | OUTPATIENT
Start: 2024-09-05 | End: 2024-09-05 | Stop reason: HOSPADM

## 2024-09-05 RX ORDER — SODIUM CHLORIDE, SODIUM LACTATE, POTASSIUM CHLORIDE, CALCIUM CHLORIDE 600; 310; 30; 20 MG/100ML; MG/100ML; MG/100ML; MG/100ML
INJECTION, SOLUTION INTRAVENOUS CONTINUOUS
Status: DISCONTINUED | OUTPATIENT
Start: 2024-09-05 | End: 2024-09-05 | Stop reason: HOSPADM

## 2024-09-05 RX ORDER — SODIUM CHLORIDE, SODIUM LACTATE, POTASSIUM CHLORIDE, CALCIUM CHLORIDE 600; 310; 30; 20 MG/100ML; MG/100ML; MG/100ML; MG/100ML
INJECTION, SOLUTION INTRAVENOUS CONTINUOUS
Status: DISCONTINUED | OUTPATIENT
Start: 2024-09-05 | End: 2024-09-06 | Stop reason: HOSPADM

## 2024-09-05 RX ORDER — MOXIFLOXACIN 5 MG/ML
1 SOLUTION/ DROPS OPHTHALMIC
Status: COMPLETED | OUTPATIENT
Start: 2024-09-05 | End: 2024-09-05

## 2024-09-05 RX ORDER — FENTANYL CITRATE 50 UG/ML
INJECTION, SOLUTION INTRAMUSCULAR; INTRAVENOUS PRN
Status: DISCONTINUED | OUTPATIENT
Start: 2024-09-05 | End: 2024-09-05

## 2024-09-05 RX ORDER — ONDANSETRON 4 MG/1
4 TABLET, ORALLY DISINTEGRATING ORAL EVERY 30 MIN PRN
Status: DISCONTINUED | OUTPATIENT
Start: 2024-09-05 | End: 2024-09-05 | Stop reason: HOSPADM

## 2024-09-05 RX ORDER — HYDROMORPHONE HYDROCHLORIDE 1 MG/ML
0.2 INJECTION, SOLUTION INTRAMUSCULAR; INTRAVENOUS; SUBCUTANEOUS EVERY 5 MIN PRN
Status: DISCONTINUED | OUTPATIENT
Start: 2024-09-05 | End: 2024-09-05 | Stop reason: HOSPADM

## 2024-09-05 RX ORDER — DICLOFENAC SODIUM 1 MG/ML
1 SOLUTION/ DROPS OPHTHALMIC
Status: COMPLETED | OUTPATIENT
Start: 2024-09-05 | End: 2024-09-05

## 2024-09-05 RX ORDER — MOXIFLOXACIN 5 MG/ML
1 SOLUTION/ DROPS OPHTHALMIC 4 TIMES DAILY
Qty: 3 ML | Refills: 0 | Status: SHIPPED | OUTPATIENT
Start: 2024-09-05

## 2024-09-05 RX ORDER — PROPARACAINE HYDROCHLORIDE 5 MG/ML
1 SOLUTION/ DROPS OPHTHALMIC
Status: DISCONTINUED | OUTPATIENT
Start: 2024-09-05 | End: 2024-09-05 | Stop reason: HOSPADM

## 2024-09-05 RX ORDER — TETRACAINE HYDROCHLORIDE 5 MG/ML
SOLUTION OPHTHALMIC PRN
Status: DISCONTINUED | OUTPATIENT
Start: 2024-09-05 | End: 2024-09-05 | Stop reason: HOSPADM

## 2024-09-05 RX ORDER — TIMOLOL MALEATE 5 MG/ML
SOLUTION/ DROPS OPHTHALMIC PRN
Status: DISCONTINUED | OUTPATIENT
Start: 2024-09-05 | End: 2024-09-05 | Stop reason: HOSPADM

## 2024-09-05 RX ORDER — MOXIFLOXACIN IN NACL,ISO-OS/PF 0.3MG/0.3
SYRINGE (ML) INTRAOCULAR PRN
Status: DISCONTINUED | OUTPATIENT
Start: 2024-09-05 | End: 2024-09-05 | Stop reason: HOSPADM

## 2024-09-05 RX ORDER — PREDNISOLONE ACETATE 10 MG/ML
1-2 SUSPENSION/ DROPS OPHTHALMIC 4 TIMES DAILY
Qty: 10 ML | Refills: 0 | Status: SHIPPED | OUTPATIENT
Start: 2024-09-05

## 2024-09-05 RX ORDER — KETOROLAC TROMETHAMINE 5 MG/ML
1 SOLUTION OPHTHALMIC 4 TIMES DAILY
Qty: 10 ML | Refills: 0 | Status: SHIPPED | OUTPATIENT
Start: 2024-09-05

## 2024-09-05 RX ORDER — ACETAMINOPHEN 325 MG/1
975 TABLET ORAL ONCE
Status: COMPLETED | OUTPATIENT
Start: 2024-09-05 | End: 2024-09-05

## 2024-09-05 RX ORDER — NALOXONE HYDROCHLORIDE 0.4 MG/ML
0.1 INJECTION, SOLUTION INTRAMUSCULAR; INTRAVENOUS; SUBCUTANEOUS
Status: DISCONTINUED | OUTPATIENT
Start: 2024-09-05 | End: 2024-09-05 | Stop reason: HOSPADM

## 2024-09-05 RX ORDER — FENTANYL CITRATE 50 UG/ML
25 INJECTION, SOLUTION INTRAMUSCULAR; INTRAVENOUS EVERY 5 MIN PRN
Status: DISCONTINUED | OUTPATIENT
Start: 2024-09-05 | End: 2024-09-05 | Stop reason: HOSPADM

## 2024-09-05 RX ORDER — DEXAMETHASONE SODIUM PHOSPHATE 10 MG/ML
4 INJECTION, SOLUTION INTRAMUSCULAR; INTRAVENOUS
Status: DISCONTINUED | OUTPATIENT
Start: 2024-09-05 | End: 2024-09-05 | Stop reason: HOSPADM

## 2024-09-05 RX ORDER — LABETALOL HYDROCHLORIDE 5 MG/ML
10 INJECTION, SOLUTION INTRAVENOUS
Status: DISCONTINUED | OUTPATIENT
Start: 2024-09-05 | End: 2024-09-05 | Stop reason: HOSPADM

## 2024-09-05 RX ORDER — LIDOCAINE HYDROCHLORIDE 10 MG/ML
INJECTION, SOLUTION EPIDURAL; INFILTRATION; INTRACAUDAL; PERINEURAL PRN
Status: DISCONTINUED | OUTPATIENT
Start: 2024-09-05 | End: 2024-09-05 | Stop reason: HOSPADM

## 2024-09-05 RX ORDER — ONDANSETRON 2 MG/ML
4 INJECTION INTRAMUSCULAR; INTRAVENOUS EVERY 30 MIN PRN
Status: DISCONTINUED | OUTPATIENT
Start: 2024-09-05 | End: 2024-09-05 | Stop reason: HOSPADM

## 2024-09-05 RX ORDER — HYDROMORPHONE HYDROCHLORIDE 1 MG/ML
0.4 INJECTION, SOLUTION INTRAMUSCULAR; INTRAVENOUS; SUBCUTANEOUS EVERY 5 MIN PRN
Status: DISCONTINUED | OUTPATIENT
Start: 2024-09-05 | End: 2024-09-05 | Stop reason: HOSPADM

## 2024-09-05 RX ADMIN — MOXIFLOXACIN 1 DROP: 5 SOLUTION/ DROPS OPHTHALMIC at 08:03

## 2024-09-05 RX ADMIN — Medication 1 DROP: at 08:03

## 2024-09-05 RX ADMIN — DICLOFENAC SODIUM 1 DROP: 1 SOLUTION/ DROPS OPHTHALMIC at 08:03

## 2024-09-05 RX ADMIN — SODIUM CHLORIDE, SODIUM LACTATE, POTASSIUM CHLORIDE, CALCIUM CHLORIDE: 600; 310; 30; 20 INJECTION, SOLUTION INTRAVENOUS at 08:06

## 2024-09-05 RX ADMIN — Medication 1 DROP: at 07:58

## 2024-09-05 RX ADMIN — MOXIFLOXACIN 1 DROP: 5 SOLUTION/ DROPS OPHTHALMIC at 08:09

## 2024-09-05 RX ADMIN — DICLOFENAC SODIUM 1 DROP: 1 SOLUTION/ DROPS OPHTHALMIC at 07:58

## 2024-09-05 RX ADMIN — FENTANYL CITRATE 50 MCG: 50 INJECTION, SOLUTION INTRAMUSCULAR; INTRAVENOUS at 09:18

## 2024-09-05 RX ADMIN — PROPARACAINE HYDROCHLORIDE 1 DROP: 5 SOLUTION/ DROPS OPHTHALMIC at 07:57

## 2024-09-05 RX ADMIN — Medication 1 DROP: at 08:09

## 2024-09-05 RX ADMIN — ACETAMINOPHEN 975 MG: 325 TABLET ORAL at 08:04

## 2024-09-05 RX ADMIN — FENTANYL CITRATE 50 MCG: 50 INJECTION, SOLUTION INTRAMUSCULAR; INTRAVENOUS at 09:22

## 2024-09-05 RX ADMIN — DICLOFENAC SODIUM 1 DROP: 1 SOLUTION/ DROPS OPHTHALMIC at 08:09

## 2024-09-05 RX ADMIN — MOXIFLOXACIN 1 DROP: 5 SOLUTION/ DROPS OPHTHALMIC at 07:57

## 2024-09-05 ASSESSMENT — TONOMETRY
OS_IOP_MMHG: 13
IOP_METHOD: TONOPEN

## 2024-09-05 ASSESSMENT — VISUAL ACUITY
METHOD: SNELLEN - LINEAR
OS_SC: 20/32

## 2024-09-05 ASSESSMENT — SLIT LAMP EXAM - LIDS: COMMENTS: NORMAL

## 2024-09-05 NOTE — ANESTHESIA CARE TRANSFER NOTE
Patient: Manfred Young    Procedure: Procedure(s):  LEFT EYE PHACOEMULSIFICATION, CATARACT, WITH INTRAOCULAR LENS IMPLANT, TORIC LENS       Diagnosis: Combined forms of age-related cataract of both eyes [H25.813]  Diagnosis Additional Information: No value filed.    Anesthesia Type:   MAC     Note:    Oropharynx: oropharynx clear of all foreign objects and spontaneously breathing  Level of Consciousness: awake  Oxygen Supplementation: room air    Independent Airway: airway patency satisfactory and stable  Dentition: dentition unchanged  Vital Signs Stable: post-procedure vital signs reviewed and stable  Report to RN Given: handoff report given  Patient transferred to: Phase II  Comments: VSS and WNL, comfortable, no PONV, report to Angeline RN  Handoff Report: Identifed the Patient, Identified the Reponsible Provider, Reviewed the pertinent medical history, Discussed the surgical course, Reviewed Intra-OP anesthesia mangement and issues during anesthesia, Set expectations for post-procedure period and Allowed opportunity for questions and acknowledgement of understanding      Vitals:  Vitals Value Taken Time   BP     Temp     Pulse     Resp     SpO2         Electronically Signed By: GILDARDO Hutchinson CRNA  September 5, 2024  9:59 AM

## 2024-09-05 NOTE — OP NOTE
PREOPERATIVE DIAGNOSIS: Visually significant cataract, Left eye   POSTOPERATIVE DIAGNOSIS: Same   PROCEDURES:   1. Cataract extraction with intraocular lens implant Left eye.  SURGEON: Luis Fernando Powers M.D.  ASSISTANT: Rich Lepe MD  INDICATIONS: The patient Manfred Young presented to the eye clinic with decreased vision secondary to cataract in the Left eye. The risks, benefits and alternatives to cataract extraction were discussed. The patient elected to proceed. All questions were answered to the patient's satisfaction.   DESCRIPTION OF PROCEDURE:   Prior to the procedure, appropriate cardiac and respiratory monitors were applied to the patient.  In the pre-operative holding area, a drop of topical tetracaine was placed.  The patient was brought to the operating room where a surgical pause was carried out to identify with all members of the surgical team the correct surgical site.  With adequate anesthesia, the Left eye was prepped and draped in the usual sterile fashion. A lid speculum was placed, and the operating microscope was rotated into position. The Digital Marking Microscope was engaged and aligned with conjunctival vessels. A paracentesis was created.  Through this limbal paracentesis, the anterior chamber was filled with preservative-free lidocaine followed by viscoelastic.  A temporal wound was created at the limbus using a 2.6 mm blade. A capsulorrhexis was initiated using a bent 25-gauge needle and was completed in continuous and circular fashion using the capsulorrhexis forceps. The lens nucleus was hydrodissected using balanced salt solution.  The lens nucleus was rotated and removed using phacoemulsification in a stop and chop technique.  Residual cortical material was removed using irrigation-aspiration.  The capsular bag was reinflated to its maximal extent with cohesive viscoelastic.  A 12.5 diopter [id number] was inserted into the capsular bag and aligned based on the Digital  Marking system at 100 degrees.  The lens power selected was reviewed using the intraocular lens power measurements that were obtained preoperatively to confirm that the correct lens was selected for the desired post-operative refractive state. The residual viscoelastic was removed in its entirety, the wounds were hydrated and found to be self-sealing.  Intracameral moxifloxacin was administered. Tactile pressure was confirmed to be in a normal range.  The lid speculum was removed and a patch and shield were applied.   The patient tolerated the procedure well, and there were no complications.    PLAN: The patient will be discharged to home and will follow up today  EBL:  None  Complications:  None  Implant Name Type Inv. Item Serial No.  Lot No. LRB No. Used Action   LENS IOL TECNIS TORIC  [id number] 12.5 [id number] - [id number] Lens/Eye Implant LENS IOL TECNIS TORIC  [id number] 12.5 [id number] [employer]  Left 1 Implanted   Attending Physician Procedure Attestation: I was present for the entire procedure and was available to assist as needed-Luis Fernando Powers MD

## 2024-09-05 NOTE — ANESTHESIA POSTPROCEDURE EVALUATION
Patient: Manfred Young    Procedure: Procedure(s):  LEFT EYE PHACOEMULSIFICATION, CATARACT, WITH INTRAOCULAR LENS IMPLANT, TORIC LENS       Anesthesia Type:  MAC    Note:  Disposition: Outpatient   Postop Pain Control: Uneventful            Sign Out: Well controlled pain   PONV: No   Neuro/Psych: Uneventful            Sign Out: Acceptable/Baseline neuro status   Airway/Respiratory: Uneventful            Sign Out: Acceptable/Baseline resp. status   CV/Hemodynamics: Uneventful            Sign Out: Acceptable CV status; No obvious hypovolemia; No obvious fluid overload   Other NRE: NONE   DID A NON-ROUTINE EVENT OCCUR?            Last vitals:  Vitals Value Taken Time   /73 09/05/24 1013   Temp 36.2  C (97.1  F) 09/05/24 0959   Pulse 48 09/05/24 1013   Resp 17 09/05/24 1013   SpO2 96 % 09/05/24 1013       Electronically Signed By: Joel Lowe MD  September 5, 2024  12:25 PM

## 2024-09-05 NOTE — DISCHARGE INSTRUCTIONS
[facility]  Home Care Following Anesthesia  For 24 hours after surgery:  Get plenty of rest.  A responsible adult must stay with you for at least 24 hours after you leave the surgery center.  Do not drive or use heavy equipment.  If you have weakness or tingling, don't drive or use heavy equipment until this feeling goes away.   Do not drink alcohol.   Avoid strenuous or risky activities.  Ask for help when climbing stairs.  You may feel lightheaded.  IF so, sit for a few minutes before standing.  Have someone help you get up.   If you have nausea (feel sick to your stomach): Drink only clear liquids such as apple juice, ginger ale, broth or 7-Up.  Rest may also help.  Be sure to drink enough fluids.  Move to a regular diet as you feel able.   You may have a slight fever.  Call the doctor if your fever is over 100 F (37.7 C) (taken under the tongue) or lasts longer than 24 hours.  You may have a dry mouth, a sore throat, muscle aches or trouble sleeping. These should go away after 24 hours.  Do not make important or legal decisions.   It is recommended to avoid smoking.               Tips for taking pain medications  To get the best pain relief possible, remember these points:  Take pain medications as directed, before pain becomes severe.  Pain medication can upset your stomach: taking it with food may help.  Constipation is a common side effect of pain medication. Drink plenty of  fluids.  Eat foods high in fiber. Take a stool softener if recommended by your doctor or pharmacist.  Do not drink alcohol, drive or operate machinery while taking pain medications.  Ask about other ways to control pain, such as with heat, ice or relaxation.    Tylenol/Acetaminophen Consumption    If you feel your pain relief is insufficient, you may take Tylenol/Acetaminophen in addition to your narcotic pain medication.   Be careful not to exceed 4,000 mg of Tylenol/Acetaminophen in a 24 hour  period from all sources.  If you are taking extra strength Tylenol/acetaminophen (500 mg), the maximum dose is 8 tablets in 24 hours.  If you are taking regular strength acetaminophen (325 mg), the maximum dose is 12 tablets in 24 hours.    Call a doctor for any of the following:  Signs of infection (fever, growing tenderness at the surgery site, a large amount of drainage or bleeding, severe pain, foul-smelling drainage, redness, swelling).  It has been over 8 to 10 hours since surgery and you are still not able to urinate (pass water).  Headache for over 24 hours.  Numbness, tingling or weakness the day after surgery (if you had spinal anesthesia).  Signs of Covid-19 infection (temperature over 100 degrees, shortness of breath, cough, loss of taste/smell, generalized body aches, persistent headache, chills, sore throat, nausea/vomiting/diarrhea)  Your doctor is:  Dr. Luis Fernando Powers, Ophthalmology: [phone]                  Or dial [phone] and ask for the resident on call for:  Ophthalmology  For emergency care, call the:  [facility]:  [phone] (TTY for hearing impaired: [phone])    Tylenol 975 mg given at 8 am.   Ok to take more after 2 pm today.

## 2024-09-05 NOTE — ANESTHESIA PREPROCEDURE EVALUATION
Anesthesia Pre-Procedure Evaluation    Patient: Manfred Young   MRN: [id number] : 1970        Procedure : Procedure(s):  LEFT EYE PHACOEMULSIFICATION, CATARACT, WITH INTRAOCULAR LENS IMPLANT, TORIC LENS          Past Medical History:   Diagnosis Date    NO ACTIVE PROBLEMS     Symptomatic cholelithiasis       Past Surgical History:   Procedure Laterality Date    LAPAROSCOPIC CHOLECYSTECTOMY N/A 2023    Procedure: Laparoscopic cholecystectomy;  Surgeon: Cody Whitmore MD;  Location: [facility] OR    PHACOEMULSIFICATION CLEAR CORNEA WITH TORIC INTRAOCULAR LENS IMPLANT Right 2024    Procedure: RIGHT EYE PHACOEMULSIFICATION, CATARACT, WITH INTRAOCULAR LENS IMPLANT, TORIC LENS;  Surgeon: Luis Fernando Powers MD;  Location: [facility] OR    RETINAL REATTACHMENT      SURGICAL HISTORY OF -       detached retina    SURGICAL HISTORY OF -       arthroscopic knee surgery-Right x1, left x1, plica repair      Allergies   Allergen Reactions    Bee Venom Swelling     Gets hot    Chlorhexidine Gluconate Itching     Has a sensitivity    Penicillins Diarrhea    Wasp Venom Protein Other (See Comments)     High fever after being stung by bee.      Social History     Tobacco Use    Smoking status: Never    Smokeless tobacco: Never   Substance Use Topics    Alcohol use: Yes      Wt Readings from Last 1 Encounters:   24 88.5 kg (195 lb)        Anesthesia Evaluation            ROS/MED HX  ENT/Pulmonary:  - neg pulmonary ROS     Neurologic:  - neg neurologic ROS     Cardiovascular:       METS/Exercise Tolerance:     Hematologic:     (+)      anemia,          Musculoskeletal:       GI/Hepatic:       Renal/Genitourinary:     (+)       Nephrolithiasis ,       Endo:       Psychiatric/Substance Use:  - neg psychiatric ROS     Infectious Disease:       Malignancy:       Other:            Physical Exam    Airway  airway exam normal      Mallampati: I       Respiratory Devices and Support         Dental       (+) Minor  "Abnormalities - some fillings, tiny chips      Cardiovascular   cardiovascular exam normal          Pulmonary   pulmonary exam normal                OUTSIDE LABS:  CBC:   Lab Results   Component Value Date    WBC 5.8 01/09/2024    WBC 4.8 08/07/2023    HGB 11.9 (L) 01/09/2024    HGB 12.3 (L) 08/07/2023    HCT 40.4 01/09/2024    HCT 39.5 (L) 08/07/2023     01/09/2024     08/07/2023     BMP:   Lab Results   Component Value Date     01/09/2024     08/07/2023    POTASSIUM 3.9 01/09/2024    POTASSIUM 3.9 08/07/2023    CHLORIDE 105 01/09/2024    CHLORIDE 106 08/07/2023    CO2 29 01/09/2024    CO2 24 08/07/2023    BUN 13.5 01/09/2024    BUN 11.0 08/07/2023    CR 0.91 01/09/2024    CR 0.89 08/07/2023    GLC 94 01/09/2024     (H) 08/07/2023     COAGS:   Lab Results   Component Value Date    PTT 27 04/12/2021    INR 1.04 04/12/2021     POC: No results found for: \"BGM\", \"HCG\", \"HCGS\"  HEPATIC:   Lab Results   Component Value Date    ALBUMIN 4.3 08/07/2023    PROTTOTAL 6.4 08/07/2023    ALT 19 08/07/2023    AST 17 08/07/2023    ALKPHOS 56 08/07/2023    BILITOTAL 0.3 08/07/2023     OTHER:   Lab Results   Component Value Date    A1C 5.2 04/12/2021    ROSENDO 9.2 01/09/2024    PHOS 3.0 04/12/2021    LIPASE 48 08/07/2023       Anesthesia Plan    ASA Status:  1    NPO Status:  NPO Appropriate    Anesthesia Type: MAC.     - Reason for MAC: straight local not clinically adequate   Induction: Intravenous.   Maintenance: TIVA.        Consents    Anesthesia Plan(s) and associated risks, benefits, and realistic alternatives discussed. Questions answered and patient/representative(s) expressed understanding.     - Discussed: Risks, Benefits and Alternatives for BOTH SEDATION and the PROCEDURE were discussed     - Discussed with:  Patient            Postoperative Care    Pain management: IV analgesics, Oral pain medications, Multi-modal analgesia.   PONV prophylaxis: Ondansetron (or other 5HT-3), Dexamethasone " "or Solumedrol     Comments:               Joel Lowe MD    I have reviewed the pertinent notes and labs in the chart from the past 30 days and (re)examined the patient.  Any updates or changes from those notes are reflected in this note.              # Overweight: Estimated body mass index is 26.44 kg/m  as calculated from the following:    Height as of 8/29/24: 1.829 m (6' 0.01\").    Weight as of 8/29/24: 88.5 kg (195 lb).      "

## 2024-09-11 ENCOUNTER — OFFICE VISIT (OUTPATIENT)
Dept: OPHTHALMOLOGY | Facility: CLINIC | Age: 54
End: 2024-09-11
Attending: OPHTHALMOLOGY
Payer: COMMERCIAL

## 2024-09-11 DIAGNOSIS — Z96.1 PSEUDOPHAKIA, BOTH EYES: Primary | ICD-10-CM

## 2024-09-11 PROCEDURE — 99024 POSTOP FOLLOW-UP VISIT: CPT | Performed by: OPHTHALMOLOGY

## 2024-09-11 PROCEDURE — G0463 HOSPITAL OUTPT CLINIC VISIT: HCPCS | Performed by: OPHTHALMOLOGY

## 2024-09-11 ASSESSMENT — VISUAL ACUITY
METHOD_MR_RETINOSCOPY: 1
OS_SC: 20/40
METHOD_MR: DX PURPOSES
OS_SC+: -2
METHOD: SNELLEN - LINEAR
OD_SC: 20/20

## 2024-09-11 ASSESSMENT — REFRACTION_MANIFEST
OD_CYLINDER: SPHERE
OS_AXIS: 090
OD_SPHERE: -0.50
OS_SPHERE: -1.50
OS_CYLINDER: +1.00

## 2024-09-11 ASSESSMENT — SLIT LAMP EXAM - LIDS
COMMENTS: NORMAL
COMMENTS: NORMAL

## 2024-09-11 ASSESSMENT — TONOMETRY
OS_IOP_MMHG: 19
IOP_METHOD: ICARE
OD_IOP_MMHG: 20

## 2024-09-11 NOTE — PROGRESS NOTES
HPI       Post Op (Ophthalmology) Left Eye    In left eye.  Since onset it is stable.  Associated symptoms include haloes and flashes.  Negative for eye pain and floaters.  Treatments tried include eye drops.             Comments    Here for post op left eye. [facility] doing well. Some irritation. Occasional strobing lights. No floaters.    Cruz Mora COT 7:45 AM September 11, 2024             Last edited by Cruz Mora on 9/11/2024  7:45 AM.           Review of systems for the eyes was negative other than the pertinent positives/negatives listed in the HPI.      Assessment & Plan    HPI:  Manfred Young is a 53 year old male with history of ED, GERD presents for  Postoperative week 1 right eye     History of Retinal detachment  with cryotherapy/scleral buckle  POHx: Retinal detachment , myopia  PMHx:   Current Medications:   Current Outpatient Medications   Medication Sig Dispense Refill    ferrous fumarate-vitamin C ER (JUSTICE-SEQUELS) 65-25 MG CR tablet Take 1 tablet by mouth every other day 45 tablet 3    ketorolac (ACULAR) 0.5 % ophthalmic solution Place 1 drop Into the left eye 4 times daily. Start 2 days prior to surgery four times a day, after surgery: Four times a day x 1 week, three times a day x 1 week, twice a day x 1 week, daily x 1 week then stop 10 mL 0    ketorolac (ACULAR) 0.5 % ophthalmic solution Place 1 drop into the right eye 4 times daily. Start 2 days prior to surgery four times a day, after surgery: Four times a day x 1 week, three times a day x 1 week, twice a day x 1 week, daily x 1 week then stop 10 mL 0    moxifloxacin (VIGAMOX) 0.5 % ophthalmic solution Place 1 drop Into the left eye 4 times daily. Start 2 days prior to surgery four times a day. After Surgery: four times a day  X 1 week 3 mL 0    moxifloxacin (VIGAMOX) 0.5 % ophthalmic solution Place 1 drop into the right eye 4 times daily. Start 2 days prior to surgery four times a day, after surgery: Four times a day x 1 week 3 mL 0     prednisoLONE acetate (PRED FORTE) 1 % ophthalmic suspension Place 1-2 drops Into the left eye 4 times daily. After surgery: Four times a day x 1 week, three times a day x 1 week, twice a day x 1 week, daily x 1 week then stop 10 mL 0    prednisoLONE acetate (PRED FORTE) 1 % ophthalmic suspension Place 1-2 drops into the right eye 4 times daily. after surgery: Four times a day x 1 week, three times a day x 1 week, twice a day x 1 week, daily x 1 week then stop 10 mL 0    tadalafil (CIALIS) 10 MG tablet Take 1 tablet (10 mg) by mouth daily Take 1 tablet by mouth daily 90 tablet 3     No current facility-administered medications for this visit.     FHx:   PSHx: SB/cryotherapy 1990s, Cataract extraction/intraocular lens Powers right eye 08/29/24, Powers left eye 09/05/24       Current Eye Medications:  Moxifloxacin/pred forte/ketorolac four times a day left eye   pred forte/ketorolac three times a day right eye       Assessment & Plan:  (Z96.1) Pseudophakia, both eyes  (primary encounter diagnosis)  Powers left eye 09/05/24   Powers right eye 08/29/24   Continue drop taper    (Z86.69) History of retinal detachment  Patient understands increased risk of Retinal detachment  right eye given no PVD    (H52.13,  H52.203,  H52.4) Myopia of both eyes with astigmatism and presbyopia  Considering updating MRx vs Cataract extraction/intraocular lens    (H35.372) Epiretinal membrane (ERM) of left eye  Mild Epiretinal membrane left eye, secondary to prior RD        No follow-ups on file.        Luis Fernando Powers MD     Attending Physician Attestation:  Complete documentation of historical and exam elements from today's encounter can be found in the full encounter summary report (not reduplicated in this progress note).  I personally obtained the chief complaint(s) and history of present illness.  I confirmed and edited as necessary the review of systems, past medical/surgical history, family history, social history,  and examination findings as documented by others; and I examined the patient myself.  I personally reviewed the relevant tests, images, and reports as documented above.  I formulated and edited as necessary the assessment and plan and discussed the findings and management plan with the patient and family. - Luis Fernando Powers MD

## 2024-09-16 ENCOUNTER — THERAPY VISIT (OUTPATIENT)
Dept: PHYSICAL THERAPY | Facility: CLINIC | Age: 54
End: 2024-09-16
Payer: COMMERCIAL

## 2024-09-16 DIAGNOSIS — M25.511 ACUTE PAIN OF RIGHT SHOULDER: Primary | ICD-10-CM

## 2024-09-16 PROCEDURE — 97110 THERAPEUTIC EXERCISES: CPT | Mod: GP

## 2024-09-16 NOTE — PROGRESS NOTES
09/16/24 0500   Appointment Info   Signing clinician's name / credentials Benita Hamilton DPT   Total/Authorized Visits E&T 8 POC   Visits Used 2   Medical Diagnosis Acute pain of right shoulder   PT Tx Diagnosis R shoulder pain   Progress Note/Certification   Therapy Frequency 1x/week   Predicted Duration 8 weeks   Progress Note Due Date 10/25/24   Progress Note Completed Date 08/26/24   PT Goal 1   Goal Identifier Lifting   Goal Description Patient will be able to lift 10# overhead w/o increased pain   Rationale to maximize safety and independence with performance of ADLs and functional tasks;to maximize safety and independence within the home;to maximize safety and independence within the community;to maximize safety and independence with self cares   Target Date 10/21/24   Date Met 09/16/24   Subjective Report   Subjective Report Reports his shoulder is feeling pretty good, exercises have felt good. Shoulder can still be a little bothersome if he lies on it for to long, otherwise feeling really good.   Objective Measures   Objective Measures Objective Measure 1   Objective Measure 1   Objective Measure shoulder assessment   Details ROM WNL and pain free. MMT 5/5 and pain free all directions.   Treatment Interventions (PT)   Interventions Therapeutic Procedure/Exercise   Therapeutic Procedure/Exercise   Therapeutic Procedures: strength, endurance, ROM, flexibility minutes (20004) 25   PTRx Ther Proc 1 Cable Tricep Extension Standing   PTRx Ther Proc 1 - Details 15x blue TB no pain   PTRx Ther Proc 2 Shoulder Theraband Low Row/Pulldown   PTRx Ther Proc 2 - Details 15x blue tc and vc for scap retraction   PTRx Ther Proc 3 Standing Passive Shoulder Flexion   PTRx Ther Proc 3 - Details VR continue for ROM   Skilled Intervention Education on purpose/benefit of HEP based on patient history and exam findings. Patient should not push into pain with exercises, education on how to adjust program based on how it feels  and goals of strengthening/ROM for increased function and pain relief. Cues for proper positioning.   Patient Response/Progress mild increased pain after, edu on HEP adjustments based on pain response   PTRx Ther Proc 4 Shoulder Horizontal Abduction/Diagonals With Theraband   PTRx Ther Proc 4 - Details 10x straight 5x diagonals red TB plan for green at home   PTRx Ther Proc 5 Shoulder Theraband Internal Rotation   PTRx Ther Proc 5 - Details 15x blue TB cues for positioning   PTRx Ther Proc 6 Shoulder Theraband External Rotation   PTRx Ther Proc 6 - Details 15x green TB vc and tc for positioning   Education   Learner/Method Patient;Demonstration;Pictures/Video;No Barriers to Learning   Plan   Home program PTRx   Updates to plan of care added further shoulder health exercises   Plan for next session discharge   Total Session Time   Timed Code Treatment Minutes 25   Total Treatment Time (sum of timed and untimed services) 25         DISCHARGE  Reason for Discharge: Patient has met all goals.    Equipment Issued: none    Discharge Plan: Patient to continue home program.    Referring Provider:  Bethany Graham

## 2024-09-17 ENCOUNTER — TELEPHONE (OUTPATIENT)
Dept: OPHTHALMOLOGY | Facility: CLINIC | Age: 54
End: 2024-09-17
Payer: COMMERCIAL

## 2024-09-17 NOTE — TELEPHONE ENCOUNTER
Appointment Request From: Manfred Young     With Provider: Luis Fernando Powers [[facility]]     Preferred Date Range: Any     Preferred Times: Any Time     Reason for visit: Request an Appointment     Comments:  My brain isn't liking the mono vision. Not getting used to it, it's discombobulating. Also really tiring. Wondering about options, and if they should be sooner than my next appointment on 10/9. Thanks    --------------------------------------    [facility].     Christina Yip RN 8:41 AM 09/17/24

## 2024-09-19 ENCOUNTER — TELEPHONE (OUTPATIENT)
Dept: OPHTHALMOLOGY | Facility: CLINIC | Age: 54
End: 2024-09-19
Payer: COMMERCIAL

## 2024-09-19 NOTE — TELEPHONE ENCOUNTER
Patient called and LVM for surgery scheduling and states he has a post-op coming up however, the surgery is not working for him and wants to know what his options are.    He is asking for a call back to discuss. Will route to clinic.     Karissa Abdi on 9/19/2024 at 1:51 PM

## 2024-09-20 NOTE — TELEPHONE ENCOUNTER
Left eye for near and right eye for distance following cataract surgery    After two week pt not tolerating the monovision and feels focal point for left eye about 3 ft.    Pt would like to come in sooner for evaluation and review options.    Scheduled next week Wednesday with Dr. Powers in open return time slot.    Pt satisfied with scheduling.    Manuel Calvert RN 12:13 PM 09/20/24

## 2024-09-25 ENCOUNTER — OFFICE VISIT (OUTPATIENT)
Dept: OPHTHALMOLOGY | Facility: CLINIC | Age: 54
End: 2024-09-25
Attending: OPHTHALMOLOGY
Payer: COMMERCIAL

## 2024-09-25 DIAGNOSIS — H52.4 MYOPIA OF BOTH EYES WITH ASTIGMATISM AND PRESBYOPIA: ICD-10-CM

## 2024-09-25 DIAGNOSIS — H52.203 MYOPIA OF BOTH EYES WITH ASTIGMATISM AND PRESBYOPIA: ICD-10-CM

## 2024-09-25 DIAGNOSIS — Z96.1 PSEUDOPHAKIA, BOTH EYES: Primary | ICD-10-CM

## 2024-09-25 DIAGNOSIS — H52.13 MYOPIA OF BOTH EYES WITH ASTIGMATISM AND PRESBYOPIA: ICD-10-CM

## 2024-09-25 PROCEDURE — 99024 POSTOP FOLLOW-UP VISIT: CPT | Performed by: OPHTHALMOLOGY

## 2024-09-25 PROCEDURE — G0463 HOSPITAL OUTPT CLINIC VISIT: HCPCS | Performed by: OPHTHALMOLOGY

## 2024-09-25 ASSESSMENT — CONF VISUAL FIELD
OS_SUPERIOR_TEMPORAL_RESTRICTION: 0
OD_NORMAL: 1
OD_SUPERIOR_TEMPORAL_RESTRICTION: 0
OS_INFERIOR_NASAL_RESTRICTION: 0
OD_INFERIOR_TEMPORAL_RESTRICTION: 0
OD_SUPERIOR_NASAL_RESTRICTION: 0
METHOD: COUNTING FINGERS
OS_NORMAL: 1
OS_INFERIOR_TEMPORAL_RESTRICTION: 0
OD_INFERIOR_NASAL_RESTRICTION: 0
OS_SUPERIOR_NASAL_RESTRICTION: 0

## 2024-09-25 ASSESSMENT — VISUAL ACUITY
OS_PH_SC: 20/25
OD_SC: 20/20
METHOD: SNELLEN - LINEAR
OS_SC: 20/40
OS_SC+: -1

## 2024-09-25 ASSESSMENT — REFRACTION_MANIFEST
OS_ADD: +2.50
OD_SPHERE: -0.25
OD_CYLINDER: +0.25
OS_SPHERE: -1.75
OD_ADD: +2.50
OS_AXIS: 097
OS_CYLINDER: +0.75
OD_AXIS: 180

## 2024-09-25 ASSESSMENT — TONOMETRY
OS_IOP_MMHG: 17
IOP_METHOD: TONOPEN
OD_IOP_MMHG: 11

## 2024-09-25 ASSESSMENT — SLIT LAMP EXAM - LIDS
COMMENTS: NORMAL
COMMENTS: NORMAL

## 2024-09-25 NOTE — PROGRESS NOTES
HPI       Post Op (Ophthalmology) Both Eyes    In both eyes.  Associated symptoms include dryness.  Negative for eye pain, flashes, floaters and discharge.  Treatments tried include eye drops. Additional comments: Visit for post-op left eye 09/05/24 right eye 08/29/24                Comments    Patient reports no change to vision, with blurriness and not adjusting well.  Patient note dryness.  Patient reports needing more light for best reading.     Ocular Meds:   AT's x6 each eye     PRED FORTE 1 % once right eye and twice left eye  Ketorlac once right eye and twice left eye      No DM.   Viviana Drake OA 10:19 AM September 25, 2024           Last edited by Viviana Drake on 9/25/2024 10:26 AM.           Review of systems for the eyes was negative other than the pertinent positives/negatives listed in the HPI.      Assessment & Plan    HPI:  Manfred Young is a 53 year old male with history of ED, GERD presents for  Postoperative week 3 right eye. Noting difficulty with monovision    History of Retinal detachment  with cryotherapy/scleral buckle  POHx: Retinal detachment , myopia  PMHx:   Current Medications:   Current Outpatient Medications   Medication Sig Dispense Refill    ferrous fumarate-vitamin C ER (JUSTICE-SEQUELS) 65-25 MG CR tablet Take 1 tablet by mouth every other day 45 tablet 3    ketorolac (ACULAR) 0.5 % ophthalmic solution Place 1 drop Into the left eye 4 times daily. Start 2 days prior to surgery four times a day, after surgery: Four times a day x 1 week, three times a day x 1 week, twice a day x 1 week, daily x 1 week then stop 10 mL 0    prednisoLONE acetate (PRED FORTE) 1 % ophthalmic suspension Place 1-2 drops into the right eye 4 times daily. after surgery: Four times a day x 1 week, three times a day x 1 week, twice a day x 1 week, daily x 1 week then stop 10 mL 0    tadalafil (CIALIS) 10 MG tablet Take 1 tablet (10 mg) by mouth daily Take 1 tablet by mouth daily 90 tablet 3    ketorolac  (ACULAR) 0.5 % ophthalmic solution Place 1 drop into the right eye 4 times daily. Start 2 days prior to surgery four times a day, after surgery: Four times a day x 1 week, three times a day x 1 week, twice a day x 1 week, daily x 1 week then stop 10 mL 0    moxifloxacin (VIGAMOX) 0.5 % ophthalmic solution Place 1 drop Into the left eye 4 times daily. Start 2 days prior to surgery four times a day. After Surgery: four times a day  X 1 week 3 mL 0    moxifloxacin (VIGAMOX) 0.5 % ophthalmic solution Place 1 drop into the right eye 4 times daily. Start 2 days prior to surgery four times a day, after surgery: Four times a day x 1 week 3 mL 0    prednisoLONE acetate (PRED FORTE) 1 % ophthalmic suspension Place 1-2 drops Into the left eye 4 times daily. After surgery: Four times a day x 1 week, three times a day x 1 week, twice a day x 1 week, daily x 1 week then stop 10 mL 0     No current facility-administered medications for this visit.     FHx:   PSHx: SB/cryotherapy 1990s, Cataract extraction/intraocular lens Powers right eye 08/29/24, Powers left eye 09/05/24      Current Eye Medications:  pred forte/ketorolac three times a day left eye   pred forte/ketorolac one times a day right eye       Assessment & Plan:  (Z96.1) Pseudophakia, both eyes   (H52.13,  H52.203,  H52.4) Myopia of both eyes with astigmatism and presbyopia  Powers left eye 09/05/24  Powers right eye 08/29/24  Continue drop taper    Having difficulty with mini-monovision  Continue to attempt mini-mono for neuroadaptation  Consider lens exchange vs glasses trial    (Z86.69) History of retinal detachment  Patient understands increased risk of Retinal detachment  right eye given no PVD    (H35.372) Epiretinal membrane (ERM) of left eye  Mild Epiretinal membrane left eye, secondary to prior RD      Return for as scheduled.        Luis Fernando Powers MD     Attending Physician Attestation:  Complete documentation of historical and exam elements  from today's encounter can be found in the full encounter summary report (not reduplicated in this progress note).  I personally obtained the chief complaint(s) and history of present illness.  I confirmed and edited as necessary the review of systems, past medical/surgical history, family history, social history, and examination findings as documented by others; and I examined the patient myself.  I personally reviewed the relevant tests, images, and reports as documented above.  I formulated and edited as necessary the assessment and plan and discussed the findings and management plan with the patient and family. - Luis Fernando Powers MD

## 2024-09-25 NOTE — NURSING NOTE
Chief Complaints and History of Present Illnesses   Patient presents with    Post Op (Ophthalmology) Both Eyes     Visit for post-op left eye 09/05/24 right eye 08/29/24        Chief Complaint(s) and History of Present Illness(es)       Post Op (Ophthalmology) Both Eyes              Laterality: both eyes    Associated symptoms: dryness.  Negative for eye pain, flashes, floaters and discharge    Treatments tried: eye drops    Comments: Visit for post-op left eye 09/05/24 right eye 08/29/24                 Comments    Patient reports no change to vision, with blurriness and not adjusting well.  Patient note dryness.  Patient reports needing more light for best reading.     Ocular Meds:   AT's x6 each eye     PRED FORTE 1 % once right eye and twice left eye  Ketorlac once right eye and twice left eye      No DM.   Viviana NI 10:19 AM September 25, 2024

## 2024-09-30 ENCOUNTER — ANCILLARY PROCEDURE (OUTPATIENT)
Dept: GENERAL RADIOLOGY | Facility: CLINIC | Age: 54
End: 2024-09-30
Attending: PHYSICIAN ASSISTANT
Payer: COMMERCIAL

## 2024-09-30 ENCOUNTER — OFFICE VISIT (OUTPATIENT)
Dept: URGENT CARE | Facility: URGENT CARE | Age: 54
End: 2024-09-30
Payer: COMMERCIAL

## 2024-09-30 VITALS
WEIGHT: 203 LBS | HEART RATE: 57 BPM | RESPIRATION RATE: 16 BRPM | SYSTOLIC BLOOD PRESSURE: 113 MMHG | OXYGEN SATURATION: 97 % | TEMPERATURE: 98.2 F | DIASTOLIC BLOOD PRESSURE: 74 MMHG | BODY MASS INDEX: 27.5 KG/M2 | HEIGHT: 72 IN

## 2024-09-30 DIAGNOSIS — S69.92XA WRIST INJURY, LEFT, INITIAL ENCOUNTER: ICD-10-CM

## 2024-09-30 DIAGNOSIS — T14.8XXA ABRASION: ICD-10-CM

## 2024-09-30 DIAGNOSIS — S69.92XA WRIST INJURY, LEFT, INITIAL ENCOUNTER: Primary | ICD-10-CM

## 2024-09-30 PROCEDURE — 99213 OFFICE O/P EST LOW 20 MIN: CPT | Performed by: PHYSICIAN ASSISTANT

## 2024-09-30 PROCEDURE — 73110 X-RAY EXAM OF WRIST: CPT | Mod: TC | Performed by: RADIOLOGY

## 2024-09-30 NOTE — PROGRESS NOTES
SUBJECTIVE:  Chief Complaint   Patient presents with    Wrist Injury     Injured left wrist today, swelling and pain in wrist     Urgent Care     Manfred Young is a 53 year old male presents with a chief complaint of left wrist pain, swelling, and tenderness.  The injury occurred <1 hour(s) ago.     The injury happened while at home. How: direct blow, using table saw.  The patient complained of moderate pain  and has had decreased ROM.  Pain exacerbated by twisting.  Relieved by nothing.  He treated it initially with ice. This is the first time this type of injury has occurred to this patient.     Past Medical History:   Diagnosis Date    NO ACTIVE PROBLEMS     Symptomatic cholelithiasis      Current Outpatient Medications   Medication Sig Dispense Refill    ferrous fumarate-vitamin C ER (JUSTICE-SEQUELS) 65-25 MG CR tablet Take 1 tablet by mouth every other day 45 tablet 3    ketorolac (ACULAR) 0.5 % ophthalmic solution Place 1 drop Into the left eye 4 times daily. Start 2 days prior to surgery four times a day, after surgery: Four times a day x 1 week, three times a day x 1 week, twice a day x 1 week, daily x 1 week then stop 10 mL 0    ketorolac (ACULAR) 0.5 % ophthalmic solution Place 1 drop into the right eye 4 times daily. Start 2 days prior to surgery four times a day, after surgery: Four times a day x 1 week, three times a day x 1 week, twice a day x 1 week, daily x 1 week then stop 10 mL 0    moxifloxacin (VIGAMOX) 0.5 % ophthalmic solution Place 1 drop Into the left eye 4 times daily. Start 2 days prior to surgery four times a day. After Surgery: four times a day  X 1 week 3 mL 0    moxifloxacin (VIGAMOX) 0.5 % ophthalmic solution Place 1 drop into the right eye 4 times daily. Start 2 days prior to surgery four times a day, after surgery: Four times a day x 1 week 3 mL 0    prednisoLONE acetate (PRED FORTE) 1 % ophthalmic suspension Place 1-2 drops Into the left eye 4 times daily. After surgery: Four  times a day x 1 week, three times a day x 1 week, twice a day x 1 week, daily x 1 week then stop 10 mL 0    prednisoLONE acetate (PRED FORTE) 1 % ophthalmic suspension Place 1-2 drops into the right eye 4 times daily. after surgery: Four times a day x 1 week, three times a day x 1 week, twice a day x 1 week, daily x 1 week then stop 10 mL 0    tadalafil (CIALIS) 10 MG tablet Take 1 tablet (10 mg) by mouth daily Take 1 tablet by mouth daily 90 tablet 3     Social History     Tobacco Use    Smoking status: Never    Smokeless tobacco: Never   Substance Use Topics    Alcohol use: Yes       ROS:  Review of systems negative except as stated above.    EXAM:   /74   Pulse 57   Temp 98.2  F (36.8  C) (Temporal)   Resp 16   Ht 1.829 m (6')   Wt 92.1 kg (203 lb)   SpO2 97%   BMI 27.53 kg/m    Gen: healthy,alert,no distress  Extremity: wrist has swelling and decreased ROM sith supination and pronation.   There is not compromise to the distal circulation.  Pulses are +2 and CRT is brisk  SKIN: superficial abrasion of dosrsum    X-ray image initially interpreted in clinic by me in order to rule out fracture/dislocation.  No evidence appreciated.        ASSESSMENT:   (S69.92XA) Wrist injury, left, initial encounter  (primary encounter diagnosis)  Comment: No evidence of fracture or dislocation  Plan: XR Wrist Left G/E 3 Views, Orthopedic         Referral, Wrist/Arm/Hand Bracing Supplies Order        Other (comments)          (T14.8XXA) Abrasion  Comment: cleaned , bandage with topical ab  Plan: topical AB for 4-5 days, monitor for infection    Red flags and emergent follow up discussed, and understood by patient  Follow up with PCP if symptoms worsen or fail to improve

## 2024-10-09 ENCOUNTER — OFFICE VISIT (OUTPATIENT)
Dept: OPHTHALMOLOGY | Facility: CLINIC | Age: 54
End: 2024-10-09
Attending: OPHTHALMOLOGY
Payer: COMMERCIAL

## 2024-10-09 DIAGNOSIS — Z86.69 HISTORY OF RETINAL DETACHMENT: ICD-10-CM

## 2024-10-09 DIAGNOSIS — H52.202 MYOPIA OF LEFT EYE WITH ASTIGMATISM AND PRESBYOPIA: Primary | ICD-10-CM

## 2024-10-09 DIAGNOSIS — H52.12 MYOPIA OF LEFT EYE WITH ASTIGMATISM AND PRESBYOPIA: Primary | ICD-10-CM

## 2024-10-09 DIAGNOSIS — H52.4 MYOPIA OF LEFT EYE WITH ASTIGMATISM AND PRESBYOPIA: Primary | ICD-10-CM

## 2024-10-09 DIAGNOSIS — Z96.1 PSEUDOPHAKIA, BOTH EYES: ICD-10-CM

## 2024-10-09 DIAGNOSIS — H35.372 EPIRETINAL MEMBRANE (ERM) OF LEFT EYE: ICD-10-CM

## 2024-10-09 PROCEDURE — G0463 HOSPITAL OUTPT CLINIC VISIT: HCPCS | Performed by: OPHTHALMOLOGY

## 2024-10-09 PROCEDURE — 99024 POSTOP FOLLOW-UP VISIT: CPT | Performed by: OPHTHALMOLOGY

## 2024-10-09 ASSESSMENT — CONF VISUAL FIELD
OD_INFERIOR_NASAL_RESTRICTION: 0
OD_INFERIOR_TEMPORAL_RESTRICTION: 0
OS_SUPERIOR_TEMPORAL_RESTRICTION: 0
OD_NORMAL: 1
OS_SUPERIOR_NASAL_RESTRICTION: 0
METHOD: COUNTING FINGERS
OS_INFERIOR_NASAL_RESTRICTION: 0
OD_SUPERIOR_NASAL_RESTRICTION: 0
OS_NORMAL: 1
OD_SUPERIOR_TEMPORAL_RESTRICTION: 0
OS_INFERIOR_TEMPORAL_RESTRICTION: 0

## 2024-10-09 ASSESSMENT — VISUAL ACUITY
OD_SC: 20/15
OS_PH_SC: 20/25
OS_SC: 20/40
METHOD: SNELLEN - LINEAR
OS_SC+: -1
OS_PH_SC+: -1

## 2024-10-09 ASSESSMENT — SLIT LAMP EXAM - LIDS
COMMENTS: NORMAL
COMMENTS: NORMAL

## 2024-10-09 ASSESSMENT — TONOMETRY
OS_IOP_MMHG: 9
OD_IOP_MMHG: 8
IOP_METHOD: ICARE

## 2024-10-09 ASSESSMENT — REFRACTION_MANIFEST
OS_ADD: +2.00
OD_ADD: +2.00
OS_SPHERE: -0.75
OD_CYLINDER: SPHERE
OS_CYLINDER: +0.75
OS_AXIS: 106
OD_SPHERE: PLANO

## 2024-10-09 ASSESSMENT — CUP TO DISC RATIO
OS_RATIO: 0.1
OD_RATIO: 0.1

## 2024-10-09 NOTE — NURSING NOTE
Chief Complaints and History of Present Illnesses   Patient presents with    Post Op (Ophthalmology) Both Eyes     Pseudophakia both eyes.     Chief Complaint(s) and History of Present Illness(es)       Post Op (Ophthalmology) Both Eyes              Laterality: both eyes    Associated symptoms: floaters.  Negative for dryness, eye pain, tearing, photophobia and flashes    Treatments tried: artificial tears    Pain scale: 0/10    Comments: Pseudophakia both eyes.              Comments    Pt stats vision is great in RE, not so much in the LE.  No pain.  No flashes.  No change to floaters.  AT's PRN.    KAITLIN Loving October 9, 2024 7:29 AM

## 2024-10-09 NOTE — PROGRESS NOTES
HPI       Post Op (Ophthalmology) Both Eyes    In both eyes.  Associated symptoms include floaters.  Negative for dryness, eye pain, tearing, photophobia and flashes.  Treatments tried include artificial tears.  Pain was noted as 0/10. Additional comments: Pseudophakia both eyes.             Comments    Pt stats vision is great in RE, not so much in the LE.  No pain.  No flashes.  No change to floaters.  AT's PRN.    KAITLIN Loving October 9, 2024 7:29 AM                Last edited by Susi Burrell COT on 10/9/2024  7:29 AM.           Review of systems for the eyes was negative other than the pertinent positives/negatives listed in the HPI.      Assessment & Plan    HPI:  Manfred Young is a 53 year old male with history of ED, GERD presents for final post op both eyes. Having difficulty with left eye vision.     History of Retinal detachment  with cryotherapy/scleral buckle  POHx: Retinal detachment , myopia  PMHx:   Current Medications:   Current Outpatient Medications   Medication Sig Dispense Refill    ferrous fumarate-vitamin C ER (JUSTICE-SEQUELS) 65-25 MG CR tablet Take 1 tablet by mouth every other day 45 tablet 3    tadalafil (CIALIS) 10 MG tablet Take 1 tablet (10 mg) by mouth daily Take 1 tablet by mouth daily 90 tablet 3     No current facility-administered medications for this visit.     FHx:   PSHx: SB/cryotherapy 1990s, Cataract extraction/intraocular lens Powers right eye 08/29/24, Powers left eye 09/05/24    Current Eye Medications:      Assessment & Plan:  (Z96.1) Pseudophakia, both eyes   (H52.13,  H52.203,  H52.4) Myopia of both eyes with astigmatism and presbyopia  Powers left eye 09/05/24  Powers right eye 08/29/24    Having difficulty with mini-monovision  Strongly feel that perceived decreased vision left eye due to Epiretinal membrane and prior Retinal detachment  given best corrected visual acuity 20/20 left eye vs 20/15 right eye     Continue to attempt mini-mono for  neuroadaptation  Consider lens exchange vs glasses trial vs Photorefractive keratectomy (PRK)    Patient has minimal change in myopia but a copy of today's glasses prescription was given.  The patient may wish to update the glasses if the lenses are scratched or the frames are too small.  Presbyopia is difficulty seeing up close and is treated with bifocals or over the counter reading glasses  Single visions readers also dispensed    (Z86.69) History of retinal detachment  (H35.372) Epiretinal membrane (ERM) of left eye  Mild Epiretinal membrane left eye, secondary to prior Retinal detachment  and SB      Return in about 1 year (around 10/9/2025) for Annual Visit-v/t/d/MRx.        Luis Fernando Powers MD     Attending Physician Attestation:  Complete documentation of historical and exam elements from today's encounter can be found in the full encounter summary report (not reduplicated in this progress note).  I personally obtained the chief complaint(s) and history of present illness.  I confirmed and edited as necessary the review of systems, past medical/surgical history, family history, social history, and examination findings as documented by others; and I examined the patient myself.  I personally reviewed the relevant tests, images, and reports as documented above.  I formulated and edited as necessary the assessment and plan and discussed the findings and management plan with the patient and family. - Luis Fernando Powers MD

## 2024-12-17 ENCOUNTER — VIRTUAL VISIT (OUTPATIENT)
Dept: UROLOGY | Facility: CLINIC | Age: 54
End: 2024-12-17
Payer: COMMERCIAL

## 2024-12-17 DIAGNOSIS — R35.1 NOCTURIA: Primary | ICD-10-CM

## 2024-12-17 RX ORDER — MIRABEGRON 25 MG/1
25 TABLET, FILM COATED, EXTENDED RELEASE ORAL DAILY
Qty: 30 TABLET | Refills: 1 | Status: SHIPPED | OUTPATIENT
Start: 2024-12-17

## 2024-12-17 NOTE — NURSING NOTE
Current patient location:  parked car     Is the patient currently in the state of [place]? YES    Visit mode:VIDEO    If the visit is dropped, the patient can be reconnected by:VIDEO VISIT: Text to cell phone:   Telephone Information:   Mobile [phone]       Will anyone else be joining the visit? NO  (If patient encounters technical issues they should call [phone])    Are changes needed to the allergy or medication list? No    Are refills needed on medications prescribed by this physician? NO    Rooming Documentation:  Not applicable    Reason for visit: Consult (self referred, BPH/LUTS consult. Has hx of OAB and nocuturia, interested in surgical options/Complete - ALR /)    Mattie FREEMANF

## 2024-12-17 NOTE — PROGRESS NOTES
Virtual Visit Details    Type of service:  Video Visit     Originating Location (pt. Location): Home    Distant Location (provider location):  Off-site  Platform used for Video Visit: [facility]          UROLOGY OUTPATIENT VISIT      Chief Complaint:   LUTS      Synopsis    Manfred Young is a very pleasant AGE: 54 year old year old person  He has a history of bothersome nocturia 2-3x per night, stream is very slow during daytime but its the nocturia he finds most bothersome  Has tried evening fluid restriction without relief  Stream and flow otherwise during the day are fine  One cousin with prostate cancer, uncertain about father for fam hx  Tried flomax without relief  Has hx of low blood pressure  Custom   Previous PVRs 0  Never done log to characterize fluid intake and output    PSA 1/24 1.89    Mother with OAB requiring SNS         Medications     Current Outpatient Medications   Medication Sig Dispense Refill    ferrous fumarate-vitamin C ER (JUSTICE-SEQUELS) 65-25 MG CR tablet Take 1 tablet by mouth every other day 45 tablet 3    tadalafil (CIALIS) 10 MG tablet Take 1 tablet (10 mg) by mouth daily Take 1 tablet by mouth daily 90 tablet 3     No current facility-administered medications for this visit.         The following  distinct labs were reviewed    I personally reviewed all applicable laboratory data and went over findings with patient  Significant for:    CBC RESULTS:  Recent Labs   Lab Test 01/09/24  1613 08/07/23  0304 04/19/23  1945 12/20/22  1646   WBC 5.8 4.8 6.1 5.8   HGB 11.9* 12.3* 10.0* 13.7    230 181 253        BMP RESULTS:  Recent Labs   Lab Test 01/09/24  1613 08/07/23  0304 04/19/23  1803 12/20/22  1646 04/12/21  0804 02/02/21  0747    141 136 141 145*  --    POTASSIUM 3.9 3.9 4.6 4.0 4.3  --    CHLORIDE 105 106 102 104 114*  --    CO2 29 24 23 27 28  --    ANIONGAP 7 11 11 10 3  --    GLC 94 113* 97 83 98 96   BUN 13.5 11.0 13.0 10.1 12  --    CR 0.91 0.89 0.77  0.95 0.96 0.97   GFRESTIMATED >90 >90 >90 >90 >90 >90   GFRESTBLACK  --   --   --   --  >90 >90       CALCIUM RESULTS:  Recent Labs   Lab Test 01/09/24  1613 08/07/23  0304 04/19/23  1803 12/20/22  1646   ROSENDO 9.2 9.1 9.3 9.1       HGB A1C RESULTS:  Lab Results   Component Value Date    A1C 5.2 04/12/2021       UA RESULTS:   Recent Labs   Lab Test 08/07/23  0613 04/12/21  0810 02/02/21  0756   SG 1.023 1.016 1.017   URINEPH 6.0 6.0 5.0   NITRITE Negative Negative Negative   RBCU 1 <1 1   WBCU 1 1 1       PSA RESULTS  Prostate Specific Antigen Screen   Date Value Ref Range Status   01/09/2024 1.89 0.00 - 3.50 ng/mL Final   12/20/2022 1.44 0.00 - 3.50 ng/mL Final                Assessment/Plan   54 year old year old person with hx of nocturia and LUTS, alpha blockers were ineffective  -Recommend sending 24 hour voiding diary to better characterize fluid intake and urine output  -Cystoscopy with possible TRUS next available  -Trial of mirabegron, risks/benefits reviewed      CC:  Bethany Graham

## 2024-12-17 NOTE — LETTER
12/17/2024       RE: Manfred Young  [address] [place] [id number]     Dear Colleague,    Thank you for referring your patient, Manfred Young, to the [facility] [place] at [facility]. Please see a copy of my visit note below.    Virtual Visit Details    Type of service:  Video Visit     Originating Location (pt. Location): Home    Distant Location (provider location):  Off-site  Platform used for Video Visit: [facility]          UROLOGY OUTPATIENT VISIT      Chief Complaint:   LUTS      Synopsis    Manfred Young is a very pleasant AGE: 54 year old year old person  He has a history of bothersome nocturia 2-3x per night, stream is very slow during daytime but its the nocturia he finds most bothersome  Has tried evening fluid restriction without relief  Stream and flow otherwise during the day are fine  One cousin with prostate cancer, uncertain about father for fam hx  Tried flomax without relief  Has hx of low blood pressure  Custom   Previous PVRs 0  Never done log to characterize fluid intake and output    PSA 1/24 1.89    Mother with OAB requiring SNS         Medications     Current Outpatient Medications   Medication Sig Dispense Refill     ferrous fumarate-vitamin C ER (JUSTICE-SEQUELS) 65-25 MG CR tablet Take 1 tablet by mouth every other day 45 tablet 3     tadalafil (CIALIS) 10 MG tablet Take 1 tablet (10 mg) by mouth daily Take 1 tablet by mouth daily 90 tablet 3     No current facility-administered medications for this visit.         The following  distinct labs were reviewed    I personally reviewed all applicable laboratory data and went over findings with patient  Significant for:    CBC RESULTS:  Recent Labs   Lab Test 01/09/24  1613 08/07/23  0304 04/19/23  1945 12/20/22  1646   WBC 5.8 4.8 6.1 5.8   HGB 11.9* 12.3* 10.0* 13.7    230 181 253        BMP RESULTS:  Recent Labs   Lab Test  01/09/24  1613 08/07/23  0304 04/19/23  1803 12/20/22  1646 04/12/21  0804 02/02/21  0747    141 136 141 145*  --    POTASSIUM 3.9 3.9 4.6 4.0 4.3  --    CHLORIDE 105 106 102 104 114*  --    CO2 29 24 23 27 28  --    ANIONGAP 7 11 11 10 3  --    GLC 94 113* 97 83 98 96   BUN 13.5 11.0 13.0 10.1 12  --    CR 0.91 0.89 0.77 0.95 0.96 0.97   GFRESTIMATED >90 >90 >90 >90 >90 >90   GFRESTBLACK  --   --   --   --  >90 >90       CALCIUM RESULTS:  Recent Labs   Lab Test 01/09/24  1613 08/07/23  0304 04/19/23  1803 12/20/22  1646   ROSENDO 9.2 9.1 9.3 9.1       HGB A1C RESULTS:  Lab Results   Component Value Date    A1C 5.2 04/12/2021       UA RESULTS:   Recent Labs   Lab Test 08/07/23  0613 04/12/21  0810 02/02/21  0756   SG 1.023 1.016 1.017   URINEPH 6.0 6.0 5.0   NITRITE Negative Negative Negative   RBCU 1 <1 1   WBCU 1 1 1       PSA RESULTS  Prostate Specific Antigen Screen   Date Value Ref Range Status   01/09/2024 1.89 0.00 - 3.50 ng/mL Final   12/20/2022 1.44 0.00 - 3.50 ng/mL Final                Assessment/Plan   54 year old year old person with hx of nocturia and LUTS, alpha blockers were ineffective  -Recommend sending 24 hour voiding diary to better characterize fluid intake and urine output  -Cystoscopy with possible TRUS next available  -Trial of mirabegron, risks/benefits reviewed      CC:  Bethany Graham      Again, thank you for allowing me to participate in the care of your patient.      Sincerely,    Francisco Aleman MD

## 2025-01-09 ENCOUNTER — OFFICE VISIT (OUTPATIENT)
Dept: FAMILY MEDICINE | Facility: CLINIC | Age: 55
End: 2025-01-09
Attending: PHYSICIAN ASSISTANT
Payer: COMMERCIAL

## 2025-01-09 VITALS
HEART RATE: 65 BPM | SYSTOLIC BLOOD PRESSURE: 112 MMHG | DIASTOLIC BLOOD PRESSURE: 70 MMHG | RESPIRATION RATE: 16 BRPM | HEIGHT: 73 IN | TEMPERATURE: 97.4 F | BODY MASS INDEX: 27.67 KG/M2 | OXYGEN SATURATION: 98 % | WEIGHT: 208.8 LBS

## 2025-01-09 DIAGNOSIS — Z00.00 ROUTINE GENERAL MEDICAL EXAMINATION AT A HEALTH CARE FACILITY: Primary | ICD-10-CM

## 2025-01-09 DIAGNOSIS — Z12.5 SCREENING FOR PROSTATE CANCER: ICD-10-CM

## 2025-01-09 LAB
ANION GAP SERPL CALCULATED.3IONS-SCNC: 11 MMOL/L (ref 7–15)
BUN SERPL-MCNC: 12.2 MG/DL (ref 6–20)
CALCIUM SERPL-MCNC: 9.3 MG/DL (ref 8.8–10.4)
CHLORIDE SERPL-SCNC: 103 MMOL/L (ref 98–107)
CHOLEST SERPL-MCNC: 186 MG/DL
CREAT SERPL-MCNC: 0.78 MG/DL (ref 0.67–1.17)
EGFRCR SERPLBLD CKD-EPI 2021: >90 ML/MIN/1.73M2
ERYTHROCYTE [DISTWIDTH] IN BLOOD BY AUTOMATED COUNT: 13 % (ref 10–15)
FASTING STATUS PATIENT QL REPORTED: YES
FASTING STATUS PATIENT QL REPORTED: YES
GLUCOSE SERPL-MCNC: 71 MG/DL (ref 70–99)
HCO3 SERPL-SCNC: 25 MMOL/L (ref 22–29)
HCT VFR BLD AUTO: 48.5 % (ref 40–53)
HDLC SERPL-MCNC: 41 MG/DL
HGB BLD-MCNC: 16.1 G/DL (ref 13.3–17.7)
LDLC SERPL CALC-MCNC: 96 MG/DL
MCH RBC QN AUTO: 31.9 PG (ref 26.5–33)
MCHC RBC AUTO-ENTMCNC: 33.2 G/DL (ref 31.5–36.5)
MCV RBC AUTO: 96 FL (ref 78–100)
NONHDLC SERPL-MCNC: 145 MG/DL
PLATELET # BLD AUTO: 201 10E3/UL (ref 150–450)
POTASSIUM SERPL-SCNC: 4.6 MMOL/L (ref 3.4–5.3)
PSA SERPL DL<=0.01 NG/ML-MCNC: 1.71 NG/ML (ref 0–3.5)
RBC # BLD AUTO: 5.05 10E6/UL (ref 4.4–5.9)
SODIUM SERPL-SCNC: 139 MMOL/L (ref 135–145)
TRIGL SERPL-MCNC: 247 MG/DL
WBC # BLD AUTO: 5.5 10E3/UL (ref 4–11)

## 2025-01-09 SDOH — HEALTH STABILITY: PHYSICAL HEALTH: ON AVERAGE, HOW MANY DAYS PER WEEK DO YOU ENGAGE IN MODERATE TO STRENUOUS EXERCISE (LIKE A BRISK WALK)?: 7 DAYS

## 2025-01-09 ASSESSMENT — PAIN SCALES - GENERAL: PAINLEVEL_OUTOF10: MILD PAIN (3)

## 2025-01-09 ASSESSMENT — SOCIAL DETERMINANTS OF HEALTH (SDOH): HOW OFTEN DO YOU GET TOGETHER WITH FRIENDS OR RELATIVES?: ONCE A WEEK

## 2025-01-09 NOTE — PROGRESS NOTES
"Preventive Care Visit  [facility]  Bethany Graham PA-C, Physician Assistant - Medical  Jan 9, 2025      Assessment & Plan     Routine general medical examination at a health care facility  - REVIEW OF HEALTH MAINTENANCE PROTOCOL ORDERS  - CBC with platelets; Future  - Basic metabolic panel; Future  - Lipid panel reflex to direct LDL Non-fasting; Future  - CBC with platelets  - Basic metabolic panel  - Lipid panel reflex to direct LDL Non-fasting    Screening for prostate cancer  - PROSTATE SPEC ANTIGEN SCREEN; Future  - PROSTATE SPEC ANTIGEN SCREEN    BMI  Estimated body mass index is 27.74 kg/m  as calculated from the following:    Height as of this encounter: 1.848 m (6' 0.75\").    Weight as of this encounter: 94.7 kg (208 lb 12.8 oz).     Counseling  Appropriate preventive services were addressed with this patient via screening, questionnaire, or discussion as appropriate for fall prevention, nutrition, physical activity, Tobacco-use cessation, social engagement, weight loss and cognition.  Checklist reviewing preventive services available has been given to the patient.  Reviewed patient's diet, addressing concerns and/or questions.   The patient was instructed to see the dentist every 6 months.   He is at risk for psychosocial distress and has been provided with information to reduce risk.           Radha Shearer is a 54 year old, presenting for the following:  Physical        1/9/2025     8:54 AM   Additional Questions   Roomed by Fatemeh Shearer here today for [facility] visit    No particular concerns    Working with urology for nocturia  Per his report they aren't concerned symptoms are prostate related  He did just have a cousin younger than him have TURP    Due for colon cancer screening 12/2025    HPI    Health Care Directive  Patient does not have a Health Care Directive: Discussed advance care planning with patient; information given to patient to review.      1/9/2025 "   General Health   How would you rate your overall physical health? Excellent   Feel stress (tense, anxious, or unable to sleep) To some extent   (!) STRESS CONCERN      1/9/2025   Nutrition   Three or more servings of calcium each day? Yes   Diet: Low fat/cholesterol    Vegetarian/vegan   How many servings of fruit and vegetables per day? 4 or more   How many sweetened beverages each day? 0-1       Multiple values from one day are sorted in reverse-chronological order         1/9/2025   Exercise   Days per week of moderate/strenous exercise 7 days         1/9/2025   Social Factors   Frequency of gathering with friends or relatives Once a week   Worry food won't last until get money to buy more No   Food not last or not have enough money for food? No   Do you have housing? (Housing is defined as stable permanent housing and does not include staying ouside in a car, in a tent, in an abandoned building, in an overnight shelter, or couch-surfing.) Yes   Are you worried about losing your housing? No   Lack of transportation? No   Unable to get utilities (heat,electricity)? No         1/9/2025   Fall Risk   Fallen 2 or more times in the past year? No   Trouble with walking or balance? No          1/9/2025   Dental   Dentist two times every year? (!) NO     Today's PHQ-2 Score:       1/9/2025     8:50 AM   PHQ-2 ( 1999 [employer])   Q1: Little interest or pleasure in doing things 0   Q2: Feeling down, depressed or hopeless 0   PHQ-2 Score 0    Q1: Little interest or pleasure in doing things Not at all   Q2: Feeling down, depressed or hopeless Not at all   PHQ-2 Score 0       Patient-reported         1/9/2025   Substance Use   Alcohol more than 3/day or more than 7/wk No   Do you use any other substances recreationally? (!) CANNABIS PRODUCTS     Social History     Tobacco Use    Smoking status: Never    Smokeless tobacco: Never   Vaping Use    Vaping status: Never Used   Substance Use Topics    Alcohol use: Yes    Drug use: No  "          1/9/2025   STI Screening   New sexual partner(s) since last STI/HIV test? No   ASCVD Risk   The 10-year ASCVD risk score (Veto DK, et al., 2019) is: 2.7%    Values used to calculate the score:      Age: 54 years      Sex: Male      Is Non- : No      Diabetic: No      Tobacco smoker: No      Systolic Blood Pressure: 112 mmHg      Is BP treated: No      HDL Cholesterol: 46 mg/dL      Total Cholesterol: 159 mg/dL         Reviewed and updated as needed this visit by Provider     Meds  Problems  Med Hx  Surg Hx  Fam Hx               Objective    Exam  /70 (BP Location: Right arm, Patient Position: Sitting, Cuff Size: Adult Regular)   Pulse 65   Temp 97.4  F (36.3  C) (Temporal)   Resp 16   Ht 1.848 m (6' 0.75\")   Wt 94.7 kg (208 lb 12.8 oz)   SpO2 98%   BMI 27.74 kg/m     Estimated body mass index is 27.74 kg/m  as calculated from the following:    Height as of this encounter: 1.848 m (6' 0.75\").    Weight as of this encounter: 94.7 kg (208 lb 12.8 oz).    Physical Exam  GENERAL: alert and no distress  EYES: Eyes grossly normal to inspection, PERRL and conjunctivae and sclerae normal  HENT: ear canals and TM's normal, nose and mouth without ulcers or lesions  NECK: no adenopathy, no asymmetry, masses, or scars  RESP: lungs clear to auscultation - no rales, rhonchi or wheezes  CV: regular rate and rhythm, normal S1 S2, no S3 or S4, no murmur, click or rub, no peripheral edema  ABDOMEN: soft, nontender, no hepatosplenomegaly, no masses and bowel sounds normal  MS: no gross musculoskeletal defects noted, no edema  SKIN: no suspicious lesions or rashes  NEURO: Normal strength and tone, mentation intact and speech normal  PSYCH: mentation appears normal, affect normal/bright        Signed Electronically by: Bethany Graham PA-C    "

## 2025-01-09 NOTE — PATIENT INSTRUCTIONS
Patient Education   Preventive Care Advice   This is general advice given by our system to help you stay healthy. However, your care team may have specific advice just for you. Please talk to your care team about your preventive care needs.  Nutrition  Eat 5 or more servings of fruits and vegetables each day.  Try wheat bread, brown rice and whole grain pasta (instead of white bread, rice, and pasta).  Get enough calcium and vitamin D. Check the label on foods and aim for 100% of the RDA (recommended daily allowance).  Lifestyle  Exercise at least 150 minutes each week  (30 minutes a day, 5 days a week).  Do muscle strengthening activities 2 days a week. These help control your weight and prevent disease.  No smoking.  Wear sunscreen to prevent skin cancer.  Have a dental exam and cleaning every 6 months.  Yearly exams  See your health care team every year to talk about:  Any changes in your health.  Any medicines your care team has prescribed.  Preventive care, family planning, and ways to prevent chronic diseases.  Shots (vaccines)   HPV shots (up to age 26), if you've never had them before.  Hepatitis B shots (up to age 59), if you've never had them before.  COVID-19 shot: Get this shot when it's due.  Flu shot: Get a flu shot every year.  Tetanus shot: Get a tetanus shot every 10 years.  Pneumococcal, hepatitis A, and RSV shots: Ask your care team if you need these based on your risk.  Shingles shot (for age 50 and up)  General health tests  Diabetes screening:  Starting at age 35, Get screened for diabetes at least every 3 years.  If you are younger than age 35, ask your care team if you should be screened for diabetes.  Cholesterol test: At age 39, start having a cholesterol test every 5 years, or more often if advised.  Bone density scan (DEXA): At age 50, ask your care team if you should have this scan for osteoporosis (brittle bones).  Hepatitis C: Get tested at least once in your life.  STIs (sexually  transmitted infections)  Before age 24: Ask your care team if you should be screened for STIs.  After age 24: Get screened for STIs if you're at risk. You are at risk for STIs (including HIV) if:  You are sexually active with more than one person.  You don't use condoms every time.  You or a partner was diagnosed with a sexually transmitted infection.  If you are at risk for HIV, ask about PrEP medicine to prevent HIV.  Get tested for HIV at least once in your life, whether you are at risk for HIV or not.  Cancer screening tests  Cervical cancer screening: If you have a cervix, begin getting regular cervical cancer screening tests starting at age 21.  Breast cancer scan (mammogram): If you've ever had breasts, begin having regular mammograms starting at age 40. This is a scan to check for breast cancer.  Colon cancer screening: It is important to start screening for colon cancer at age 45.  Have a colonoscopy test every 10 years (or more often if you're at risk) Or, ask your provider about stool tests like a FIT test every year or Cologuard test every 3 years.  To learn more about your testing options, visit:   .  For help making a decision, visit:   https://bit.ly/yj19123.  Prostate cancer screening test: If you have a prostate, ask your care team if a prostate cancer screening test (PSA) at age 55 is right for you.  Lung cancer screening: If you are a current or former smoker ages 50 to 80, ask your care team if ongoing lung cancer screenings are right for you.  For informational purposes only. Not to replace the advice of your health care provider. Copyright   2023 [facility]. All rights reserved. Clinically reviewed by the [facility]itions Program. [employer] [id number] - REV 01/24.  Learning About Stress  What is stress?     Stress is your body's response to a hard situation. Your body can have a physical, emotional, or mental response. Stress is a fact of life for most people, and it  affects everyone differently. What causes stress for you may not be stressful for someone else.  A lot of things can cause stress. You may feel stress when you go on a job interview, take a test, or run a race. This kind of short-term stress is normal and even useful. It can help you if you need to work hard or react quickly. For example, stress can help you finish an important job on time.  Long-term stress is caused by ongoing stressful situations or events. Examples of long-term stress include long-term health problems, ongoing problems at work, or conflicts in your family. Long-term stress can harm your health.  How does stress affect your health?  When you are stressed, your body responds as though you are in danger. It makes hormones that speed up your heart, make you breathe faster, and give you a burst of energy. This is called the fight-or-flight stress response. If the stress is over quickly, your body goes back to normal and no harm is done.  But if stress happens too often or lasts too long, it can have bad effects. Long-term stress can make you more likely to get sick, and it can make symptoms of some diseases worse. If you tense up when you are stressed, you may develop neck, shoulder, or low back pain. Stress is linked to high blood pressure and heart disease.  Stress also harms your emotional health. It can make you gilmore, tense, or depressed. Your relationships may suffer, and you may not do well at work or school.  What can you do to manage stress?  You can try these things to help manage stress:   Do something active. Exercise or activity can help reduce stress. Walking is a great way to get started. Even everyday activities such as housecleaning or yard work can help.  Try yoga or tariq chi. These techniques combine exercise and meditation. You may need some training at first to learn them.  Do something you enjoy. For example, listen to music or go to a movie. Practice your hobby or do volunteer  "work.  Meditate. This can help you relax, because you are not worrying about what happened before or what may happen in the future.  Do guided imagery. Imagine yourself in any setting that helps you feel calm. You can use online videos, books, or a teacher to guide you.  Do breathing exercises. For example:  From a standing position, bend forward from the waist with your knees slightly bent. Let your arms dangle close to the floor.  Breathe in slowly and deeply as you return to a standing position. Roll up slowly and lift your head last.  Hold your breath for just a few seconds in the standing position.  Breathe out slowly and bend forward from the waist.  Let your feelings out. Talk, laugh, cry, and express anger when you need to. Talking with supportive friends or family, a counselor, or a krish leader about your feelings is a healthy way to relieve stress. Avoid discussing your feelings with people who make you feel worse.  Write. It may help to write about things that are bothering you. This helps you find out how much stress you feel and what is causing it. When you know this, you can find better ways to cope.  What can you do to prevent stress?  You might try some of these things to help prevent stress:  Manage your time. This helps you find time to do the things you want and need to do.  Get enough sleep. Your body recovers from the stresses of the day while you are sleeping.  Get support. Your family, friends, and community can make a difference in how you experience stress.  Limit your news feed. Avoid or limit time on social media or news that may make you feel stressed.  Do something active. Exercise or activity can help reduce stress. Walking is a great way to get started.  Where can you learn more?  Go to https://www.[employer].net/patiented  Enter N032 in the search box to learn more about \"Learning About Stress.\"  Current as of: October 24, 2023  Content Version: 14.3    2024 [employer]. "   Care instructions adapted under license by your healthcare professional. If you have questions about a medical condition or this instruction, always ask your healthcare professional. [employer] disclaims any warranty or liability for your use of this information.

## 2025-01-22 RX ORDER — LIDOCAINE HYDROCHLORIDE 20 MG/ML
JELLY TOPICAL ONCE
Status: ACTIVE | OUTPATIENT
Start: 2025-02-11

## 2025-01-22 NOTE — TELEPHONE ENCOUNTER
Reason for visit: Cystoscopy - Possible TRUS     Relevant information: Last seen by Dr. Aleman on 12/17/24. Plan of care was   -Recommend sending 24 hour voiding diary to better characterize fluid intake and urine output  -Cystoscopy with possible TRUS next available  -Trial of mirabegron, risks/benefits reviewed    Records/imaging/labs/orders: IN [facility], CARE EVERYWHERE, AND PACS     At Rooming:   Gloves 7.5  Ensure AUA and AILYN questionnaires have been assigned/completed for BPH patients  standard scope set-up  have 50 mL syringe and sterile urine sample cup available in hematuria diagnoses  Consent  Record scope info in Flowsheets  Record Non-Sedation Protocols in Flowsheets     Wait until cystoscopy complete - Dr. Aleman will likely want a flow/PVR post-procedure.    In case of TRUS:   o Must set up in room 4.17  Biopsy probe plugged into ultrasound machine; sheath cover filled with ultrasound gel secured over probe with a black rubber band and needle guide (for stabilization only)   Bedside tray covered with paper sheet, 1 pack of gauze 4x4's split in two piles; one with a packet of lubricant squeezed onto it.  2 pairs large nitrile examination gloves on bedside tray  1 extra patient label to place in biopsy probe cleaning book (dirty supply room)  Consent patient  Complete Non-Sedation Protocol in Flowsheets    Belem Vallejo  1/22/2025  3:27 PM

## 2025-02-04 DIAGNOSIS — N52.9 ERECTILE DYSFUNCTION, UNSPECIFIED ERECTILE DYSFUNCTION TYPE: ICD-10-CM

## 2025-02-04 RX ORDER — TADALAFIL 10 MG/1
10 TABLET ORAL DAILY
Qty: 90 TABLET | Refills: 2 | Status: SHIPPED | OUTPATIENT
Start: 2025-02-04

## 2025-02-04 NOTE — TELEPHONE ENCOUNTER
Patient called stating that the pharmacy told him they did not receive any orders and that PCP is no longer at [facility]. Informed patient that Bethany is still here and the message will get routed to care team and PCP.

## 2025-02-11 ENCOUNTER — PRE VISIT (OUTPATIENT)
Dept: UROLOGY | Facility: CLINIC | Age: 55
End: 2025-02-11

## 2025-02-11 ENCOUNTER — OFFICE VISIT (OUTPATIENT)
Dept: UROLOGY | Facility: CLINIC | Age: 55
End: 2025-02-11
Payer: COMMERCIAL

## 2025-02-11 VITALS — DIASTOLIC BLOOD PRESSURE: 77 MMHG | SYSTOLIC BLOOD PRESSURE: 120 MMHG | OXYGEN SATURATION: 95 % | HEART RATE: 68 BPM

## 2025-02-11 DIAGNOSIS — R35.1 NOCTURIA: Primary | ICD-10-CM

## 2025-02-11 DIAGNOSIS — N40.1 BENIGN LOCALIZED PROSTATIC HYPERPLASIA WITH LOWER URINARY TRACT SYMPTOMS (LUTS): Primary | ICD-10-CM

## 2025-02-11 RX ADMIN — LIDOCAINE HYDROCHLORIDE: 20 JELLY TOPICAL at 13:33

## 2025-02-11 ASSESSMENT — PAIN SCALES - GENERAL: PAINLEVEL_OUTOF10: NO PAIN (0)

## 2025-02-11 NOTE — PATIENT INSTRUCTIONS
"AFTER YOUR CYSTOSCOPY        You have just completed a cystoscopy, or \"cysto\", which allowed your physician to learn more about your bladder (or to remove a stent placed after surgery). We suggest that you continue to avoid caffeine, fruit juice, and alcohol for the next 24 hours, however, you are encouraged to return to your normal activities.         A few things that are considered normal after your cystoscopy:     * Small amount of bleeding (or spotting) that clears within the next 24 hours     * Slight burning sensation with urination     * Sensation to of needing to avoid more frequently     * The feeling of \"air\" in your urine     * Mild discomfort that is relieved with Tylenol        Please contact our office promptly if you:     * Develop a fever above 101 degrees     * Are unable to urinate     * Develop bright red blood that does not stop     * Severe pain or swelling         Please contact our office with any concerns or questions @ [phone]   "

## 2025-02-11 NOTE — LETTER
2/11/2025       RE: Manfred Young  [address] [place] [id number]     Dear Colleague,    Thank you for referring your patient, Manfred Young, to the [facility] [place] [facility]. Please see a copy of my visit note below.    [facility] COMPREHENSIVE LOWER URINARY TRACT SYMPTOM EVALUATION    Reason for cystoscopy: Nocturia and slow stream    Brief History: 54-year-old male with history of nocturia and slow stream has tried various medications including most recently mirabegron which did not offer any relief of symptoms.  He brings in a 24-hour voiding diary showing about 8 voids in a 24-hour period.  There is no evidence of nocturnal polyuria and he does not have any evidence of excessive fluid intake or overutilzation of caffeine.    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  The 15 Irish flexible cystoscope was inserted through the urethral meatus.      The anterior urethra was:  normal without stricture.    The external sphincter was very tense and it was difficult to pass with the cystoscope  The prostatic urethra demonstrated mild bilobar hypertrophy.    The bladder neck was nonocclusive.    The bladder was  unremarkable for tumors, erythema or stones.    The ureteral orifices  were identified on each side in orthotopic position with efflux of clear urine.   There were minimal trabeculations.    On retroflexion there was the usual bladder neck hyperemia.    There was no intravesical protrusion of the prostate.      The patient tolerated the procedure well without complication.      He was then positioned in the left lateral decubitus position and a transrectal ultrasound was performed.  We first performed a digital rectal exam revealing a medium size prostate without nodules.  We then  inserted the lubricated transrectal ultrasound probe and measured prostate at around 37 g    EVALUATION AND MANAGEMENT   Upon completion of the cystoscopy the patient was brought to a separate examination room to discuss findings of above testing, review available treatment options and make a plan for further steps in care     SUMMARY: 54-year-old male with mixed irritative and obstructive lower urinary tract symptoms  -We discussed some of the potential etiologies of his symptoms.  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  I would recommend he meet with a pelvic floor physical therapist to consider a course of biofeedback and pelvic floor relaxation.  If he is not getting any treatment relief from this we discussed surgical treatment options.  He has potential interested in Rezum thermal therapy and I believe this would be a suitable treatment based on the anatomy of his prostate      15minutes spent on the date of the encounter, including direct interaction with the patient, performing chart review, documentation and further activities as noted above, exclusive of the time spent performing  cystoscopy    I, Francisco Aleman saw and evaluated this patient and agree with the plan as stated above.  I personally performed all listed procedures.      Again, thank you for allowing me to participate in the care of your patient.      Sincerely,    Francisco Aleman MD

## 2025-02-11 NOTE — NURSING NOTE
Chief Complaint   Patient presents with    Cystoscopy with TRUS     He denies any recent infections, fevers or urinary tract infection symptoms.        Pre-Post procedure education was provided to the patient. The patient verbalized understanding. AVS and patient instructions printed for patient.   Blood pressure 120/77, pulse 68, SpO2 95%. There is no height or weight on file to calculate BMI.    Patient Active Problem List   Diagnosis    CARDIOVASCULAR SCREENING; LDL GOAL LESS THAN 160    Transplant donor evaluation    Postsurgical retinal scar    Nephrolithiasis    Nocturia    S/P cholecystectomy    Iron deficiency anemia secondary to inadequate dietary iron intake    Acute pain of right knee    Combined forms of age-related cataract of both eyes    Acute pain of right shoulder       Allergies   Allergen Reactions    Bee Venom Swelling     Gets hot    Chlorhexidine Gluconate Itching     Has a sensitivity    Penicillins Diarrhea    Wasp Venom Protein Other (See Comments)     High fever after being stung by bee.       Current Outpatient Medications   Medication Sig Dispense Refill    ferrous fumarate-vitamin C ER (JUSTICE-SEQUELS) 65-25 MG CR tablet Take 1 tablet by mouth every other day 45 tablet 3    tadalafil (CIALIS) 10 MG tablet Take 1 tablet (10 mg) by mouth daily. Take 1 tablet by mouth daily 90 tablet 2       Social History     Tobacco Use    Smoking status: Never    Smokeless tobacco: Never   Vaping Use    Vaping status: Never Used   Substance Use Topics    Alcohol use: Yes    Drug use: No       Invasive Procedure Safety Checklist:    Procedure: Cystoscopy with TRUS     Action: Complete sections and checkboxes as appropriate.    Pre-procedure:  1. Patient ID Verified with 2 identifiers (Paulina and  or MRN) : YES    2. Procedure and site verified with patient/designee (when able) : YES    3. Accurate consent documentation in medical record : YES    4. H&P (or appropriate assessment) documented in medical  record : N/A  H&P must be up to 30 days prior to procedure an updated within 24 hours of                 Procedure as applicable.     5. Relevant diagnostic and radiology test results appropriately labeled and displayed as applicable : YES    6. Blood products, implants, devices, and/or special equipment available for the procedure as applicable : YES    7. Procedure site(s) marked with provider initials [Exclusions: none] : NO    8. Marking not required. Reason : Yes  Procedure does not require site marking    Time Out:     Time-Out performed immediately prior to starting procedure, including verbal and active participation of all team members addressing: YES    1. Correct patient identity.  2. Confirmed that the correct side and site are marked.  3. An accurate procedure to be done.  4. Agreement on the procedure to be done.  5. Correct patient position.  6. Relevant images and results are properly labeled and appropriately displayed.  7. The need to administer antibiotics or fluids for irrigation purposes during the procedure as applicable.  8. Safety precautions based on patient history or medication use.    During Procedure: Verification of correct person, site, and procedure occurs any time the responsibility for care of the patient is transferred to another member of the care team.    The following medication was given:     MEDICATION:  Lidocaine without epinephrine 2% jelly  ROUTE: urethral   SITE: urethral   DOSE: 10 mL  LOT #: [id number]  : [employer]  EXPIRATION DATE: 8-26  NDC#: [address]-0  Was there drug waste? No    Prior to med admin, verified patient identity using patient's name and date of birth.  Due to med administration, patient instructed to remain in clinic for 15 minutes  afterwards, and to report any adverse reaction to me immediately.    Drug Amount Wasted:  None.  Vial/Syringe: Syringe    Transrectal ultrasound was performed. Pre and post procedure  education provided to the patient. The patient verbalized understanding and consented to this procedure.     Invasive Procedure Safety Checklist:    Action: Complete sections and checkboxes as appropriate.    Pre-procedure:  1. Patient ID Verified with 2 identifiers (Paulina and  or MRN) : YES    2. Procedure and site verified with patient/designee (when able) : YES    3. Accurate consent documentation in medical record : YES    4. H&P (or appropriate assessment) documented in medical record : N/A  H&P must be up to 30 days prior to procedure an updated within 24 hours of                 Procedure as applicable.     5. Relevant diagnostic and radiology test results appropriately labeled and displayed as applicable : YES    6. Blood products, implants, devices, and/or special equipment available for the procedure as applicable : YES    7. Procedure site(s) marked with provider initials [Exclusions: none] : NO    8. Marking not required. Reason : Yes  Procedure does not require site marking    Time Out:     Time-Out performed immediately prior to starting procedure, including verbal and active participation of all team members addressing: YES    1. Correct patient identity.  2. Confirmed that the correct side and site are marked.  3. An accurate procedure to be done.  4. Agreement on the procedure to be done.  5. Correct patient position.  6. Relevant images and results are properly labeled and appropriately displayed.  7. The need to administer antibiotics or fluids for irrigation purposes during the procedure as applicable.  8. Safety precautions based on patient history or medication use.    During Procedure: Verification of correct person, site, and procedure occurs any time the responsibility for care of the patient is transferred to another member of the care team.    Belem Vallejo  2025  12:51 PM

## 2025-02-13 NOTE — PROGRESS NOTES
[facility] COMPREHENSIVE LOWER URINARY TRACT SYMPTOM EVALUATION    Reason for cystoscopy: Nocturia and slow stream    Brief History: 54-year-old male with history of nocturia and slow stream has tried various medications including most recently mirabegron which did not offer any relief of symptoms.  He brings in a 24-hour voiding diary showing about 8 voids in a 24-hour period.  There is no evidence of nocturnal polyuria and he does not have any evidence of excessive fluid intake or overutilzation of caffeine.    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  The 15 [place] flexible cystoscope was inserted through the urethral meatus.      The anterior urethra was:  normal without stricture.    The external sphincter was very tense and it was difficult to pass with the cystoscope  The prostatic urethra demonstrated mild bilobar hypertrophy.    The bladder neck was nonocclusive.    The bladder was  unremarkable for tumors, erythema or stones.    The ureteral orifices  were identified on each side in orthotopic position with efflux of clear urine.   There were minimal trabeculations.    On retroflexion there was the usual bladder neck hyperemia.    There was no intravesical protrusion of the prostate.      The patient tolerated the procedure well without complication.      He was then positioned in the left lateral decubitus position and a transrectal ultrasound was performed.  We first performed a digital rectal exam revealing a medium size prostate without nodules.  We then inserted the lubricated transrectal ultrasound probe and measured prostate at around 37 g    EVALUATION AND MANAGEMENT   Upon completion of the cystoscopy the patient was brought to a separate examination room to discuss findings of above testing, review available treatment options and make a plan for further steps in  care     SUMMARY: 54-year-old male with mixed irritative and obstructive lower urinary tract symptoms  -We discussed some of the potential etiologies of his symptoms.  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  I would recommend he meet with a pelvic floor physical therapist to consider a course of biofeedback and pelvic floor relaxation.  If he is not getting any treatment relief from this we discussed surgical treatment options.  He has potential interested in Rezum thermal therapy and I believe this would be a suitable treatment based on the anatomy of his prostate      15minutes spent on the date of the encounter, including direct interaction with the patient, performing chart review, documentation and further activities as noted above, exclusive of the time spent performing  cystoscopy    I, Francisco Aleman saw and evaluated this patient and agree with the plan as stated above.  I personally performed all listed procedures.

## 2025-04-03 ENCOUNTER — THERAPY VISIT (OUTPATIENT)
Dept: PHYSICAL THERAPY | Facility: CLINIC | Age: 55
End: 2025-04-03
Attending: UROLOGY
Payer: COMMERCIAL

## 2025-04-03 DIAGNOSIS — N40.1 BENIGN LOCALIZED PROSTATIC HYPERPLASIA WITH LOWER URINARY TRACT SYMPTOMS (LUTS): ICD-10-CM

## 2025-04-03 NOTE — PROGRESS NOTES
PHYSICAL THERAPY EVALUATION  Type of Visit: Evaluation    Subjective  - Pt notes that his symptoms of difficulty emptying at night started in the last few years, trialed a few of the meds and did not help at all. Pt has tried adjusting fluid intake but does not seem to make any difference.         Presenting condition or subjective complaint: nocturia  Date of onset: 02/11/25    Relevant medical history: Migraines or headaches   Dates & types of surgery: cataracts 09/24    Prior diagnostic imaging/testing results: Other ultrasound and scope   Prior therapy history for the same diagnosis, illness or injury: No      Living Environment  Social support: With a significant other or spouse   Type of home: House   Stairs to enter the home: Yes 5 Is there a railing: Yes     Ramp: No   Stairs inside the home: Yes 13 Is there a railing: Yes     Help at home: None  Equipment owned:       Employment: Yes   Hobbies/Interests: biking,swimming,guitar playing/building    Patient goals for therapy: sleep through the night     Objective      PELVIC EVALUATION  ADDITIONAL HISTORY:  Sex assigned at birth: Male  Gender identity: Male    Pronouns: He/Him/His      Bladder History: Pt notes that he feels that he fully empties. Pt notes a lot of pushing with urination at the beginning and the end of urination. Daytime is totally normal. Pt notes that it takes a minute or 2 to urinate during the night. Pt notes daytime frequency every 2-3 hours. Pt usually wakes up 12:30 and 3.   Feels bladder filling: Yes  Triggers for feeling of inability to wait to go to the bathroom: No    How long can you wait to urinate: 1 hour  Gets up at night to urinate: Yes 1-2  Can stop the flow of urine when urinating: Yes  Volume of urine usually released: Medium   Other issues: Straining to pass urine; Slow or hesitant urine stream  Number of bladder infections in last 12 months:    Fluid intake per day: 60-80oz   12-24oz  - 80 oz water, 12-24  alcohol.   Medications taken for bladder: No     Activities causing urine leak:      Amount of urine typically leaked:    Pads used to help with leaking:          Bowel History: Pt denies straining with bowel movements. Pt has some urgency with bowel movements. Feels like he empties fully.   Frequency of bowel movement: 2-3x daily  Consistency of stool: Soft    Ignores the urge to defecate: No  Other bowel issues:    Length of time spent trying to have a bowel movement:      Sexual Function History:  Sexual orientation: Straight    Sexually active: Yes  Lubrication used: No No  Pelvic pain:      Pain or difficulty with orgasms/erection/ejaculation: No    State of menopause:    Hormone medications: No      Do you get regular exercise: Yes  I do this type of exercise: hike,bike swim +physically active work: Summer patient bikes daily, sometimes mile sometimes 10 miles - Bike is not professional fit. Mostly road some trail.   Have you tried pelvic floor strengthening exercises for 4 weeks: No   Do you have any history of trauma that is relevant to your care that you d like to share: No      Discussed reason for referral regarding pelvic health needs and external/internal pelvic floor muscle examination with patient/guardian.  Opportunity provided to ask questions and verbal consent for assessment and intervention was given.    POSTURE: Standing Posture: Rounded shoulders, Forward head, Thoracic kyphosis increased  LUMBAR SCREEN: AROM WFL  HIP SCREEN: reduced hamstring and adductor flexibility bilat   Strength:   Pain: - none + mild ++ moderate +++ severe  Strength Scale: 0-5/5 Left Right   Hip Flexion 5 5   Hip Abduction 5 5   Hip Internal Rotation 5 5   Hip External Rotation 5- 5-      Functional Strength Testing: Double Leg Squat: Hip ER, improper use of glutes   SLS: + hip drop     PELVIC/SI SCREEN: Reduced hip IR bilat    ABDOMINAL ASSESSMENT  Diastasis Rectus Abdominis (ASUNCION):   At Umbilicus Depth/Finger width:  1.5    Abdominal Activation/Strength:  good TA with cues, discussed role in pressure management     Scar:   Location/Type: gall bladder sx  Mobility: Hypomobile    BIOFEEDBACK:  Position: Supine, Sit  Surface Electrodes: Perineal    Perianals:   Supine Baseline:    Seated Baseline:    Seated Quick Flicks:    Seated Endurance:    Final Seated Baseline:        Assessment & Plan   CLINICAL IMPRESSIONS  Medical Diagnosis: Benign localized prostatic hyperplasia with lower urinary tract symptoms (LUTS)    Treatment Diagnosis: Benign localized prostatic hyperplasia with lower urinary tract symptoms (LUTS)   Impression/Assessment: Patient is a 54 year old male with pelvic floor dysfunction complaints.  The following significant findings have been identified: Decreased ROM/flexibility, Decreased proprioception, Impaired muscle performance, Decreased activity tolerance, and Impaired posture. These impairments interfere with their ability to perform self care tasks as compared to previous level of function.     Clinical Decision Making (Complexity):  Clinical Presentation: Stable/Uncomplicated  Clinical Presentation Rationale: based on medical and personal factors listed in PT evaluation  Clinical Decision Making (Complexity): Low complexity    PLAN OF CARE  Treatment Interventions:  Modalities: Biofeedback  Interventions: Manual Therapy, Neuromuscular Re-education, Therapeutic Activity, Therapeutic Exercise, Self-Care/Home Management    Long Term Goals     PT Goal 1  Goal Identifier: Nocturia  Goal Description: Pt will only void 1x per night for more restorative sleep.  Rationale: to maximize safety and independence with self cares  Target Date: 07/01/25      Frequency of Treatment: 1x per week for 4 weeks, 2x per month for 2 months  Duration of Treatment: 12 weeks    Education Assessment:   Learner/Method: Patient;No Barriers to Learning    Risks and benefits of evaluation/treatment have been explained.    Patient/Family/caregiver agrees with Plan of Care.     Evaluation Time:     PT Lauryn, Low Complexity Minutes (44950): 25     Signing Clinician: Rosangela Odonnell PT

## 2025-04-09 NOTE — PROGRESS NOTES
[facility] mail order pharmacy is requesting a prescription to be sent for 90day supply    Preoperative Evaluation  [facility]  [address]  [address]  [place] [address]  Phone: [phone]  Fax: [phone]  Primary Provider: Bethany Graham PA-C  Pre-op Performing Provider: Bethany Graham PA-C  Aug 21, 2024             8/21/2024   Surgical Information   What procedure is being done? cataract   Facility or Hospital where procedure/surgery will be performed:  [facility]    Who is doing the procedure / surgery? Luis Fernando Powers   Date of surgery / procedure: 08/29/24 & 09/05/24   Time of surgery / procedure: 8:05 AM    Where do you plan to recover after surgery? at home with family        Fax number for surgical facility: Note does not need to be faxed, will be available electronically in Epic.    Assessment & Plan     The proposed surgical procedure is considered LOW risk.    Preop general physical exam  Combined forms of age-related cataract of both eyes    Acute pain of right shoulder - pt referral provided  - Physical Therapy  Referral     - No identified additional risk factors other than previously addressed    Preoperative Medication Instructions  Antiplatelet or Anticoagulation Medication Instructions   - Patient is on no antiplatelet or anticoagulation medications.    Additional Medication Instructions  Take all scheduled medications on the day of surgery    Recommendation  Approval given to proceed with proposed procedure, without further diagnostic evaluation.    Radha Shearer is a 53 year old, presenting for the following:  Pre-Op Exam    HPI related to upcoming procedure: bilateral cataracts    Also has right shoulder pain, bothering him at night (sleeps on right side) and has been not improving through present for a few months  No ROM limitation, just pain with movement        8/21/2024   Pre-Op Questionnaire   Have you ever had a heart attack or stroke? No   Have you ever had surgery on your  heart or blood vessels, such as a stent placement, a coronary artery bypass, or surgery on an artery in your head, neck, heart, or legs? No   Do you have chest pain with activity? No   Do you have a history of heart failure? No   Do you currently have a cold, bronchitis or symptoms of other infection? No   Do you have a cough, shortness of breath, or wheezing? No   Do you or anyone in your family have previous history of blood clots? No   Do you or does anyone in your family have a serious bleeding problem such as prolonged bleeding following surgeries or cuts? No   Have you ever had problems with anemia or been told to take iron pills? No   Have you had any abnormal blood loss such as black, tarry or bloody stools? No   Have you ever had a blood transfusion? No   Are you willing to have a blood transfusion if it is medically needed before, during, or after your surgery? Yes   Have you or any of your relatives ever had problems with anesthesia? No   Do you have sleep apnea, excessive snoring or daytime drowsiness? No   Do you have any artifical heart valves or other implanted medical devices like a pacemaker, defibrillator, or continuous glucose monitor? No   Do you have artificial joints? No   Are you allergic to latex? No        Health Care Directive  Patient does not have a Health Care Directive or Living Will:     Preoperative Review of    reviewed - no record of controlled substances prescribed.      Patient Active Problem List    Diagnosis Date Noted    Combined forms of age-related cataract of both eyes 06/19/2024     Priority: Medium    Acute pain of right knee 03/11/2024     Priority: Medium    Iron deficiency anemia secondary to inadequate dietary iron intake 01/10/2024     Priority: Medium    S/P cholecystectomy 01/09/2024     Priority: Medium    Nocturia 12/20/2022     Priority: Medium    Nephrolithiasis 09/03/2021     Priority: Medium    Transplant donor evaluation 03/24/2021     Priority: Medium     Postsurgical retinal scar 04/05/2011     Priority: Medium    CARDIOVASCULAR SCREENING; LDL GOAL LESS THAN 160 10/31/2010     Priority: Medium      Past Medical History:   Diagnosis Date    NO ACTIVE PROBLEMS     Symptomatic cholelithiasis      Past Surgical History:   Procedure Laterality Date    LAPAROSCOPIC CHOLECYSTECTOMY N/A 08/07/2023    Procedure: Laparoscopic cholecystectomy;  Surgeon: Cody Whitmore MD;  Location: [facility] OR    RETINAL REATTACHMENT      SURGICAL HISTORY OF -       detached retina    SURGICAL HISTORY OF -       arthroscopic knee surgery-Right x1, left x1, plica repair     Current Outpatient Medications   Medication Sig Dispense Refill    ferrous fumarate-vitamin C ER (JUSTICE-SEQUELS) 65-25 MG CR tablet Take 1 tablet by mouth every other day 45 tablet 3    tadalafil (CIALIS) 10 MG tablet Take 1 tablet (10 mg) by mouth daily Take 1 tablet by mouth daily 90 tablet 3    famotidine (PEPCID) 20 MG tablet Take 1 tablet (20 mg) by mouth 2 times daily 120 tablet 0       Allergies   Allergen Reactions    Bee Venom Swelling     Gets hot    Chlorhexidine Gluconate Itching     Has a sensitivity    Penicillins Diarrhea    Wasp Venom Protein Other (See Comments)     High fever after being stung by bee.        Social History     Tobacco Use    Smoking status: Never    Smokeless tobacco: Never   Substance Use Topics    Alcohol use: Yes       History   Drug Use No             Objective    /66 (BP Location: Right arm, Patient Position: Sitting, Cuff Size: Adult Regular)   Pulse 56   Temp 97.7  F (36.5  C) (Temporal)   Resp 18   Ht 1.829 m (6')   Wt 90.7 kg (199 lb 14.4 oz)   SpO2 96%   BMI 27.11 kg/m     Estimated body mass index is 27.11 kg/m  as calculated from the following:    Height as of this encounter: 1.829 m (6').    Weight as of this encounter: 90.7 kg (199 lb 14.4 oz).  Physical Exam  GENERAL: alert and no distress  EYES: Eyes grossly normal to inspection, PERRL and conjunctivae and  sclerae normal  HENT: ear canals and TM's normal, nose and mouth without ulcers or lesions  NECK: no adenopathy, no asymmetry, masses, or scars  RESP: lungs clear to auscultation - no rales, rhonchi or wheezes  CV: regular rate and rhythm, normal S1 S2, no S3 or S4, no murmur, click or rub, no peripheral edema  MS: no gross musculoskeletal defects noted, no edema  SKIN: no suspicious lesions or rashes  NEURO: Normal strength and tone, mentation intact and speech normal  PSYCH: mentation appears normal, affect normal/bright    Recent Labs   Lab Test 01/09/24  1613   HGB 11.9*         POTASSIUM 3.9   CR 0.91        Diagnostics  No labs were ordered during this visit.   No EKG required for low risk surgery (cataract, skin procedure, breast biopsy, etc).    Revised Cardiac Risk Index (RCRI)  The patient has the following serious cardiovascular risks for perioperative complications:   - No serious cardiac risks = 0 points     RCRI Interpretation: 0 points: Class I (very low risk - 0.4% complication rate)         Signed Electronically by: Bethany Graham PA-C  A copy of this evaluation report is provided to the requesting physician.

## 2025-04-14 ENCOUNTER — TELEPHONE (OUTPATIENT)
Dept: OPHTHALMOLOGY | Facility: CLINIC | Age: 55
End: 2025-04-14

## 2025-04-14 ENCOUNTER — OFFICE VISIT (OUTPATIENT)
Dept: OPHTHALMOLOGY | Facility: CLINIC | Age: 55
End: 2025-04-14
Attending: OPHTHALMOLOGY
Payer: COMMERCIAL

## 2025-04-14 DIAGNOSIS — H33.311 RETINAL TEAR, RIGHT: Primary | ICD-10-CM

## 2025-04-14 DIAGNOSIS — H35.372 EPIRETINAL MEMBRANE (ERM) OF LEFT EYE: ICD-10-CM

## 2025-04-14 PROCEDURE — 92014 COMPRE OPH EXAM EST PT 1/>: CPT | Performed by: OPHTHALMOLOGY

## 2025-04-14 PROCEDURE — 92134 CPTRZ OPH DX IMG PST SGM RTA: CPT | Performed by: OPHTHALMOLOGY

## 2025-04-14 ASSESSMENT — TONOMETRY
IOP_METHOD: ICARE
OD_IOP_MMHG: 10
OS_IOP_MMHG: 9

## 2025-04-14 ASSESSMENT — SLIT LAMP EXAM - LIDS
COMMENTS: NORMAL
COMMENTS: NORMAL

## 2025-04-14 ASSESSMENT — CONF VISUAL FIELD
OD_SUPERIOR_NASAL_RESTRICTION: 0
OD_INFERIOR_NASAL_RESTRICTION: 0
OS_INFERIOR_TEMPORAL_RESTRICTION: 0
OS_NORMAL: 1
METHOD: COUNTING FINGERS
OS_INFERIOR_NASAL_RESTRICTION: 0
OS_SUPERIOR_NASAL_RESTRICTION: 0
OS_SUPERIOR_TEMPORAL_RESTRICTION: 0
OD_INFERIOR_TEMPORAL_RESTRICTION: 0
OD_NORMAL: 1
OD_SUPERIOR_TEMPORAL_RESTRICTION: 0

## 2025-04-14 ASSESSMENT — VISUAL ACUITY
OS_SC: 20/30
OD_SC: 20/20
OS_SC+: -2
METHOD: SNELLEN - LINEAR

## 2025-04-14 ASSESSMENT — CUP TO DISC RATIO
OD_RATIO: 0.1
OS_RATIO: 0.1

## 2025-04-14 NOTE — TELEPHONE ENCOUNTER
[phone]     [facility] w/ direct number.    Mariela Lindquist RN on 4/14/2025 at 8:37 AM

## 2025-04-14 NOTE — TELEPHONE ENCOUNTER
Pt offered appt in [place] or [place] with Dr. Powers. Pt wants to stay with Dr. Powers and accepts appt in [place] at 1430 today.     Location verified.     Mariela Lindquist RN on 4/14/2025 at 8:48 AM

## 2025-04-14 NOTE — PROGRESS NOTES
HPI       Floaters Right Eye    In right eye.             Comments    Patient here today due to increased floaters, right eye, over the weekend. Pt denies flashing lights. States it looks like he is looking through dots. Small in size, clear with black dot in the middle. Vision seems to be mostly the same. Does not that when he blinks there is a cloudy area along upper portion of right eye vision. Denies ocular pain. No drop use currently.           Last edited by Jacqueline Diehl COT on 4/14/2025  2:09 PM.           Review of systems for the eyes was negative other than the pertinent positives/negatives listed in the HPI.      Assessment & Plan    HPI:  Manfred Young is a 54 year old male with history of ED, GERD presents for new floaters right eye x 1 day. Denies flashes    History of Retinal detachment  with cryotherapy/scleral buckle  POHx: Retinal detachment , myopia  PMHx:   Current Medications:   Current Outpatient Medications   Medication Sig Dispense Refill    ferrous fumarate-vitamin C ER (JUSTICE-SEQUELS) 65-25 MG CR tablet Take 1 tablet by mouth every other day 45 tablet 3    tadalafil (CIALIS) 10 MG tablet Take 1 tablet (10 mg) by mouth daily. Take 1 tablet by mouth daily 90 tablet 2     No current facility-administered medications for this visit.     FHx:   PSHx: SB/cryotherapy 1990s, Cataract extraction/intraocular lens Powers right eye 08/29/24, Powers left eye 09/05/24    Current Eye Medications:      Assessment & Plan:  (H33.311) Retinal tear, right  (primary encounter diagnosis)  X 2  Send to retina for laser retinopexy vs repeat cryotherapy vs PPV  Tears overlying prior cryo     (Z96.1) Pseudophakia, both eyes   (H52.13,  H52.203,  H52.4) Myopia of both eyes with astigmatism and presbyopia  Powers left eye 09/05/24  Powers right eye 08/29/24    Doing better with mini-monovision  Strongly feel that perceived decreased vision left eye due to Epiretinal membrane and prior Retinal  detachment  given best corrected visual acuity 20/20 left eye vs 20/15 right eye     (Z86.69) History of retinal detachment  (H35.372) Epiretinal membrane (ERM) of left eye  Mild Epiretinal membrane left eye, secondary to prior Retinal detachment  and SB      Return for tomorrow 1pm DDK v/t/d.        Luis Fernando Powers MD     Attending Physician Attestation:  Complete documentation of historical and exam elements from today's encounter can be found in the full encounter summary report (not reduplicated in this progress note).  I personally obtained the chief complaint(s) and history of present illness.  I confirmed and edited as necessary the review of systems, past medical/surgical history, family history, social history, and examination findings as documented by others; and I examined the patient myself.  I personally reviewed the relevant tests, images, and reports as documented above.  I formulated and edited as necessary the assessment and plan and discussed the findings and management plan with the patient and family. - Luis Fernando Powers MD

## 2025-04-14 NOTE — TELEPHONE ENCOUNTER
" [facility] Call Center    Phone Message    May a detailed message be left on voicemail: yes     Reason for Call: Symptoms or Concerns     If patient has red-flag symptoms, warm transfer to triage line    Current symptom or concern: Pt states that over the weekend he has developed a ton of new floaters in the Rt eye. He also states that when he looks into certain light, he sees what looks like a \"perforated filter\" (a lot of tiny dots).    Symptoms have been present for:  2-3 day(s)    Has patient previously been seen for this? No    By : Dr. Powers    Date: 10/9/24    Are there any new or worsening symptoms? Yes: New floaters/dots in Rt eye.    Action Taken: Message routed to:  [facility] E[facility]    Travel Screening: Not Applicable     Date of Service:                                                                      "

## 2025-04-14 NOTE — NURSING NOTE
Chief Complaints and History of Present Illnesses   Patient presents with    Floaters Right Eye       Chief Complaint(s) and History of Present Illness(es)       Floaters Right Eye              Laterality: right eye              Comments    Patient here today due to increased floaters, right eye, over the weekend. Pt denies flashing lights. States it looks like he is looking through dots. Small in size, clear with black dot in the middle. Vision seems to be mostly the same. Does not that when he blinks there is a cloudy area along upper portion of right eye vision. Denies ocular pain. No drop use currently.                    Jacqueline Diehl, COT

## 2025-04-15 ENCOUNTER — HOSPITAL ENCOUNTER (OUTPATIENT)
Facility: AMBULATORY SURGERY CENTER | Age: 55
End: 2025-04-15
Attending: OPHTHALMOLOGY | Admitting: OPHTHALMOLOGY
Payer: COMMERCIAL

## 2025-04-15 ENCOUNTER — TELEPHONE (OUTPATIENT)
Dept: OPHTHALMOLOGY | Facility: CLINIC | Age: 55
End: 2025-04-15

## 2025-04-15 ENCOUNTER — OFFICE VISIT (OUTPATIENT)
Dept: OPHTHALMOLOGY | Facility: CLINIC | Age: 55
End: 2025-04-15
Attending: OPHTHALMOLOGY
Payer: COMMERCIAL

## 2025-04-15 DIAGNOSIS — H33.21 RIGHT RETINAL DETACHMENT: Primary | ICD-10-CM

## 2025-04-15 DIAGNOSIS — H33.21 RIGHT RETINAL DETACHMENT: ICD-10-CM

## 2025-04-15 PROCEDURE — G0463 HOSPITAL OUTPT CLINIC VISIT: HCPCS | Performed by: OPHTHALMOLOGY

## 2025-04-15 ASSESSMENT — VISUAL ACUITY
OD_SC: 20/20
OS_PH_SC+: +2
OS_SC: 20/40
METHOD: SNELLEN - LINEAR
OS_PH_SC: 20/30
OS_SC+: -3

## 2025-04-15 ASSESSMENT — TONOMETRY
IOP_METHOD: ICARE
OS_IOP_MMHG: 9
OD_IOP_MMHG: 10

## 2025-04-15 ASSESSMENT — SLIT LAMP EXAM - LIDS
COMMENTS: NORMAL
COMMENTS: NORMAL

## 2025-04-15 ASSESSMENT — CUP TO DISC RATIO
OS_RATIO: 0.1
OD_RATIO: 0.1

## 2025-04-15 NOTE — NURSING NOTE
"Chief Complaints and History of Present Illnesses   Patient presents with    Retinal Evaluation     Chief Complaint(s) and History of Present Illness(es)       Retinal Evaluation               Comments    Patient was seen yesterday by Dr. Powers. Patient states vision in RE is getting worse, \"dark cloud coming down\". Lots of flashes of light and floaters. No pain BE. Some dryness and irritation.     Ocular Meds: Artificial tears as needed    Rosa NI 1:01 PM April 15, 2025                      "

## 2025-04-15 NOTE — PROGRESS NOTES
Pre-Operative H & P     CC:  Preoperative exam to assess for increased cardiopulmonary risk while undergoing surgery and anesthesia.    Date of Encounter: 4/15/2025  Primary Care Physician:  Bethany Graham     Reason for visit:   Encounter Diagnosis   Name Primary?    Right retinal detachment Yes       HPI  Manfred Young is a 54 year old male who presents for pre-operative H & P in preparation for vitrectomy with Dr. Duarte on 4/16/25 at [facility]. History is obtained from the patient and chart review    Hx of abnormal bleeding or anti-platelet use: none    Menstrual history: No LMP for male patient.:     Prior to Admission Medications  Current Outpatient Medications   Medication Sig Dispense Refill    ferrous fumarate-vitamin C ER (JUSTICE-SEQUELS) 65-25 MG CR tablet Take 1 tablet by mouth every other day 45 tablet 3    tadalafil (CIALIS) 10 MG tablet Take 1 tablet (10 mg) by mouth daily. Take 1 tablet by mouth daily 90 tablet 2       Family History  Family History   Problem Relation Age of Onset    Osteoporosis Mother     Lipids Father     Macular Degeneration Maternal Grandmother     Glaucoma Maternal Grandmother     Cerebrovascular Disease Maternal Grandmother     Prostate Cancer Cousin        The complete review of systems is negative other than noted in the HPI or here.     Preprocedure Note            No Anesthesia note exists            /77  SpO2: 97%  P: 58  T: 97.6     Physical Exam  Constitutional: Awake, alert, cooperative, no apparent distress, and appears stated age.  Eyes: Pupils equal, round and reactive to light, extra ocular muscles intact, sclera clear, conjunctiva normal.  HENT: Normocephalic, oral pharynx with moist mucus membranes, good dentition. No goiter appreciated.   Respiratory: Clear to auscultation bilaterally, no crackles or wheezing.  Cardiovascular: Regular rate and rhythm, normal S1 and S2, and no murmur noted.  No edema. Palpable pulses to radial    GI:  soft, non-distended,  non-tender, no masses palpated, no hepatosplenomegaly.  Surgical scars: abdominal scar from lap gabriel; knee scars from scope  Lymph/Hematologic: No cervical lymphadenopathy and no supraclavicular lymphadenopathy.  Skin: Warm and dry.  No rashes at anticipated surgical site.   Musculoskeletal: Full ROM of neck. There is no redness, warmth, or swelling of the joints. Gross motor strength is normal.    Neurologic: Awake, alert, oriented to name, place and time. Cranial nerves II-XII are grossly intact. Gait is normal.   Neuropsychiatric: Calm, cooperative. Normal affect.   PRIOR LABS/DIAGNOSTIC STUDIES:   All labs and imaging personally reviewed   EKG/ stress test - if available please see in ROS above   No results found.       No data to display              The patient's records and results personally reviewed by this provider.     Outside records reviewed from: care everywhere    LAB/DIAGNOSTIC STUDIES TODAY:  none    ASSESSMENT and PLAN    Plan/Recommendations  Pt will be optimized for the proposed procedure.  See below for details on the assessment, risk, and preoperative recommendations     Eye:  - Past ocular history: retinal detachment right eye; scleral buckle left eye    NEUROLOGY  - No h/o CVA, TIA  - Post Op delirium risk factors:  Age     ENT  - No current airway concerns.  Will need to be reassessed day of surgery.  Mallampati: Anesthesia will assess  TM: Anesthesia will assess     CARDIAC  - No h/o CAD/NSTEMI, CHF  - Does not have HLD and has not been on low-dose statin by choice  Patient performs 10 or more METS     CHF  Ischemic heart disease  CVA or TIA  DM requiring insulin  Cr >2  Intraperitoneal, intrathoracic, or vascular surgery above the inguinal ligament    0= 0.4%  1= 0.9%  2= 6.6%  3+ 11%  Revised Cardiac Risk Index: 0.4% risk of major adverse cardiac event.      PULMONARY  Snore  Tired  Observed apnea  HTN  BMI >30  Age >50  Neck >40 cm   Male    1-2 Low  3-4 Intermediate  5-8 High  If =/> 3  and HCO3 =/> 28, treat as high risk  FELY risk: Minimal Risk     Total Score: 1      - Denies asthma or inhaler use  - Tobacco History    Social History     Socioeconomic History    Marital status:      Spouse name: None    Number of children: None    Years of education: None    Highest education level: None   Occupational History    Occupation: Build furniture     Employer: SELF   Tobacco Use    Smoking status: Never    Smokeless tobacco: Never   Vaping Use    Vaping status: Never Used   Substance and Sexual Activity    Alcohol use: Yes    Drug use: No    Sexual activity: Yes     Partners: Female     Social Drivers of Health     Financial Resource Strain: Low Risk  (1/9/2025)    Financial Resource Strain     Within the past 12 months, have you or your family members you live with been unable to get utilities (heat, electricity) when it was really needed?: No   Food Insecurity: Low Risk  (1/9/2025)    Food Insecurity     Within the past 12 months, did you worry that your food would run out before you got money to buy more?: No     Within the past 12 months, did the food you bought just not last and you didn t have money to get more?: No   Transportation Needs: Low Risk  (1/9/2025)    Transportation Needs     Within the past 12 months, has lack of transportation kept you from medical appointments, getting your medicines, non-medical meetings or appointments, work, or from getting things that you need?: No   Physical Activity: Unknown (1/9/2025)    Exercise Vital Sign     Days of Exercise per Week: 7 days   Stress: Stress Concern Present (1/9/2025)    [employer] - Occupational Stress Questionnaire     Feeling of Stress : To some extent   Social Connections: Unknown (1/9/2025)    Social Connection and Isolation Panel [NHANES]     Frequency of Social Gatherings with Friends and Family: Once a week   Interpersonal Safety: Low Risk  (4/3/2025)    Interpersonal Safety     Do you feel  physically and emotionally safe where you currently live?: Yes     Within the past 12 months, have you been hit, slapped, kicked or otherwise physically hurt by someone?: No     Within the past 12 months, have you been humiliated or emotionally abused in other ways by your partner or ex-partner?: No   Housing Stability: Low Risk  (1/9/2025)    Housing Stability     Do you have housing? : Yes     Are you worried about losing your housing?: No           GI  Denies GERD  Hx of PONV  Pre-op opiate use  PONV risk score= 1.  (If > 2, anti-emetic intervention is recommended.)  PONV Medium Risk  Total Score: 2    1 AN PONV: Patient is not a current smoker    1 AN PONV: Intended Post Op Opioids      /RENAL  - No CKD     ENDOCRINE    There is no height or weight on file to calculate BMI.  - No history of Diabetes Mellitus or thyroid abnormalities  - Does have history of kidney stones     HEME  - No history of abnormal bleeding or antiplatelet use or anticoagulant use.    Age =/>60 1 point  BMI =/> 40  1 point  Male  2 points    Sepsis/SIRS 3 points  Hx of VTE  3 points  FH of VTE 4 points  Active cancer  5 points    0= 0.26%  1-2= 0.5%  3-5= 1.8%  6-8= 3%  >8= 4.5%  VTE risk: 0.5%       MSK  - no issues        Jacob Tillman MD  Vitreoretinal Surgery Fellow

## 2025-04-15 NOTE — PROGRESS NOTES
"CC -   RD OD    INTERVAL HISTORY - Initial visit with me, seen by Gio 4/2025  Patient was seen yesterday by Dr. Powers. Patient states vision in RE is getting worse, \"dark cloud coming down\" along the bottom right of his right eye. Lots of flashes of light and floaters.     PMH -   Manfred Young is a 54 year old patient with history of HST OD and RD OS with ERM OS      PAST OCULAR SURGERY  Cryo OD  SBP/cryo OS  CE/IOL OU 9/2024 (Gio)    RETINAL IMAGING:  OCT 04/14/25   OD - macula normal, PVD  OS - mild ERM non-central, no sig foveal distortion, PVD        ASSESSMENT & PLAN    # RRD OD macula on   - HST with SRF noted 4/14/25   - SRF and symptoms significant increase 4/15/25 too much SRF for pexy      - advise repair in 1-2 days   - patient ate today lunchtime, would be very late if attempted today     - d/w patient PPV vs PPV/SBP vs SBP   - patient does not want refractive change, would prefer PPV   - not candidate for pneumatic given separation of tears and extensive pathology       - no high altitude travel, no blood thinners, no GLP-1   - able to tolerate MAC likely     - r/b/a d/w patient: vision loss, blindness, infection, bleeding   - retinal detachment, need for more surgeries, need for gas or oil bubble and bubble restrictions   - diplopia, refractive change   - persistent blurriness, distortion, or scotoma   - participation/performance by fellow or resident     - surgery planned as add on tomorrow with Jessy or me        # ERM OS  - BCVA OS 20/20 on 10/2024  - ERM non-central, minimal foveal distortion      # H/o RD OS s/p SBP and cryo      # h/o HST OD s/p cryo      # PVD OU    POD #1        ATTESTATION     Attending Attestation:     Complete documentation of historical and exam elements from today's encounter can be found in the full encounter summary report (not reduplicated in this progress note).  I personally obtained the chief complaint(s) and history of present illness.  I " confirmed and edited as necessary the review of systems, past medical/surgical history, family history, social history, and examination findings as documented by others; and I examined the patient myself.  I personally reviewed the relevant tests, images, and reports as documented above.  I formulated and edited as necessary the assessment and plan and discussed the findings and management plan with the patient and family    Waleska Duarte MD, PhD  , Vitreoretinal Surgery  Department of Ophthalmology  [facility]

## 2025-04-16 ENCOUNTER — ANESTHESIA EVENT (OUTPATIENT)
Dept: SURGERY | Facility: CLINIC | Age: 55
End: 2025-04-16
Payer: COMMERCIAL

## 2025-04-16 ENCOUNTER — HOSPITAL ENCOUNTER (OUTPATIENT)
Facility: CLINIC | Age: 55
End: 2025-04-16
Attending: OPHTHALMOLOGY | Admitting: OPHTHALMOLOGY
Payer: COMMERCIAL

## 2025-04-16 ENCOUNTER — HOSPITAL ENCOUNTER (OUTPATIENT)
Facility: CLINIC | Age: 55
Discharge: HOME | End: 2025-04-16
Attending: OPHTHALMOLOGY | Admitting: OPHTHALMOLOGY
Payer: COMMERCIAL

## 2025-04-16 ENCOUNTER — ANESTHESIA (OUTPATIENT)
Dept: SURGERY | Facility: CLINIC | Age: 55
End: 2025-04-16
Payer: COMMERCIAL

## 2025-04-16 ENCOUNTER — TELEPHONE (OUTPATIENT)
Dept: OPHTHALMOLOGY | Facility: CLINIC | Age: 55
End: 2025-04-16
Payer: COMMERCIAL

## 2025-04-16 VITALS
WEIGHT: 204.81 LBS | HEIGHT: 73 IN | SYSTOLIC BLOOD PRESSURE: 117 MMHG | BODY MASS INDEX: 27.14 KG/M2 | TEMPERATURE: 98.3 F | HEART RATE: 97 BPM | OXYGEN SATURATION: 98 % | RESPIRATION RATE: 20 BRPM | DIASTOLIC BLOOD PRESSURE: 86 MMHG

## 2025-04-16 DIAGNOSIS — H33.21 RIGHT RETINAL DETACHMENT: ICD-10-CM

## 2025-04-16 PROCEDURE — 999N000141 HC STATISTIC PRE-PROCEDURE NURSING ASSESSMENT: Performed by: OPHTHALMOLOGY

## 2025-04-16 PROCEDURE — 250N000009 HC RX 250: Performed by: STUDENT IN AN ORGANIZED HEALTH CARE EDUCATION/TRAINING PROGRAM

## 2025-04-16 RX ORDER — PROPARACAINE HYDROCHLORIDE 5 MG/ML
1 SOLUTION/ DROPS OPHTHALMIC ONCE
Status: COMPLETED | OUTPATIENT
Start: 2025-04-16 | End: 2025-04-16

## 2025-04-16 RX ORDER — CYCLOPENTOLAT/TROPIC/PHENYLEPH 1%-1%-2.5%
1 DROPS (EA) OPHTHALMIC (EYE)
Status: COMPLETED | OUTPATIENT
Start: 2025-04-16 | End: 2025-04-16

## 2025-04-16 RX ADMIN — Medication 1 DROP: at 16:36

## 2025-04-16 RX ADMIN — PROPARACAINE HYDROCHLORIDE 1 DROP: 5 SOLUTION/ DROPS OPHTHALMIC at 16:35

## 2025-04-16 RX ADMIN — Medication 1 DROP: at 16:48

## 2025-04-16 RX ADMIN — Medication 1 DROP: at 16:43

## 2025-04-16 ASSESSMENT — ACTIVITIES OF DAILY LIVING (ADL)
ADLS_ACUITY_SCORE: 41

## 2025-04-16 NOTE — TELEPHONE ENCOUNTER
Patient contacted me around 7:30 PM on 4/15/2025.  He was seen earlier today in retina clinic and noted to have a superonasal and superotemporal retinal tear with associated retinal detachment in the periphery, not involving the macula.  He states that since his appointment, he feels that the peripheral scotoma has become more noticeable, likely bigger, but still far from the center of his vision.  He was wondering if there is anything he could do to mitigate progression or if he needed to go to surgery earlier.    I discussed his symptoms with the retina fellow on-call.  In speaking with Manfred, I explained that he can potentially slow progression of retinal detachment to involving the center by limiting eye movement to prevent fluid waves and by lying flat to prevent gravity from extending the detachment centrally.  I also explained that if he does notice blurred vision nearing the center, he should call back as we can assess the possibility of going to surgery earlier than tomorrow afternoon, such as overnight tonight.  He expressed understanding and appreciation and would like to monitor with bilateral eye patching and lying down.    I emphasized that I will be on-call night and would welcome any callbacks with further questions or concerns.  He is scheduled for surgery as a first add-on case in the afternoon on 4/16/2025 with Dr. Jiménez.    Parker Campuzano MD  Resident Physician, PGY-3  Department of Ophthalmology

## 2025-04-16 NOTE — ANESTHESIA PREPROCEDURE EVALUATION
Anesthesia Pre-Procedure Evaluation    Patient: Manfred Young   MRN: [id number] : 1970        Procedure : Procedure(s):  RIGHT EYE VITRECTOMY, PARS PLANA APPROACH, USING 25-GAUGE INSTRUMENTS, GAS VS OIL, POSSIBLE SCLERAL BUCKLING          Past Medical History:   Diagnosis Date     NO ACTIVE PROBLEMS      Symptomatic cholelithiasis       Past Surgical History:   Procedure Laterality Date     LAPAROSCOPIC CHOLECYSTECTOMY N/A 2023    Procedure: Laparoscopic cholecystectomy;  Surgeon: Cody Whitmore MD;  Location: [facility] OR     PHACOEMULSIFICATION CLEAR CORNEA WITH TORIC INTRAOCULAR LENS IMPLANT Right 2024    Procedure: RIGHT EYE PHACOEMULSIFICATION, CATARACT, WITH INTRAOCULAR LENS IMPLANT, TORIC LENS;  Surgeon: Luis Fernando Powers MD;  Location: [facility] OR     PHACOEMULSIFICATION CLEAR CORNEA WITH TORIC INTRAOCULAR LENS IMPLANT Left 2024    Procedure: LEFT EYE PHACOEMULSIFICATION, CATARACT, WITH INTRAOCULAR LENS IMPLANT, TORIC LENS;  Surgeon: Luis Fernando Powesr MD;  Location: [facility] OR     RETINAL REATTACHMENT       SURGICAL HISTORY OF -       detached retina     SURGICAL HISTORY OF -       arthroscopic knee surgery-Right x1, left x1, plica repair      Allergies   Allergen Reactions     Bee Venom Swelling     Gets hot     Chlorhexidine Gluconate Itching     Has a sensitivity     Penicillins Diarrhea     Wasp Venom Protein Other (See Comments)     High fever after being stung by bee.      Social History     Tobacco Use     Smoking status: Never     Smokeless tobacco: Never   Substance Use Topics     Alcohol use: Yes      Wt Readings from Last 1 Encounters:   25 92.9 kg (204 lb 12.9 oz)        Anesthesia Evaluation            ROS/MED HX  ENT/Pulmonary:       Neurologic: Comment: Retinal detachment      Cardiovascular:     (+) Dyslipidemia - -   -  - -                                      METS/Exercise Tolerance:     Hematologic:     (+)      anemia,          Musculoskeletal:      "  GI/Hepatic:       Renal/Genitourinary:     (+)       Nephrolithiasis , BPH,      Endo:       Psychiatric/Substance Use:       Infectious Disease:       Malignancy:       Other:             OUTSIDE LABS:  CBC:   Lab Results   Component Value Date    WBC 5.5 01/09/2025    WBC 5.8 01/09/2024    HGB 16.1 01/09/2025    HGB 11.9 (L) 01/09/2024    HCT 48.5 01/09/2025    HCT 40.4 01/09/2024     01/09/2025     01/09/2024     BMP:   Lab Results   Component Value Date     01/09/2025     01/09/2024    POTASSIUM 4.6 01/09/2025    POTASSIUM 3.9 01/09/2024    CHLORIDE 103 01/09/2025    CHLORIDE 105 01/09/2024    CO2 25 01/09/2025    CO2 29 01/09/2024    BUN 12.2 01/09/2025    BUN 13.5 01/09/2024    CR 0.78 01/09/2025    CR 0.91 01/09/2024    GLC 71 01/09/2025    GLC 94 01/09/2024     COAGS:   Lab Results   Component Value Date    PTT 27 04/12/2021    INR 1.04 04/12/2021     POC: No results found for: \"BGM\", \"HCG\", \"HCGS\"  HEPATIC:   Lab Results   Component Value Date    ALBUMIN 4.3 08/07/2023    PROTTOTAL 6.4 08/07/2023    ALT 19 08/07/2023    AST 17 08/07/2023    ALKPHOS 56 08/07/2023    BILITOTAL 0.3 08/07/2023     OTHER:   Lab Results   Component Value Date    A1C 5.2 04/12/2021    ROSENDO 9.3 01/09/2025    PHOS 3.0 04/12/2021    LIPASE 48 08/07/2023              Yesenia Caceres MD    Clinically Significant Risk Factors Present on Admission                             # Overweight: Estimated body mass index is 27.02 kg/m  as calculated from the following:    Height as of this encounter: 1.854 m (6' 1\").    Weight as of this encounter: 92.9 kg (204 lb 12.9 oz).                "

## 2025-04-16 NOTE — PROGRESS NOTES
Pts case was cancelled here d/t no available OR. Dr. Kathy Tillman with Ophthalmology called pt and directed him to go to [facility] for immediate surgery. Dr. Tillman also instructed RN to give dilating gtts, which were completed. Dr. Tillman wished for pt to keep his IV, but that is against policy. IV was removed and pt left with his partner to go to [facility].

## 2025-04-16 NOTE — TELEPHONE ENCOUNTER
Reached out to patient. Per Karissa from UR control desk, surgery will not happen until 1600 or later as Dr. Jiménez is in clinic. Writer advised pt to remain NPO until surgery. Writer also advised that pt would get a call from wrier or the OR directly to let him know once a confirmed surgery time has been made. Pt acknowledged understanding. Avril Fraser on 4/16/2025 at 11:12 AM

## 2025-04-16 NOTE — TELEPHONE ENCOUNTER
Phone call and voicemail from patient wanting to know what time is his surgery and where will patient surgery be .      Abad Duncan on 4/16/2025 at 10:48 AM

## 2025-04-17 ENCOUNTER — OFFICE VISIT (OUTPATIENT)
Dept: OPHTHALMOLOGY | Facility: CLINIC | Age: 55
End: 2025-04-17
Attending: OPHTHALMOLOGY
Payer: COMMERCIAL

## 2025-04-17 DIAGNOSIS — Z48.810 AFTERCARE FOLLOWING SURGERY OF A SENSORY ORGAN: Primary | ICD-10-CM

## 2025-04-17 PROCEDURE — G0463 HOSPITAL OUTPT CLINIC VISIT: HCPCS | Performed by: OPHTHALMOLOGY

## 2025-04-17 RX ORDER — PREDNISOLONE ACETATE 10 MG/ML
1 SUSPENSION/ DROPS OPHTHALMIC
Status: ON HOLD | COMMUNITY
Start: 2024-09-05

## 2025-04-17 RX ORDER — KETOROLAC TROMETHAMINE 5 MG/ML
SOLUTION OPHTHALMIC
Status: ON HOLD | COMMUNITY
Start: 2024-09-05

## 2025-04-17 RX ORDER — MOXIFLOXACIN 5 MG/ML
SOLUTION/ DROPS OPHTHALMIC
Status: ON HOLD | COMMUNITY
Start: 2024-09-05

## 2025-04-17 ASSESSMENT — ACTIVITIES OF DAILY LIVING (ADL)
ADLS_ACUITY_SCORE: 41

## 2025-04-17 ASSESSMENT — TONOMETRY
IOP_METHOD: TONOPEN
OD_IOP_MMHG: 15
OS_IOP_MMHG: 11

## 2025-04-17 ASSESSMENT — VISUAL ACUITY
METHOD: SNELLEN - LINEAR
OS_SC: 20/30
OD_SC: HM
OS_SC+: -2

## 2025-04-17 ASSESSMENT — SLIT LAMP EXAM - LIDS
COMMENTS: NORMAL
COMMENTS: NORMAL

## 2025-04-17 ASSESSMENT — CUP TO DISC RATIO
OS_RATIO: 0.1
OD_RATIO: 0.1

## 2025-04-17 NOTE — PROGRESS NOTES
"CC -  status post  RD repair right eye   Initially referred by Dr. Powers 4/2025  Patient was seen yesterday by Dr. Powers. Patient states vision in RE is getting worse, \"dark cloud coming down\" along the bottom right of his right eye. Lots of flashes of light and floaters.     [facility] -   Manfred Young is a 54 year old patient with history of HST OD and RD OS with ERM OS      PAST OCULAR SURGERY  Cryo OD  SBP/cryo OS  CE/IOL OU 9/2024 (Gio)    RETINAL IMAGING:  OCT 04/14/25   OD - macula normal, PVD  OS - mild ERM non-central, no sig foveal distortion, PVD        ASSESSMENT & PLAN  # Postoperative day 1 status post 25 g Pars plana vitrectomy (PPV)/endolaser/air fluid exchange/ C3F8 14% right eye 04/16/25 at [facility]  For   # RRD OD macula on   - HST with SRF noted 4/14/25   - SRF and symptoms significant increase 4/15/25 too much SRF for pexy    Slept well; some eye discomfort  Retina attached  Doing well    Plan:  Position: Not on back; Ok to sleep R or L side down; straight during the day  No aviation  No heavy lifting   Chairez shield at all times    Medications to operative eye  Predforte   (pink top) four times a day    Ofloxacin (tan top) four times a day         What to watch out for:  If you experience any of the following \"RSVP Symptoms\", you should call immediately:  Worsening Redness  Worsening Sensitivity to light  Worsening Vision, including new flashing lights or floaters  Worsening Pain, including nausea/vomiting      # ERM OS  - BCVA OS 20/20 on 10/2024  - ERM non-central, minimal foveal distortion    # H/o RD OS s/p SBP and cryotherapy- in [place]- Dr. Breezy Graves 1991  # h/o HST OD s/p cryotherapy- 1991  No history of prematurity  # history of PVD OU    POW1  ~~~~~~~~~~~~~~~~~~~~~~~~~~~~~~~~~~   Complete documentation of historical and exam elements from today's encounter can be found in the full encounter summary report (not reduplicated in this progress note).  I personally obtained the chief " complaint(s) and history of present illness.  I confirmed and edited as necessary the review of systems, past medical/surgical history, family history, social history, and examination findings as documented by others; and I examined the patient myself.  I personally reviewed the relevant tests, images, and reports as documented above.  I formulated and edited as necessary the assessment and plan and discussed the findings and management plan with the patient and family    Debi Jiménez MD  Professor of Ophthalmology  Vitreo-Retinal surgeon   Department of Ophthalmology and Visual Neurosciences   [facility]  Phone: [phone]   Fax: [phone]

## 2025-04-17 NOTE — PATIENT INSTRUCTIONS
"  Plan:  Position: Not on back; Ok to sleep R or L side down; straight during the day  No aviation  No heavy lifting   Chairez shield at all times    Medications to operative eye  Predforte   (pink top) four times a day    Ofloxacin (tan top) four times a day       What to watch out for:  If you experience any of the following \"RSVP Symptoms\", you should call immediately:  Worsening Redness  Worsening Sensitivity to light  Worsening Vision, including new flashing lights or floaters  Worsening Pain, including nausea/vomiting  "

## 2025-04-18 ASSESSMENT — ACTIVITIES OF DAILY LIVING (ADL)
ADLS_ACUITY_SCORE: 41

## 2025-04-19 ASSESSMENT — ACTIVITIES OF DAILY LIVING (ADL)
ADLS_ACUITY_SCORE: 41

## 2025-04-20 ASSESSMENT — ACTIVITIES OF DAILY LIVING (ADL)
ADLS_ACUITY_SCORE: 41

## 2025-04-24 ENCOUNTER — OFFICE VISIT (OUTPATIENT)
Dept: OPHTHALMOLOGY | Facility: CLINIC | Age: 55
End: 2025-04-24
Attending: OPHTHALMOLOGY
Payer: COMMERCIAL

## 2025-04-24 DIAGNOSIS — Z48.810 AFTERCARE FOLLOWING SURGERY OF A SENSORY ORGAN: Primary | ICD-10-CM

## 2025-04-24 PROCEDURE — G0463 HOSPITAL OUTPT CLINIC VISIT: HCPCS | Performed by: OPHTHALMOLOGY

## 2025-04-24 ASSESSMENT — VISUAL ACUITY
OD_SC: CF@2FT
OS_SC: 20/25
OS_SC+: +2
METHOD: SNELLEN - LINEAR

## 2025-04-24 ASSESSMENT — SLIT LAMP EXAM - LIDS
COMMENTS: NORMAL
COMMENTS: NORMAL

## 2025-04-24 ASSESSMENT — TONOMETRY
OD_IOP_MMHG: 11
OS_IOP_MMHG: 07
IOP_METHOD: TONOPEN

## 2025-04-24 ASSESSMENT — CUP TO DISC RATIO
OS_RATIO: 0.1
OD_RATIO: 0.1

## 2025-04-24 ASSESSMENT — REFRACTION_WEARINGRX
OS_ADD: +2.50
OD_AXIS: 077
OS_AXIS: 098
SPECS_TYPE: PAL
OD_SPHERE: -5.50
OS_SPHERE: -8.00
OS_CYLINDER: +2.75
OD_CYLINDER: +1.50
OD_ADD: +2.50

## 2025-04-24 NOTE — PATIENT INSTRUCTIONS
"  Position: Not on back; Ok to sleep R or L side down; straight during the day  No aviation  No heavy lifting   Chairez shield at all times    Medications to operative eye  Predforte   (pink top) Three times a day x 1 week, then twice a day x 1 week and then once a day till finish   Ofloxacin (tan top) stop       What to watch out for:  If you experience any of the following \"RSVP Symptoms\", you should call immediately:  Worsening Redness  Worsening Sensitivity to light  Worsening Vision, including new flashing lights or floaters  Worsening Pain, including nausea/vomiting  " Reason for Admission:   Per Dr. Reece Bryant H&P: \"Pt  Chief Complaint is Headache, chest pain. Past medical history significant for hypertension and cocaine abuse. The patient reports symptom for a long time, 6 months. The patient reports that he has not been taking his antihypertensive medications for many months, symptom progressively worse. He comes today because he had left-sided numbness in the arm and right-sided chest pain. The patient reports persistent symptom without any alleviating factor. \"                   RRAT Score:      4               Plan for utilizing home health:   TBD - CM to f/u with pt for Initial Assessment and meet with the Treatment Team to assist with D/C planning. Current Advanced Directive/Advance Care Plan: No Advanced Care Directive Documents are recorded in pt chart. Pt is a Full Code Status. Likelihood of Readmission:  Moderate based on pt medical condition, Cocaine use, self-pay noted in chart, and compliance with f/u care with a PCP. Transition of Care Plan:     reviewed pt chart. Pt reported to  ([facility], [address] 5/10) that he has [facility] but he did not have his insurance card with him. The pt facesheet documents self-pay. Will refer pt to [facility] if Medicaid coverage is not confirmed. Pt may need a medication voucher if no insurance is confirmed and prescriptions are ordered at d/c. Pt was admitted on Inpatient Status. No ED or IP admissions for past 6 months recorded in chart. Due to cocaine abuse hx,  will provide pt with resources related to drug abuse services. Pt reported to [facility] that he does not have a PCP. His facesheet documented a PCP as Dr. Natan Ragland ([phone]). No PCP notes were found in chart.  [facility] scheduled follow-up appointment with [address] [place] [address] on 5/16/19 at 11am.  will meet with pt to complete an initial assessment and f/u with the treatment team to assist with d/c planning.

## 2025-04-24 NOTE — PROGRESS NOTES
"CC -  status post  RD repair right eye   Initially referred by Dr. Powers 4/2025  Patient was seen yesterday by Dr. Powers. Patient states vision in RE is getting worse, \"dark cloud coming down\" along the bottom right of his right eye. Lots of flashes of light and floaters.     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  For   # RRD OD macula on   - HST with SRF noted 4/14/25   - SRF and symptoms significant increase 4/15/25 too much SRF for pexy    Slept well; some eye discomfort  Retina attached  Doing well    Plan:  Position: Not on back; Ok to sleep R or L side down; straight during the day  No aviation  No heavy lifting   Chairez shield at all times    Medications to operative eye  Predforte   (pink top) Three times a day x 1 week, then twice a day x 1 week and then once a day till finish   Ofloxacin (tan top) stop       What to watch out for:  If you experience any of the following \"RSVP Symptoms\", you should call immediately:  Worsening Redness  Worsening Sensitivity to light  Worsening Vision, including new flashing lights or floaters  Worsening Pain, including nausea/vomiting      # ERM OS  - BCVA OS 20/20 on 10/2024  - ERM non-central, minimal foveal distortion    # H/o RD OS s/p SBP and cryotherapy- in [place]- Dr. Breezy Graves 1991  # h/o HST OD s/p cryotherapy- 1991  No history of prematurity  # history of PVD OU    ~~~~~~~~~~~~~~~~~~~~~~~~~~~~~~~~~~   Complete documentation of historical and exam elements from today's encounter can be found in the full encounter summary " report (not reduplicated in this progress note).  I personally obtained the chief complaint(s) and history of present illness.  I confirmed and edited as necessary the review of systems, past medical/surgical history, family history, social history, and examination findings as documented by others; and I examined the patient myself.  I personally reviewed the relevant tests, images, and reports as documented above.  I formulated and edited as necessary the assessment and plan and discussed the findings and management plan with the patient and family    Debi Jiménez MD  Professor of Ophthalmology  Vitreo-Retinal surgeon   Department of Ophthalmology and Visual Neurosciences   [facility]  Phone: [phone]   Fax: [phone]

## 2025-04-24 NOTE — NURSING NOTE
Chief Complaints and History of Present Illnesses   Patient presents with    Post Op (Ophthalmology) Right Eye     POW#1 s/p 25 g Pars plana vitrectomy (PPV)/endolaser/air fluid exchange/ C3F8 14% right eye 04/16/25 at [facility]     Chief Complaint(s) and History of Present Illness(es)       Post Op (Ophthalmology) Right Eye              Laterality: right eye    Comments: POW#1 s/p 25 g Pars plana vitrectomy (PPV)/endolaser/air fluid exchange/ C3F8 14% right eye 04/16/25 at [facility]              Comments    Pt states that he can count his fingers now, but vision is still very blurry. No eye pain today. No new flashes or floaters.  No DM.    NIRAV Oswald April 24, 2025 12:42 PM

## 2025-05-06 ENCOUNTER — TELEPHONE (OUTPATIENT)
Dept: FAMILY MEDICINE | Facility: CLINIC | Age: 55
End: 2025-05-06
Payer: COMMERCIAL

## 2025-05-06 DIAGNOSIS — K21.9 GASTROESOPHAGEAL REFLUX DISEASE, UNSPECIFIED WHETHER ESOPHAGITIS PRESENT: Primary | ICD-10-CM

## 2025-05-06 RX ORDER — FAMOTIDINE 20 MG/1
20 TABLET, FILM COATED ORAL 2 TIMES DAILY
Qty: 180 TABLET | Refills: 1 | Status: SHIPPED | OUTPATIENT
Start: 2025-05-06

## 2025-05-06 NOTE — TELEPHONE ENCOUNTER
Pt was seen in 1/2025 by JADA Faria .  Pt said Bethany mentioned a med for reflux. Not omeprazole.  He is now having sx.   Can he get this med that was mentioned?  Pharmacy pended.    OK to leave a detailed message.  DAMIAN Cain

## 2025-05-06 NOTE — TELEPHONE ENCOUNTER
Gave pt this message.  Ant, Triage RN  [facility]/ [phone]

## 2025-05-07 ENCOUNTER — TELEPHONE (OUTPATIENT)
Dept: OPHTHALMOLOGY | Facility: CLINIC | Age: 55
End: 2025-05-07
Payer: COMMERCIAL

## 2025-05-07 NOTE — TELEPHONE ENCOUNTER
Patient notes he see tiny air bubbles and very tiny floaters.  Patient assumed it was part of the gas bubble dispersing.  Patient will call if he develops flashes and block of vision or a curtain in his vision.  The patient expresses understanding.

## 2025-05-07 NOTE — TELEPHONE ENCOUNTER
[facility] Call Center    Phone Message    May a detailed message be left on voicemail: yes     Reason for Call: Symptoms or Concerns     If patient has red-flag symptoms, warm transfer to triage line    Current symptom or concern: Floaters/tiny air bubbles in right eye.     Symptoms have been present for:  couple day(s)    Has patient previously been seen for this? No    By NA    Date: NA    Are there any new or worsening symptoms? Yes: Patient had surgery back on 4/16 and has noticed tiny air bubbles/floaters in the right eye. He would like to further discuss with care team. Please call to advise.     Action Taken: Other: eye    Travel Screening: Not Applicable

## 2025-05-14 ENCOUNTER — OFFICE VISIT (OUTPATIENT)
Dept: OPHTHALMOLOGY | Facility: CLINIC | Age: 55
End: 2025-05-14
Attending: OPHTHALMOLOGY
Payer: COMMERCIAL

## 2025-05-14 DIAGNOSIS — H33.311 RETINAL TEAR, RIGHT: Primary | ICD-10-CM

## 2025-05-14 DIAGNOSIS — H35.372 EPIRETINAL MEMBRANE (ERM) OF LEFT EYE: ICD-10-CM

## 2025-05-14 PROCEDURE — G0463 HOSPITAL OUTPT CLINIC VISIT: HCPCS | Performed by: OPHTHALMOLOGY

## 2025-05-14 PROCEDURE — 92134 CPTRZ OPH DX IMG PST SGM RTA: CPT | Performed by: OPHTHALMOLOGY

## 2025-05-14 PROCEDURE — 92250 FUNDUS PHOTOGRAPHY W/I&R: CPT | Performed by: OPHTHALMOLOGY

## 2025-05-14 ASSESSMENT — VISUAL ACUITY
METHOD: SNELLEN - LINEAR
OS_PH_SC+: +1
OD_SC: 20/20
OD_SC+: -1
OS_SC: 20/40
OS_PH_SC: 20/30
OS_SC+: +1

## 2025-05-14 ASSESSMENT — CUP TO DISC RATIO
OD_RATIO: 0.1
OS_RATIO: 0.1

## 2025-05-14 ASSESSMENT — TONOMETRY
IOP_METHOD: ICARE
OD_IOP_MMHG: 11
OS_IOP_MMHG: 9

## 2025-05-14 ASSESSMENT — SLIT LAMP EXAM - LIDS
COMMENTS: NORMAL
COMMENTS: NORMAL

## 2025-05-14 NOTE — PATIENT INSTRUCTIONS
"Position: Not on back; Ok to sleep R or L side down; straight during the day  No aviation  No heavy lifting   Chairez shield at all times    Medications to operative eye  Predforte   (pink top) increase to four times a day  right eye   Start ketorolac (gray top) four times a day  right eye   Ofloxacin (tan top) stop       What to watch out for:  If you experience any of the following \"RSVP Symptoms\", you should call immediately:  Worsening Redness  Worsening Sensitivity to light  Worsening Vision, including new flashing lights or floaters  Worsening Pain, including nausea/vomiting  "

## 2025-05-14 NOTE — NURSING NOTE
"Chief Complaints and History of Present Illnesses   Patient presents with    Post Op (Ophthalmology) Right Eye     POM1 (Right) RIGHT EYE VITRECTOMY, PARS PLANA APPROACH, USING 25-GAUGE INSTRUMENTS, GAS VS OIL, POSSIBLE SCLERAL BUCKLING     Chief Complaint(s) and History of Present Illness(es)       Post Op (Ophthalmology) Right Eye              Comments: POM1 (Right) RIGHT EYE VITRECTOMY, PARS PLANA APPROACH, USING 25-GAUGE INSTRUMENTS, GAS VS OIL, POSSIBLE SCLERAL BUCKLING              Comments    Patient states vision in RE has suddenly gotten better. No pain or irritation. No flashes of light, some floaters. He has noticed a \"magnifying\" effect in RE.     Ocular Meds: Prednisolone once daily RE    Rosa NI 2:02 PM May 14, 2025                      "

## 2025-05-14 NOTE — PROGRESS NOTES
"CC -  status post  RD repair right eye   Initially referred by Dr. Powers 4/2025  Patient was seen yesterday by Dr. Powers. Patient states vision in RE is getting worse, \"dark cloud coming down\" along the bottom right of his right eye. Lots of flashes of light and floaters.     [facility] - Manfred Young is a 54 year old patient with history of HST OD and RD OS with ERM OS  Interval: VA slightly better; no pain    PAST OCULAR SURGERY  Cryo OD  SBP/cryo OS  CE/IOL OU 9/2024 ([facility])    RETINAL IMAGING:  OCT 04/14/25   OD - macula normal, PVD  OS - mild ERM non-central, no sig foveal distortion, PVD    Optos consistent with exam  Autofluorescence 05/14/25 peripheral hypoautofluorescence of laser scars    ASSESSMENT & PLAN  # Postoperative day 1 status post 25 g Pars plana vitrectomy (PPV)/endolaser/air fluid exchange/ C3F8 14% right eye 04/16/25 at [facility]  For   # RRD OD macula on   - HST with SRF noted 4/14/25   - SRF and symptoms significant increase 4/15/25 too much SRF for pexy  # new cystoid macular edema   See below  Retina attached  Doing well    Plan:  Position: Not on back; Ok to sleep R or L side down; straight during the day  No aviation  No heavy lifting   Chairez shield at all times    Medications to operative eye  Predforte   (pink top) increase to four times a day  right eye   Start ketorolac (gray top) four times a day  right eye   Ofloxacin (tan top) stop       What to watch out for:  If you experience any of the following \"RSVP Symptoms\", you should call immediately:  Worsening Redness  Worsening Sensitivity to light  Worsening Vision, including new flashing lights or floaters  Worsening Pain, including nausea/vomiting      # ERM OS  - BCVA OS 20/20 on 10/2024  - ERM non-central, minimal foveal distortion    # H/o RD OS s/p SBP and cryotherapy- in [place]- Dr. Breezy Graves 1991  # h/o HST OD s/p cryotherapy- 1991  No history of prematurity  # history of PVD both eyes    PLAN: follow up in one month with  " Optical Coherence Tomography     ~~~~~~~~~~~~~~~~~~~~~~~~~~~~~~~~~~   Complete documentation of historical and exam elements from today's encounter can be found in the full encounter summary report (not reduplicated in this progress note).  I personally obtained the chief complaint(s) and history of present illness.  I confirmed and edited as necessary the review of systems, past medical/surgical history, family history, social history, and examination findings as documented by others; and I examined the patient myself.  I personally reviewed the relevant tests, images, and reports as documented above.  I formulated and edited as necessary the assessment and plan and discussed the findings and management plan with the patient and family    Debi Jiménez MD  Professor of Ophthalmology  Vitreo-Retinal surgeon   Department of Ophthalmology and Visual Neurosciences   [facility]  Phone: [phone]   Fax: [phone]

## 2025-05-27 ENCOUNTER — OFFICE VISIT (OUTPATIENT)
Dept: FAMILY MEDICINE | Facility: CLINIC | Age: 55
End: 2025-05-27
Payer: COMMERCIAL

## 2025-05-27 ENCOUNTER — NURSE TRIAGE (OUTPATIENT)
Dept: FAMILY MEDICINE | Facility: CLINIC | Age: 55
End: 2025-05-27

## 2025-05-27 VITALS
DIASTOLIC BLOOD PRESSURE: 68 MMHG | HEIGHT: 73 IN | OXYGEN SATURATION: 100 % | HEART RATE: 63 BPM | RESPIRATION RATE: 16 BRPM | SYSTOLIC BLOOD PRESSURE: 118 MMHG | BODY MASS INDEX: 27.72 KG/M2 | TEMPERATURE: 98.4 F | WEIGHT: 209.12 LBS

## 2025-05-27 DIAGNOSIS — S61.452A CAT BITE OF HAND, LEFT, INITIAL ENCOUNTER: Primary | ICD-10-CM
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
## 2025-05-27 PROCEDURE — 3078F DIAST BP <80 MM HG: CPT

## 2025-05-27 PROCEDURE — 99213 OFFICE O/P EST LOW 20 MIN: CPT

## 2025-05-27 PROCEDURE — 3074F SYST BP LT 130 MM HG: CPT

## 2025-05-27 RX ORDER — DOXYCYCLINE 100 MG/1
100 CAPSULE ORAL 2 TIMES DAILY
Qty: 14 CAPSULE | Refills: 0 | Status: SHIPPED | OUTPATIENT
Start: 2025-05-27 | End: 2025-06-03

## 2025-05-27 NOTE — TELEPHONE ENCOUNTER
Writer called patient:  Location of cat bite: left ring finger  Cat bite occurred on 5/24/25  Household cat and vaccines are overdue by 2 months  Unsure how overdue cat is for rabies vaccine-would need to call vet  Cat does go in patient's backyard  Last TDAP was in 2021  Bruised  Puncture wound present    Writer recommended office visit for evaluation and offered double-booked appt with ROXI Martinez CNP, today at 1320, which patient declined due to schedule conflict.    Writer offered appt at 1525 or 1610 with clinicians at [facility].  Patient agreed to 1525 appt with RADHA Nunn CNP at [facility].  Visit date, time and location confirmed with patient.    Encouraged patient to call back and ask to speak with a nurse for any new, changing or worsening symptoms. Registered Nurses are available via phone 24/7.    Patient verbalized understanding and in agreement with plan.    IRAM Lynch, RN-BC  [facility]    Reason for Disposition   Puncture wound (hole through the skin) from cat (teeth or claws)    Additional Information   Negative: Major bleeding (actively dripping or spurting) that can't be stopped   Negative: Sounds like a life-threatening emergency to the triager   Negative: Human bite   Negative: Any break in skin from BITE (e.g., cut, puncture, or scratch) and WILD animal at risk for RABIES (e.g., bat, raccoon, mckeon, skunk, coyote, other carnivores; see Background for list)   Negative: Any break in skin from BITE (e.g., cut, puncture, or scratch) and PET animal (e.g., dog, cat, or ferret) at risk for RABIES (e.g., sick, stray, unprovoked bite, developing country)   Negative: Any break in skin from BITE (e.g., cut, puncture, or scratch) and monkey   Negative: Cut (length > 1/8 inch or 3 mm) or skin tear and any animal  (Exception: Superficial scratch that doesn't go through dermis.)   Negative: Bleeding won't stop after 10 minutes of direct pressure (using correct  technique)   Negative: EXPOSURE of non-intact skin (e.g., exposed person has dermatitis, abrasion, wound) with animal BODY FLUID (e.g., licking, blood, brain, saliva) and animal at high-risk for RABIES (e.g., bat, raccoon, mckeon, skunk, coyote, other carnivores)   Negative: Sounds like a serious bite injury to the triager    Protocols used: Animal Bite-A-OH

## 2025-05-27 NOTE — TELEPHONE ENCOUNTER
.Clinic RN: Please investigate patient's chart or contact patient if the information cannot be found because this medication was prescribed for an acute condition. Confirm current symptoms/need for medication and possible need for appointment. If necessary, document reason and route refill encounter to provider for approval or denial.    Ant, Triage RN  [facility]/ [phone]

## 2025-05-27 NOTE — TELEPHONE ENCOUNTER
New Medication Request    Contacts       Contact Date/Time Type Contact Phone/Fax    05/27/2025 06:43 AM CDT Phone (Incoming) Manfred Young (Self) [phone] ([place])     med request            What medication are you requesting?: antibiotic    Reason for medication request: Cat bite puncture wound left hand ring finger    Have you taken this medication before?: Yes: long time     Controlled Substance Agreement on file:   CSA -- Patient Level:    CSA: None found at the patient level.         Patient offered an appointment? Yes: declined    Preferred Pharmacy:         [id number] - [place], [place] - [address]  [address] [place] [address]  Phone: [phone] Fax: [phone]      Could we send this information to you in [place] or would you prefer to receive a phone call?:   Patient would prefer a phone call   Okay to leave a detailed message?: Yes at Cell number on file:    Telephone Information:   Mobile [phone]

## 2025-05-27 NOTE — PROGRESS NOTES
Assessment & Plan    Cat bite of hand, left, initial encounter  - Cat bite not currently infected. Risk of infection due due to cat bite warranting prophylactic antibiotic treatment.   - Prescribe doxycycline twice a day, morning and night, for 7 days to prevent infection. Monitor for changes such as increased drainage, erythema, or warmth at site which may necessitate a change in antibiotic.    - doxycycline hyclate (VIBRAMYCIN) 100 MG capsule  Dispense: 14 capsule; Refill: 0      Please seek immediate medical attention (go to the emergency room or urgent care) if symptoms worser or for concerning changes.    Follow-up if symptoms do not improve as anticipated, as needed for acute concern, and May schedule follow-up with me for cat bite if unable to see PCP due to scheduling availability     ---------------------------------------------------------------------------------------   Subjective   Manfred is a 54 year old year old male, presenting for the following health issues:  Chief Complaint   Patient presents with    Trauma     Cat bite 05/24/2025 5/27/2025     3:21 PM   Additional Questions   Roomed by ELTON SOTO CMA   Accompanied by Javi ALVES  Manfred Young, a 54-year-old male, presented with concerns related to a recent cat bite 2 days ago. The bite was described as minor, but he expressed concern about potential infection due to a previous experience where his partner had a tendon infection from a similar incident that required surgical intervention. Manfred has a known penicillin allergy, with diarrhea listed as the side effect or allergic reaction. He mentioned that the current bite does not appear infected yet, but he is cautious due to the risk of infection associated with cat bites.  Cat is up-to-date with immunizations.  Cat is not inside and outside cannot although does belong to Manfred with no concerns for other pathological etiologies such as rabies.      Patient submitted visit  "information  Answers submitted by the patient for this visit:  Provider Visit on 5/27/2025  3:45 PM with Jonas Nunn  General Concern (Submitted on 5/27/2025)  Chief Complaint: Chronic problems general questions HPI Form  What is the reason for your visit today?: cat bite  When did your symptoms begin?: 1-3 days ago  ---------------------------------------------------------------------------------------   Objective    Vital signs: /68 (BP Location: Left arm, Patient Position: Sitting, Cuff Size: Adult Large)   Pulse 63   Temp 98.4  F (36.9  C) (Oral)   Resp 16   Ht 1.854 m (6' 1\")   Wt 94.9 kg (209 lb 1.9 oz)   SpO2 100%   BMI 27.59 kg/m      Body mass index is 27.59 kg/m .    Physical Exam    General appearance: alert, well appearing, and in no distress  Mental status: alert, oriented to person, place, and time  Heart: normal rate, regular rhythm, normal S1, S2, no murmurs, rubs, clicks or gallops  Lungs: clear to auscultation, no wheezes, rales or rhonchi, symmetric air entry     ---------------------------------------------------------------------------------------  Options for treatment and follow-up care were reviewed with the patient. Manfred Young and/or guardian was engaged and actively involved in the decision making process. Manfred Young and/or guardian verbalized understanding of the options discussed and was satisfied with the final plan.      Patient consented to use of ambient AI scribe prior to initiation of visit.    Signed Electronically by: GILDARDO Davila CNP      "

## 2025-06-03 DIAGNOSIS — H35.372 EPIRETINAL MEMBRANE (ERM) OF LEFT EYE: Primary | ICD-10-CM

## 2025-06-12 ENCOUNTER — OFFICE VISIT (OUTPATIENT)
Dept: OPHTHALMOLOGY | Facility: CLINIC | Age: 55
End: 2025-06-12
Attending: OPHTHALMOLOGY
Payer: COMMERCIAL

## 2025-06-12 DIAGNOSIS — H35.372 EPIRETINAL MEMBRANE (ERM) OF LEFT EYE: ICD-10-CM

## 2025-06-12 PROCEDURE — G0463 HOSPITAL OUTPT CLINIC VISIT: HCPCS | Performed by: OPHTHALMOLOGY

## 2025-06-12 PROCEDURE — 92134 CPTRZ OPH DX IMG PST SGM RTA: CPT | Performed by: OPHTHALMOLOGY

## 2025-06-12 ASSESSMENT — VISUAL ACUITY
OS_CC: 20/40
OS_PH_CC: 20/30
OD_CC: 20/20
OS_PH_CC+: -1
METHOD: SNELLEN - LINEAR

## 2025-06-12 ASSESSMENT — TONOMETRY
OS_IOP_MMHG: 13
OD_IOP_MMHG: 14
IOP_METHOD: TONOPEN

## 2025-06-12 ASSESSMENT — CUP TO DISC RATIO
OS_RATIO: 0.1
OD_RATIO: 0.1

## 2025-06-12 ASSESSMENT — SLIT LAMP EXAM - LIDS
COMMENTS: NORMAL
COMMENTS: NORMAL

## 2025-06-12 NOTE — PROGRESS NOTES
"CC -  status post  RD repair right eye   Initially referred by Dr. Powers 4/2025  Patient was seen yesterday by Dr. Powers. Patient states vision in RE is getting worse, \"dark cloud coming down\" along the bottom right of his right eye. Lots of flashes of light and floaters.     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  Interval: VA slightly better; no pain    PAST OCULAR SURGERY  Cryo OD  SBP/cryo OS  CE/IOL OU 9/2024 ([facility])    RETINAL IMAGING:  OCT 06/12/25   OD - macula normal avitric   OS - mild ERM non-central, no sig foveal distortion, PVD    Optos consistent with exam  Autofluorescence 05/14/25 peripheral hypoautofluorescence of laser scars    ASSESSMENT & PLAN  # status post 25 g Pars plana vitrectomy (PPV)/endolaser/air fluid exchange/ C3F8 14% right eye 04/16/25 at [facility]  For   # RRD OD macula on   - HST with SRF noted 4/14/25   - SRF and symptoms significant increase 4/15/25 too much SRF for pexy  # resolved cystoid macular edema   Retina attached  Doing well    Plan:  Position: Not on back; Ok to sleep R or L side down; straight during the day  No aviation  No heavy lifting   Chairez shield at all times    Medications to operative eye  Predforte   (pink top) once a day  right eye until finish  ketorolac (gray top) once a day  right eye until finish    What to watch out for:  If you experience any of the following \"RSVP Symptoms\", you should call immediately:  Worsening Redness  Worsening Sensitivity to light  Worsening Vision, including new flashing lights or floaters  Worsening Pain, including nausea/vomiting      # ERM OS  - BCVA OS 20/20 on 10/2024  - ERM non-central, minimal foveal distortion    # H/o RD OS s/p SBP and cryotherapy- in [place]- Dr. Breezy Graves 1991  # h/o HST OD s/p cryotherapy- 1991  No history of prematurity  # history of PVD both eyes    PLAN: follow up in 2 months with  Optical Coherence Tomography and optos " dilation    ~~~~~~~~~~~~~~~~~~~~~~~~~~~~~~~~~~   Complete documentation of historical and exam elements from today's encounter can be found in the full encounter summary report (not reduplicated in this progress note).  I personally obtained the chief complaint(s) and history of present illness.  I confirmed and edited as necessary the review of systems, past medical/surgical history, family history, social history, and examination findings as documented by others; and I examined the patient myself.  I personally reviewed the relevant tests, images, and reports as documented above.  I formulated and edited as necessary the assessment and plan and discussed the findings and management plan with the patient and family    Debi Jiménez MD  Professor of Ophthalmology  Vitreo-Retinal surgeon   Department of Ophthalmology and Visual Neurosciences   [facility]  Phone: [phone]   Fax: [phone]

## 2025-06-12 NOTE — NURSING NOTE
Chief Complaints and History of Present Illnesses   Patient presents with    Post Op (Ophthalmology) Right Eye      post 25 g Pars plana vitrectomy (PPV)/endolaser/air fluid exchange/ C3F8 14% right eye 04/16/25      Chief Complaint(s) and History of Present Illness(es)       Post Op (Ophthalmology) Right Eye              Comments:  post 25 g Pars plana vitrectomy (PPV)/endolaser/air fluid exchange/ C3F8 14% right eye 04/16/25               Comments    No new problems or concerns   No eye pain     Sherron Bocanegra COT 2:29 PM June 12, 2025

## 2025-06-24 ENCOUNTER — TRANSFERRED RECORDS (OUTPATIENT)
Dept: HEALTH INFORMATION MANAGEMENT | Facility: CLINIC | Age: 55
End: 2025-06-24
Payer: COMMERCIAL

## 2025-07-25 ENCOUNTER — TRANSFERRED RECORDS (OUTPATIENT)
Dept: HEALTH INFORMATION MANAGEMENT | Facility: CLINIC | Age: 55
End: 2025-07-25
Payer: COMMERCIAL

## 2025-07-28 DIAGNOSIS — H35.372 EPIRETINAL MEMBRANE (ERM) OF LEFT EYE: Primary | ICD-10-CM

## 2025-08-06 ENCOUNTER — OFFICE VISIT (OUTPATIENT)
Dept: OPHTHALMOLOGY | Facility: CLINIC | Age: 55
End: 2025-08-06
Attending: OPHTHALMOLOGY
Payer: COMMERCIAL

## 2025-08-06 DIAGNOSIS — H35.372 EPIRETINAL MEMBRANE (ERM) OF LEFT EYE: ICD-10-CM

## 2025-08-06 PROCEDURE — 92134 CPTRZ OPH DX IMG PST SGM RTA: CPT | Performed by: OPHTHALMOLOGY

## 2025-08-06 PROCEDURE — 92250 FUNDUS PHOTOGRAPHY W/I&R: CPT | Performed by: OPHTHALMOLOGY

## 2025-08-06 PROCEDURE — 99024 POSTOP FOLLOW-UP VISIT: CPT | Mod: GC | Performed by: OPHTHALMOLOGY

## 2025-08-06 PROCEDURE — 99207 FUNDUS PHOTOS OU (BOTH EYES): CPT | Mod: 26 | Performed by: OPHTHALMOLOGY

## 2025-08-06 PROCEDURE — G0463 HOSPITAL OUTPT CLINIC VISIT: HCPCS | Performed by: OPHTHALMOLOGY

## 2025-08-06 ASSESSMENT — EXTERNAL EXAM - RIGHT EYE: OD_EXAM: NORMAL

## 2025-08-06 ASSESSMENT — CUP TO DISC RATIO
OS_RATIO: 0.1
OD_RATIO: 0.1

## 2025-08-06 ASSESSMENT — VISUAL ACUITY
OS_SC: 20/30 ECC
METHOD: SNELLEN - LINEAR
OS_SC+: -1
OD_SC: 20/20

## 2025-08-06 ASSESSMENT — EXTERNAL EXAM - LEFT EYE: OS_EXAM: NORMAL

## 2025-08-06 ASSESSMENT — TONOMETRY
IOP_METHOD: ICARE
OS_IOP_MMHG: 12
OD_IOP_MMHG: 12

## 2025-08-06 ASSESSMENT — SLIT LAMP EXAM - LIDS
COMMENTS: NORMAL
COMMENTS: NORMAL

## (undated) DEVICE — ENDO DISSECTOR BLUNT 05MM  BTD05

## (undated) DEVICE — SU PDS II 0 CT-1 27" Z340H

## (undated) DEVICE — GLOVE BIOGEL PI MICRO INDICATOR UNDERGLOVE SZ 7.5 48975

## (undated) DEVICE — EYE CANN IRR 25GA HYDRODISSECTING 585037

## (undated) DEVICE — DRAPE EYE SHEET 8441

## (undated) DEVICE — SOL WATER IRRIG 500ML BOTTLE 2F7113

## (undated) DEVICE — SOL WATER IRRIG 1000ML BOTTLE 2F7114

## (undated) DEVICE — TAPE MICROPORE 1"X1.5YD 1530S-1

## (undated) DEVICE — EYE CANN SOFT TIP 25GA FOR VALVED SET 8065149530

## (undated) DEVICE — LINEN TOWEL PACK X5 5464

## (undated) DEVICE — LINEN TOWEL PACK X6 WHITE 5487

## (undated) DEVICE — CATARACT BLADE PACK 2.6MM 58001899

## (undated) DEVICE — PACK CATARACT CUSTOM ASC SEY15CPUMC

## (undated) DEVICE — EYE SHIELD PLASTIC

## (undated) DEVICE — ENDO TROCAR FIRST ENTRY KII FIOS Z-THRD 05X100MM CTF03

## (undated) DEVICE — DRSG TEGADERM 4X4 3/4" 1626W

## (undated) DEVICE — ENDO POUCH UNIV RETRIEVAL SYSTEM INZII 10MM CD001

## (undated) DEVICE — EYE TIP IRRIGATION & ASPIRATION POLYMER 35D BENT 8065751511

## (undated) DEVICE — CLIP ENDO HEMO-LOC GREEN MED/LG 544230

## (undated) DEVICE — ANTIFOG SOLUTION W/FOAM PAD 31142527

## (undated) DEVICE — PACK VITRECTOMY/RET CUSTOM ASC PQ15VRU12

## (undated) DEVICE — EYE PACK 25GA CONSTELLATION 10,000 CPM PPK9380-04

## (undated) DEVICE — Device

## (undated) DEVICE — DRAPE SHEET MED 44X70" 9355

## (undated) DEVICE — ENDO TROCAR FIRST ENTRY KII FIOS Z-THRD 12X100MM CTF73

## (undated) DEVICE — EYE PROBE ESU DIATHERMY DSP 25GA 339.21

## (undated) DEVICE — SU MONOCRYL 4-0 PS-2 18" UND Y496G

## (undated) DEVICE — TAPE DURAPORE 2"X1.5YD SILK 1538S-2

## (undated) DEVICE — SU VICRYL 0 UR-6 27" J603H

## (undated) DEVICE — GLOVE BIOGEL PI ULTRATOUCH SZ 7.5 41175

## (undated) DEVICE — EYE PACK CUSTOM ANTERIOR 30DEG TIP CENTURION PPK6682-04

## (undated) DEVICE — DEVICE SUTURE PASSER 14GA WECK EFX EFXSP2

## (undated) DEVICE — SU DERMABOND ADVANCED .7ML DNX12

## (undated) DEVICE — ENDO TROCAR SLEEVE KII Z-THREADED 12X100MM CTS22

## (undated) DEVICE — GLOVE BIOGEL PI MICRO SZ 7.5 48575

## (undated) DEVICE — LINEN TOWEL PACK X30 5481

## (undated) DEVICE — SU PLAIN 6-0 TG140-8 18" 1735G

## (undated) DEVICE — ENDO TROCAR SLEEVE KII Z-THREADED 05X100MM CTS02

## (undated) DEVICE — ESU ENDO SCISSORS 5MM CVD 5DCS

## (undated) DEVICE — SUCTION MANIFOLD NEPTUNE 2 SYS 4 PORT 0702-020-000

## (undated) DEVICE — EYE SOL BSS 500ML BAG 0065-1795-04

## (undated) DEVICE — SYR 01ML LL W/O NDL LATEX FREE 309628

## (undated) DEVICE — TUBING SMOKE EVAC PNEUMOCLEAR HIGH FLOW 0620050250

## (undated) DEVICE — APPLICATORS COTTON TIP 6"X2 STERILE LF C15053-006

## (undated) DEVICE — NDL INSUFFLATION 13GA 120MM C2201

## (undated) DEVICE — BLADE CLIPPER SGL USE 9680

## (undated) DEVICE — ESU GROUND PAD ADULT W/CORD E7507

## (undated) DEVICE — GLOVE BIOGEL PI MICRO SZ 6.0 48560

## (undated) DEVICE — NDL 18GA 1.5" 305196

## (undated) DEVICE — STRAP KNEE/BODY 31143004

## (undated) RX ORDER — LIDOCAINE HYDROCHLORIDE 20 MG/ML
JELLY TOPICAL
Status: DISPENSED
Start: 2025-02-11

## (undated) RX ORDER — INDOCYANINE GREEN AND WATER 25 MG
KIT INJECTION
Status: DISPENSED
Start: 2023-08-07

## (undated) RX ORDER — ACETAMINOPHEN 325 MG/1
TABLET ORAL
Status: DISPENSED
Start: 2023-08-07

## (undated) RX ORDER — OXYCODONE HYDROCHLORIDE 5 MG/1
TABLET ORAL
Status: DISPENSED
Start: 2023-08-07

## (undated) RX ORDER — FENTANYL CITRATE 50 UG/ML
INJECTION, SOLUTION INTRAMUSCULAR; INTRAVENOUS
Status: DISPENSED
Start: 2023-08-07

## (undated) RX ORDER — BALANCED SALT SOLUTION 6.4; .75; .48; .3; 3.9; 1.7 MG/ML; MG/ML; MG/ML; MG/ML; MG/ML; MG/ML
SOLUTION OPHTHALMIC
Status: DISPENSED
Start: 2025-04-16

## (undated) RX ORDER — HYDROMORPHONE HYDROCHLORIDE 1 MG/ML
INJECTION, SOLUTION INTRAMUSCULAR; INTRAVENOUS; SUBCUTANEOUS
Status: DISPENSED
Start: 2023-08-07

## (undated) RX ORDER — ACETAMINOPHEN 325 MG/1
TABLET ORAL
Status: DISPENSED
Start: 2024-09-05

## (undated) RX ORDER — HEPARIN SODIUM 5000 [USP'U]/.5ML
INJECTION, SOLUTION INTRAVENOUS; SUBCUTANEOUS
Status: DISPENSED
Start: 2023-08-07

## (undated) RX ORDER — PROPARACAINE HYDROCHLORIDE 5 MG/ML
SOLUTION/ DROPS OPHTHALMIC
Status: DISPENSED
Start: 2025-04-16

## (undated) RX ORDER — CYCLOPENTOLAT/TROPIC/PHENYLEPH 1%-1%-2.5%
DROPS (EA) OPHTHALMIC (EYE)
Status: DISPENSED
Start: 2025-04-16

## (undated) RX ORDER — FENTANYL CITRATE 50 UG/ML
INJECTION, SOLUTION INTRAMUSCULAR; INTRAVENOUS
Status: DISPENSED
Start: 2024-09-05

## (undated) RX ORDER — FENTANYL CITRATE 50 UG/ML
INJECTION, SOLUTION INTRAMUSCULAR; INTRAVENOUS
Status: DISPENSED
Start: 2024-08-29

## (undated) RX ORDER — ACETAMINOPHEN 325 MG/1
TABLET ORAL
Status: DISPENSED
Start: 2024-08-29

## (undated) RX ORDER — LIDOCAINE HYDROCHLORIDE 10 MG/ML
INJECTION, SOLUTION EPIDURAL; INFILTRATION; INTRACAUDAL; PERINEURAL
Status: DISPENSED
Start: 2023-08-07